# Patient Record
Sex: MALE | Race: WHITE | NOT HISPANIC OR LATINO | Employment: OTHER | URBAN - METROPOLITAN AREA
[De-identification: names, ages, dates, MRNs, and addresses within clinical notes are randomized per-mention and may not be internally consistent; named-entity substitution may affect disease eponyms.]

---

## 2017-05-12 ENCOUNTER — HOSPITAL ENCOUNTER (EMERGENCY)
Facility: HOSPITAL | Age: 37
Discharge: HOME | End: 2017-05-12
Admitting: EMERGENCY MEDICINE
Payer: COMMERCIAL

## 2017-05-12 ENCOUNTER — APPOINTMENT (EMERGENCY)
Dept: RADIOLOGY | Facility: HOSPITAL | Age: 37
End: 2017-05-12
Payer: COMMERCIAL

## 2017-05-12 VITALS
HEART RATE: 74 BPM | OXYGEN SATURATION: 95 % | RESPIRATION RATE: 18 BRPM | TEMPERATURE: 97.9 F | SYSTOLIC BLOOD PRESSURE: 127 MMHG | DIASTOLIC BLOOD PRESSURE: 64 MMHG

## 2017-05-12 DIAGNOSIS — J20.9 ACUTE BRONCHITIS: Primary | ICD-10-CM

## 2017-05-12 PROCEDURE — 71020 HB CHEST X-RAY 2VW FRONTAL&LATL: CPT

## 2017-05-12 PROCEDURE — 94640 AIRWAY INHALATION TREATMENT: CPT

## 2017-05-12 PROCEDURE — 99283 EMERGENCY DEPT VISIT LOW MDM: CPT

## 2017-05-12 RX ORDER — ALBUTEROL SULFATE 2.5 MG/3ML
5 SOLUTION RESPIRATORY (INHALATION) ONCE
Status: COMPLETED | OUTPATIENT
Start: 2017-05-12 | End: 2017-05-12

## 2017-05-12 RX ORDER — AZITHROMYCIN 250 MG/1
TABLET, FILM COATED ORAL
Qty: 6 TABLET | Refills: 0 | Status: SHIPPED | OUTPATIENT
Start: 2017-05-12 | End: 2021-05-21 | Stop reason: ALTCHOICE

## 2017-05-12 RX ORDER — BENZONATATE 100 MG/1
100 CAPSULE ORAL 3 TIMES DAILY PRN
Qty: 30 CAPSULE | Refills: 0 | Status: SHIPPED | OUTPATIENT
Start: 2017-05-12 | End: 2021-05-21 | Stop reason: ALTCHOICE

## 2017-05-12 RX ORDER — PREDNISONE 10 MG/1
TABLET ORAL
Qty: 20 TABLET | Refills: 0 | Status: SHIPPED | OUTPATIENT
Start: 2017-05-12 | End: 2021-05-21 | Stop reason: ALTCHOICE

## 2017-05-12 RX ORDER — ALBUTEROL SULFATE 90 UG/1
2 AEROSOL, METERED RESPIRATORY (INHALATION) EVERY 6 HOURS PRN
Qty: 8 G | Refills: 0 | Status: SHIPPED | OUTPATIENT
Start: 2017-05-12 | End: 2021-05-21 | Stop reason: ALTCHOICE

## 2017-05-12 RX ADMIN — ALBUTEROL SULFATE 5 MG: 2.5 SOLUTION RESPIRATORY (INHALATION) at 12:29

## 2017-05-12 RX ADMIN — IPRATROPIUM BROMIDE 0.5 MG: 0.5 SOLUTION RESPIRATORY (INHALATION) at 12:29

## 2018-01-13 NOTE — MISCELLANEOUS
Provider Comments  Provider Comments:   Dear Itzel Points,    You missed your follow up appointment with Prasanna Jessica on 12/05/2016; please contact our office to reschedule at [phone]  We understand that many situations arise that occasionally prevent patients from keeping scheduled appointments  It is the policy of [address] [place] [address] [facility] that patients notify us 24 hours in advance if unable to keep a scheduled appointment  Missed appointments jeopardize strong physician-patient relationships  The appointment you missed could have easily been made available to another patient if you had contacted us to cancel  We like to accommodate all of our patients, but when patients miss an appointment it prevents us from being able to help everyone  In the future, we request at least 24 hours' notice of cancellation so we can make your appointment available to someone else in need       Sincerely,  The Physicians and Staff at [facility]          Signatures   Electronically signed by : Althea Mcneill, ; Dec  5 2016  4:32PM EST                       (Author)

## 2018-01-16 NOTE — PROGRESS NOTES
Assessment    1  H  pylori infection (041 86) (B96 81)   2  Ulcer of the duodenum caused by bacteria (H  pylori) (532 90,041 86) (K26 9,B96 81)   3  Gastric ulcer due to Helicobacter pylori, unspecified ulcer chronicity (531 90,041 86)   (K25 9)    Discussion/Summary    1  Results of the EGD were reviewed in full with the patient and the patient and his girlfriend were advised of the cause and course of these ulcerations  The consequences of bulemia were reviewed in full with the patient  The images from the EGD were reviewed and given to the patient, he was advised to keep these and take them to his gastroenterologist   2  The patient was advised to continue taking his pepcid as directed  3  A referral was made to gastroenterology for maintenance of his chronic gastric issues and H  Pylori infection  He has fairly severe ulcer disease and I think gastroenterology should continue to follow him to ensure these have healed  The referral has been made and he will see them in the next 6 weeks  The patient was counseled regarding diagnostic results, instructions for management, prognosis, risks and benefits of treatment options  Possible side effects of new medications were reviewed with the patient/guardian today  Chief Complaint  Patient is here today for a follow up visit post EGD done on 1/22/16  The pathology returned indication H  Pylori infection  He is here to discuss the findings of the EGD and for H  Pylori treatment  His girlfriend is here to interpret  fever/chills -denies   nausea/vomiting  abd pain - Episodes of pain seem to resolve after eating small amounts of food  bowels - regular pattern, no blood or melena  patient education - H  Pylori handout given in [place]      Post-Op  HPI: Pt presents today to review results from his EGD done on 1/22/16 which showed severe ulcerations, + for H Pylori infection, but with no cancerous changes   He was prescribed a Prev pack, sucralate, and pepsid  His vomiting has improved and symptoms have decreased  He has finished his Prev pack and sucralate and it continuing to take his pepsid as directed  Pt denies N/V/F/C/CP/SOB  AN      Review of Systems    Constitutional: No fever or chills, feels well, no tiredness, no recent weight gain or weight loss  Eyes: No complaints of eye pain, no red eyes, no discharge from eyes, no itchy eyes  ENT: no complaints of earache, no hearing loss, no nosebleeds, no nasal discharge, no sore throat, no hoarseness  Cardiovascular: No complaints of slow heart rate, no fast heart rate, no chest pain, no palpitations, no leg claudication, no lower extremity  Respiratory: No complaints of shortness of breath, no wheezing, no cough, no SOB on exertion, no orthopnea or PND  Gastrointestinal: No complaints of abdominal pain, no constipation, no nausea or vomiting, no diarrhea or bloody stools  Musculoskeletal: No complaints of arthralgia, no myalgias, no joint swelling or stiffness, no limb pain or swelling  Integumentary: No complaints of skin rash or skin lesions, no itching, no skin wound, no dry skin  Neurological: No compliants of headache, no confusion, no convulsions, no numbness or tingling, no dizziness or fainting, no limb weakness, no difficulty walking  Active Problems    1  Bulimia nervosa, moderate (307 51) (F50 2)   2  Esophageal dysphagia (787 24) (R13 14)   3  Need for influenza vaccination (V04 81) (Z23)   4  Screening for depression (V79 0) (Z13 89)    Past Medical History  Active Problems And Past Medical History Reviewed: The active problems and past medical history were reviewed and updated today  Surgical History  Surgical History Reviewed: The surgical history was reviewed and updated today  Social History    · Denied: History of Drug use   · Never a smoker   · Recent decrease in alcohol use  The social history was reviewed and updated today   The social history was reviewed and is unchanged  Family History  Family History Reviewed: The family history was reviewed and updated today  Current Meds   1  No Reported Medications Recorded    Allergies    1  No Known Drug Allergies    Vitals   Recorded: [id number] 11:45AM   Temperature 98 3 F, Tympanic   Heart Rate 64, L Radial   Pulse Quality Regular, L Radial   Respiration 16   Systolic 030, LUE, Sitting   Diastolic 76, LUE, Sitting   Height 5 ft 6 in   Weight 170 lb 6 4 oz   BMI Calculated 27 5   BSA Calculated 1 87     Physical Exam    Constitutional   General appearance: No acute distress, well appearing and well nourished  Eyes   Conjunctiva and lids: No swelling, erythema, or discharge  Pupils and irises: Equal, round and reactive to light  Sclera non-icteric  Ears, Nose, Mouth, and Throat   External inspection of ears and nose: Normal     Neck   Supple, symmetric, trachea midline, no masses   Pulmonary   Respiratory effort: No increased work of breathing or signs of respiratory distress  Auscultation of lungs: Clear to auscultation, equal breath sounds bilaterally, no wheezes, no rales, no rhonci  Cardiovascular   Auscultation of heart: Normal rate and rhythm, normal S1 and S2, without murmurs  Examination of extremities for edema and/or varicosities: Normal     Carotid pulses: Normal     Abdomen   Abdomen: Non-tender, no masses  Liver and spleen: No hepatomegaly or splenomegaly  Lymphatic   Palpation of lymph nodes in neck: No lymphadenopathy  Musculoskeletal   Digits and nails: Normal without clubbing or cyanosis  Extremities: No clubbing, no cyanosis, no edema   Skin   Skin and subcutaneous tissue: Normal without rashes or lesions  Neurologic   Sensation: Motor and sensory grossly intact  Psychiatric   Orientation to person, place and time: Normal     Mood and affect: Normal        Results/Data  I personally reviewed the films/images/results in the office today   My interpretation follows  Diagnostic Review Review of the EGD and the path results, + for ulcerations and h pylori infection, but no evidence of cancerous change per pathology  Attending Note  Attending Note [address] [place] [address] [address]: Attending Note: I interviewed, took the history and examined the patient, I discussed the case with the Resident and reviewed the Resident's note, I supervised the Resident and I agree with the Resident management plan as it was presented to me  Level of Participation: I was present in clinic and examined the patient        Future Appointments    Date/Time Provider Specialty Site   03/10/2016 01:30 PM Rafael Jensen MD Gastroenterology Adult [address] ENDOSCOPY     Signatures   Electronically signed by : Gabriel Haynes MD; Feb 5 2016 12:39PM EST                       (Author)

## 2021-04-24 ENCOUNTER — HOSPITAL ENCOUNTER (INPATIENT)
Facility: HOSPITAL | Age: 41
LOS: 7 days | Discharge: HOME | DRG: 956 | End: 2021-05-01
Attending: SURGERY | Admitting: SURGERY
Payer: COMMERCIAL

## 2021-04-24 ENCOUNTER — APPOINTMENT (INPATIENT)
Dept: RADIOLOGY | Facility: HOSPITAL | Age: 41
DRG: 956 | End: 2021-04-24
Payer: COMMERCIAL

## 2021-04-24 ENCOUNTER — APPOINTMENT (EMERGENCY)
Dept: RADIOLOGY | Facility: HOSPITAL | Age: 41
DRG: 956 | End: 2021-04-24
Payer: COMMERCIAL

## 2021-04-24 DIAGNOSIS — S81.002A OPEN KNEE WOUND, LEFT, INITIAL ENCOUNTER: ICD-10-CM

## 2021-04-24 DIAGNOSIS — S52.022A CLOSED FRACTURE OF OLECRANON PROCESS OF LEFT ULNA, INITIAL ENCOUNTER: ICD-10-CM

## 2021-04-24 DIAGNOSIS — J93.9 PNEUMOTHORAX, LEFT: ICD-10-CM

## 2021-04-24 DIAGNOSIS — S72.301A CLOSED FRACTURE OF SHAFT OF RIGHT FEMUR, UNSPECIFIED FRACTURE MORPHOLOGY, INITIAL ENCOUNTER (HCC): Primary | ICD-10-CM

## 2021-04-24 PROBLEM — S72.92XA FEMUR FRACTURE, LEFT (HCC): Status: ACTIVE | Noted: 2021-04-24

## 2021-04-24 PROBLEM — S72.361A CLOSED DISPLACED SEGMENTAL FRACTURE OF SHAFT OF RIGHT FEMUR (HCC): Status: ACTIVE | Noted: 2021-04-24

## 2021-04-24 PROBLEM — S22.42XA CLOSED FRACTURE OF MULTIPLE RIBS OF LEFT SIDE: Status: ACTIVE | Noted: 2021-04-24
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
## 2021-04-24 LAB
ABO GROUP BLD: NORMAL
ABO GROUP BLD: NORMAL
ALBUMIN SERPL BCP-MCNC: 3.4 G/DL (ref 3.5–5)
ALP SERPL-CCNC: 81 U/L (ref 46–116)
ALT SERPL W P-5'-P-CCNC: 40 U/L (ref 12–78)
ANION GAP SERPL CALCULATED.3IONS-SCNC: 2 MMOL/L (ref 4–13)
APTT PPP: 23 SECONDS (ref 23–37)
AST SERPL W P-5'-P-CCNC: 24 U/L (ref 5–45)
BASE EXCESS BLDA CALC-SCNC: -3 MMOL/L (ref -2–3)
BASOPHILS # BLD AUTO: 0.06 THOUSANDS/ΜL (ref 0–0.1)
BASOPHILS NFR BLD AUTO: 1 % (ref 0–1)
BILIRUB SERPL-MCNC: 0.37 MG/DL (ref 0.2–1)
BLD GP AB SCN SERPL QL: NEGATIVE
BUN SERPL-MCNC: 19 MG/DL (ref 5–25)
CALCIUM ALBUM COR SERPL-MCNC: 8.9 MG/DL (ref 8.3–10.1)
CALCIUM SERPL-MCNC: 8.4 MG/DL (ref 8.3–10.1)
CHLORIDE SERPL-SCNC: 114 MMOL/L (ref 100–108)
CK MB SERPL-MCNC: 1.1 % (ref 0–2.5)
CK MB SERPL-MCNC: 2 NG/ML (ref 0–5)
CK SERPL-CCNC: 176 U/L (ref 39–308)
CO2 SERPL-SCNC: 25 MMOL/L (ref 21–32)
CREAT SERPL-MCNC: 1.27 MG/DL (ref 0.6–1.3)
EOSINOPHIL # BLD AUTO: 0.28 THOUSAND/ΜL (ref 0–0.61)
EOSINOPHIL NFR BLD AUTO: 2 % (ref 0–6)
ERYTHROCYTE [DISTWIDTH] IN BLOOD BY AUTOMATED COUNT: 12.6 % (ref 11.6–15.1)
ETHANOL SERPL-MCNC: <3 MG/DL (ref 0–3)
GFR SERPL CREATININE-BSD FRML MDRD: 32 ML/MIN/1.73SQ M
GLUCOSE SERPL-MCNC: 129 MG/DL (ref 65–140)
GLUCOSE SERPL-MCNC: 133 MG/DL (ref 65–140)
HCO3 BLDA-SCNC: 22.5 MMOL/L (ref 24–30)
HCT VFR BLD AUTO: 42.3 % (ref 36.5–49.3)
HCT VFR BLD AUTO: 43.8 % (ref 36.5–49.3)
HCT VFR BLD CALC: 42 % (ref 36.5–49.3)
HGB BLD-MCNC: 14.2 G/DL (ref 12–17)
HGB BLD-MCNC: 14.5 G/DL (ref 12–17)
HGB BLDA-MCNC: 14.3 G/DL (ref 12–17)
IMM GRANULOCYTES # BLD AUTO: 0.16 THOUSAND/UL (ref 0–0.2)
IMM GRANULOCYTES NFR BLD AUTO: 1 % (ref 0–2)
INR PPP: 0.92 (ref 0.84–1.19)
LACTATE SERPL-SCNC: 1.9 MMOL/L (ref 0.5–2)
LYMPHOCYTES # BLD AUTO: 5.19 THOUSANDS/ΜL (ref 0.6–4.47)
LYMPHOCYTES NFR BLD AUTO: 45 % (ref 14–44)
MCH RBC QN AUTO: 30.5 PG (ref 26.8–34.3)
MCHC RBC AUTO-ENTMCNC: 33.1 G/DL (ref 31.4–37.4)
MCV RBC AUTO: 92 FL (ref 82–98)
MONOCYTES # BLD AUTO: 0.65 THOUSAND/ΜL (ref 0.17–1.22)
MONOCYTES NFR BLD AUTO: 6 % (ref 4–12)
NEUTROPHILS # BLD AUTO: 5.1 THOUSANDS/ΜL (ref 1.85–7.62)
NEUTS SEG NFR BLD AUTO: 45 % (ref 43–75)
NRBC BLD AUTO-RTO: 0 /100 WBCS
PCO2 BLD: 24 MMOL/L (ref 21–32)
PCO2 BLD: 39.1 MM HG (ref 42–50)
PH BLD: 7.37 [PH] (ref 7.3–7.4)
PLATELET # BLD AUTO: 346 THOUSANDS/UL (ref 149–390)
PMV BLD AUTO: 8.9 FL (ref 8.9–12.7)
PO2 BLD: 65 MM HG (ref 35–45)
POTASSIUM BLD-SCNC: 4.8 MMOL/L (ref 3.5–5.3)
POTASSIUM SERPL-SCNC: 4.5 MMOL/L (ref 3.5–5.3)
PROT SERPL-MCNC: 7.1 G/DL (ref 6.4–8.2)
PROTHROMBIN TIME: 12.3 SECONDS (ref 11.6–14.5)
RBC # BLD AUTO: 4.76 MILLION/UL (ref 3.88–5.62)
RH BLD: POSITIVE
RH BLD: POSITIVE
SAO2 % BLD FROM PO2: 92 % (ref 60–85)
SODIUM BLD-SCNC: 141 MMOL/L (ref 136–145)
SODIUM SERPL-SCNC: 141 MMOL/L (ref 136–145)
SPECIMEN EXPIRATION DATE: NORMAL
SPECIMEN SOURCE: ABNORMAL
TROPONIN I SERPL-MCNC: 0.15 NG/ML
TROPONIN I SERPL-MCNC: 0.48 NG/ML
WBC # BLD AUTO: 11.44 THOUSAND/UL (ref 4.31–10.16)

## 2021-04-24 PROCEDURE — 70450 CT HEAD/BRAIN W/O DYE: CPT

## 2021-04-24 PROCEDURE — NC001 PR NO CHARGE: Performed by: SURGERY

## 2021-04-24 PROCEDURE — 84484 ASSAY OF TROPONIN QUANT: CPT | Performed by: ORTHOPAEDIC SURGERY

## 2021-04-24 PROCEDURE — 93308 TTE F-UP OR LMTD: CPT | Performed by: SURGERY

## 2021-04-24 PROCEDURE — 84295 ASSAY OF SERUM SODIUM: CPT

## 2021-04-24 PROCEDURE — 96374 THER/PROPH/DIAG INJ IV PUSH: CPT

## 2021-04-24 PROCEDURE — 0JQP0ZZ REPAIR LEFT LOWER LEG SUBCUTANEOUS TISSUE AND FASCIA, OPEN APPROACH: ICD-10-PCS | Performed by: ORTHOPAEDIC SURGERY

## 2021-04-24 PROCEDURE — G0390 TRAUMA RESPONS W/HOSP CRITI: HCPCS

## 2021-04-24 PROCEDURE — 73221 MRI JOINT UPR EXTREM W/O DYE: CPT

## 2021-04-24 PROCEDURE — 99292 CRITICAL CARE ADDL 30 MIN: CPT | Performed by: SURGERY

## 2021-04-24 PROCEDURE — 86920 COMPATIBILITY TEST SPIN: CPT

## 2021-04-24 PROCEDURE — 90715 TDAP VACCINE 7 YRS/> IM: CPT | Performed by: SURGERY

## 2021-04-24 PROCEDURE — 73552 X-RAY EXAM OF FEMUR 2/>: CPT

## 2021-04-24 PROCEDURE — 99291 CRITICAL CARE FIRST HOUR: CPT | Performed by: SURGERY

## 2021-04-24 PROCEDURE — 0S9D0ZZ DRAINAGE OF LEFT KNEE JOINT, OPEN APPROACH: ICD-10-PCS | Performed by: ORTHOPAEDIC SURGERY

## 2021-04-24 PROCEDURE — 82553 CREATINE MB FRACTION: CPT | Performed by: SURGERY

## 2021-04-24 PROCEDURE — 82550 ASSAY OF CK (CPK): CPT | Performed by: SURGERY

## 2021-04-24 PROCEDURE — G1004 CDSM NDSC: HCPCS

## 2021-04-24 PROCEDURE — NC001 PR NO CHARGE: Performed by: ORTHOPAEDIC SURGERY

## 2021-04-24 PROCEDURE — 73700 CT LOWER EXTREMITY W/O DYE: CPT

## 2021-04-24 PROCEDURE — 82947 ASSAY GLUCOSE BLOOD QUANT: CPT

## 2021-04-24 PROCEDURE — 72125 CT NECK SPINE W/O DYE: CPT

## 2021-04-24 PROCEDURE — 80053 COMPREHEN METABOLIC PANEL: CPT | Performed by: SURGERY

## 2021-04-24 PROCEDURE — NC001 PR NO CHARGE: Performed by: EMERGENCY MEDICINE

## 2021-04-24 PROCEDURE — P9016 RBC LEUKOCYTES REDUCED: HCPCS

## 2021-04-24 PROCEDURE — 76705 ECHO EXAM OF ABDOMEN: CPT | Performed by: SURGERY

## 2021-04-24 PROCEDURE — 73080 X-RAY EXAM OF ELBOW: CPT

## 2021-04-24 PROCEDURE — 85025 COMPLETE CBC W/AUTO DIFF WBC: CPT | Performed by: SURGERY

## 2021-04-24 PROCEDURE — 99291 CRITICAL CARE FIRST HOUR: CPT

## 2021-04-24 PROCEDURE — 96375 TX/PRO/DX INJ NEW DRUG ADDON: CPT

## 2021-04-24 PROCEDURE — 86900 BLOOD TYPING SEROLOGIC ABO: CPT | Performed by: SURGERY

## 2021-04-24 PROCEDURE — 96376 TX/PRO/DX INJ SAME DRUG ADON: CPT

## 2021-04-24 PROCEDURE — 36415 COLL VENOUS BLD VENIPUNCTURE: CPT | Performed by: SURGERY

## 2021-04-24 PROCEDURE — 84132 ASSAY OF SERUM POTASSIUM: CPT

## 2021-04-24 PROCEDURE — 83605 ASSAY OF LACTIC ACID: CPT | Performed by: SURGERY

## 2021-04-24 PROCEDURE — 74174 CTA ABD&PLVS W/CONTRAST: CPT

## 2021-04-24 PROCEDURE — 85014 HEMATOCRIT: CPT | Performed by: ORTHOPAEDIC SURGERY

## 2021-04-24 PROCEDURE — 86901 BLOOD TYPING SEROLOGIC RH(D): CPT | Performed by: SURGERY

## 2021-04-24 PROCEDURE — 0LQ60ZZ REPAIR LEFT LOWER ARM AND WRIST TENDON, OPEN APPROACH: ICD-10-PCS | Performed by: ORTHOPAEDIC SURGERY

## 2021-04-24 PROCEDURE — 32551 INSERTION OF CHEST TUBE: CPT | Performed by: SURGERY

## 2021-04-24 PROCEDURE — 71045 X-RAY EXAM CHEST 1 VIEW: CPT

## 2021-04-24 PROCEDURE — 85730 THROMBOPLASTIN TIME PARTIAL: CPT | Performed by: SURGERY

## 2021-04-24 PROCEDURE — 82803 BLOOD GASES ANY COMBINATION: CPT

## 2021-04-24 PROCEDURE — 82077 ASSAY SPEC XCP UR&BREATH IA: CPT | Performed by: SURGERY

## 2021-04-24 PROCEDURE — 84484 ASSAY OF TROPONIN QUANT: CPT | Performed by: SURGERY

## 2021-04-24 PROCEDURE — 90471 IMMUNIZATION ADMIN: CPT

## 2021-04-24 PROCEDURE — 93005 ELECTROCARDIOGRAM TRACING: CPT

## 2021-04-24 PROCEDURE — 86850 RBC ANTIBODY SCREEN: CPT | Performed by: SURGERY

## 2021-04-24 PROCEDURE — 85610 PROTHROMBIN TIME: CPT | Performed by: SURGERY

## 2021-04-24 PROCEDURE — 71275 CT ANGIOGRAPHY CHEST: CPT

## 2021-04-24 PROCEDURE — 30233N1 TRANSFUSION OF NONAUTOLOGOUS RED BLOOD CELLS INTO PERIPHERAL VEIN, PERCUTANEOUS APPROACH: ICD-10-PCS | Performed by: ORTHOPAEDIC SURGERY

## 2021-04-24 PROCEDURE — 0W9B30Z DRAINAGE OF LEFT PLEURAL CAVITY WITH DRAINAGE DEVICE, PERCUTANEOUS APPROACH: ICD-10-PCS | Performed by: ORTHOPAEDIC SURGERY

## 2021-04-24 PROCEDURE — 85014 HEMATOCRIT: CPT

## 2021-04-24 PROCEDURE — 85018 HEMOGLOBIN: CPT | Performed by: ORTHOPAEDIC SURGERY

## 2021-04-24 PROCEDURE — 2W6LX0Z TRACTION OF RIGHT LOWER EXTREMITY USING TRACTION APPARATUS: ICD-10-PCS | Performed by: ORTHOPAEDIC SURGERY

## 2021-04-24 RX ORDER — CEFAZOLIN SODIUM 2 G/50ML
2000 SOLUTION INTRAVENOUS EVERY 8 HOURS
Status: DISCONTINUED | OUTPATIENT
Start: 2021-04-24 | End: 2021-04-25

## 2021-04-24 RX ORDER — SODIUM CHLORIDE, SODIUM GLUCONATE, SODIUM ACETATE, POTASSIUM CHLORIDE, MAGNESIUM CHLORIDE, SODIUM PHOSPHATE, DIBASIC, AND POTASSIUM PHOSPHATE .53; .5; .37; .037; .03; .012; .00082 G/100ML; G/100ML; G/100ML; G/100ML; G/100ML; G/100ML; G/100ML
125 INJECTION, SOLUTION INTRAVENOUS CONTINUOUS
Status: DISCONTINUED | OUTPATIENT
Start: 2021-04-25 | End: 2021-04-25

## 2021-04-24 RX ORDER — ONDANSETRON 2 MG/ML
4 INJECTION INTRAMUSCULAR; INTRAVENOUS EVERY 6 HOURS PRN
Status: DISCONTINUED | OUTPATIENT
Start: 2021-04-24 | End: 2021-05-01 | Stop reason: HOSPADM

## 2021-04-24 RX ORDER — OXYCODONE HYDROCHLORIDE 10 MG/1
10 TABLET ORAL EVERY 4 HOURS PRN
Status: DISCONTINUED | OUTPATIENT
Start: 2021-04-24 | End: 2021-05-01 | Stop reason: HOSPADM

## 2021-04-24 RX ORDER — SODIUM CHLORIDE, SODIUM GLUCONATE, SODIUM ACETATE, POTASSIUM CHLORIDE, MAGNESIUM CHLORIDE, SODIUM PHOSPHATE, DIBASIC, AND POTASSIUM PHOSPHATE .53; .5; .37; .037; .03; .012; .00082 G/100ML; G/100ML; G/100ML; G/100ML; G/100ML; G/100ML; G/100ML
125 INJECTION, SOLUTION INTRAVENOUS CONTINUOUS
Status: DISCONTINUED | OUTPATIENT
Start: 2021-04-24 | End: 2021-04-24

## 2021-04-24 RX ORDER — HYDROMORPHONE HCL/PF 1 MG/ML
1 SYRINGE (ML) INJECTION ONCE
Status: COMPLETED | OUTPATIENT
Start: 2021-04-24 | End: 2021-04-24

## 2021-04-24 RX ORDER — METHOCARBAMOL 500 MG/1
500 TABLET, FILM COATED ORAL EVERY 6 HOURS SCHEDULED
Status: DISCONTINUED | OUTPATIENT
Start: 2021-04-24 | End: 2021-05-01 | Stop reason: HOSPADM

## 2021-04-24 RX ORDER — KETAMINE HYDROCHLORIDE 50 MG/ML
70 INJECTION, SOLUTION, CONCENTRATE INTRAMUSCULAR; INTRAVENOUS ONCE
Status: COMPLETED | OUTPATIENT
Start: 2021-04-24 | End: 2021-04-24

## 2021-04-24 RX ORDER — LIDOCAINE HYDROCHLORIDE 10 MG/ML
30 INJECTION, SOLUTION EPIDURAL; INFILTRATION; INTRACAUDAL; PERINEURAL ONCE
Status: DISCONTINUED | OUTPATIENT
Start: 2021-04-24 | End: 2021-04-24

## 2021-04-24 RX ORDER — FENTANYL CITRATE 50 UG/ML
INJECTION, SOLUTION INTRAMUSCULAR; INTRAVENOUS CODE/TRAUMA/SEDATION MEDICATION
Status: COMPLETED | OUTPATIENT
Start: 2021-04-24 | End: 2021-04-24

## 2021-04-24 RX ORDER — OXYCODONE HYDROCHLORIDE 5 MG/1
5 TABLET ORAL EVERY 4 HOURS PRN
Status: DISCONTINUED | OUTPATIENT
Start: 2021-04-24 | End: 2021-05-01 | Stop reason: HOSPADM

## 2021-04-24 RX ORDER — SENNOSIDES 8.6 MG
2 TABLET ORAL DAILY
Status: DISCONTINUED | OUTPATIENT
Start: 2021-04-24 | End: 2021-05-01 | Stop reason: HOSPADM

## 2021-04-24 RX ORDER — LIDOCAINE HYDROCHLORIDE 10 MG/ML
30 INJECTION, SOLUTION EPIDURAL; INFILTRATION; INTRACAUDAL; PERINEURAL ONCE
Status: COMPLETED | OUTPATIENT
Start: 2021-04-24 | End: 2021-04-24

## 2021-04-24 RX ORDER — DIAZEPAM 5 MG/ML
5 INJECTION, SOLUTION INTRAMUSCULAR; INTRAVENOUS ONCE
Status: COMPLETED | OUTPATIENT
Start: 2021-04-24 | End: 2021-04-24

## 2021-04-24 RX ORDER — ONDANSETRON 2 MG/ML
1 INJECTION INTRAMUSCULAR; INTRAVENOUS ONCE
Status: COMPLETED | OUTPATIENT
Start: 2021-04-24 | End: 2021-04-24

## 2021-04-24 RX ORDER — KETAMINE HYDROCHLORIDE 50 MG/ML
100 INJECTION, SOLUTION, CONCENTRATE INTRAMUSCULAR; INTRAVENOUS ONCE
Status: COMPLETED | OUTPATIENT
Start: 2021-04-24 | End: 2021-04-24

## 2021-04-24 RX ORDER — FENTANYL CITRATE 50 UG/ML
2 INJECTION, SOLUTION INTRAMUSCULAR; INTRAVENOUS ONCE
Status: COMPLETED | OUTPATIENT
Start: 2021-04-24 | End: 2021-04-24

## 2021-04-24 RX ORDER — ACETAMINOPHEN 325 MG/1
975 TABLET ORAL EVERY 8 HOURS SCHEDULED
Status: DISCONTINUED | OUTPATIENT
Start: 2021-04-24 | End: 2021-05-01 | Stop reason: HOSPADM

## 2021-04-24 RX ORDER — CEFAZOLIN SODIUM 1 G/3ML
1 INJECTION, POWDER, FOR SOLUTION INTRAMUSCULAR; INTRAVENOUS ONCE
Status: COMPLETED | OUTPATIENT
Start: 2021-04-24 | End: 2021-04-24

## 2021-04-24 RX ORDER — HYDROMORPHONE HCL/PF 1 MG/ML
0.5 SYRINGE (ML) INJECTION
Status: DISCONTINUED | OUTPATIENT
Start: 2021-04-24 | End: 2021-04-28

## 2021-04-24 RX ORDER — DOCUSATE SODIUM 100 MG/1
100 CAPSULE, LIQUID FILLED ORAL 2 TIMES DAILY
Status: DISCONTINUED | OUTPATIENT
Start: 2021-04-24 | End: 2021-05-01 | Stop reason: HOSPADM

## 2021-04-24 RX ADMIN — LIDOCAINE HYDROCHLORIDE 30 ML: 10 INJECTION, SOLUTION EPIDURAL; INFILTRATION; INTRACAUDAL; PERINEURAL at 12:51

## 2021-04-24 RX ADMIN — IOHEXOL 100 ML: 350 INJECTION, SOLUTION INTRAVENOUS at 11:57

## 2021-04-24 RX ADMIN — DIAZEPAM 5 MG: 10 INJECTION, SOLUTION INTRAMUSCULAR; INTRAVENOUS at 16:53

## 2021-04-24 RX ADMIN — FENTANYL CITRATE 50 MCG: 50 INJECTION INTRAMUSCULAR; INTRAVENOUS at 11:06

## 2021-04-24 RX ADMIN — DOCUSATE SODIUM 100 MG: 100 CAPSULE, LIQUID FILLED ORAL at 21:27

## 2021-04-24 RX ADMIN — ACETAMINOPHEN 975 MG: 325 TABLET ORAL at 21:26

## 2021-04-24 RX ADMIN — SENNOSIDES 17.2 MG: 8.6 TABLET, FILM COATED ORAL at 21:26

## 2021-04-24 RX ADMIN — KETAMINE HYDROCHLORIDE 70 MG: 50 INJECTION, SOLUTION INTRAMUSCULAR; INTRAVENOUS at 11:08

## 2021-04-24 RX ADMIN — FENTANYL CITRATE 50 MCG: 50 INJECTION INTRAMUSCULAR; INTRAVENOUS at 11:29

## 2021-04-24 RX ADMIN — ENOXAPARIN SODIUM 30 MG: 30 INJECTION SUBCUTANEOUS at 21:26

## 2021-04-24 RX ADMIN — CEFAZOLIN SODIUM 2000 MG: 2 SOLUTION INTRAVENOUS at 19:53

## 2021-04-24 RX ADMIN — KETAMINE HYDROCHLORIDE 88 MG: 50 INJECTION INTRAMUSCULAR; INTRAVENOUS at 12:36

## 2021-04-24 RX ADMIN — ACETAMINOPHEN 975 MG: 325 TABLET ORAL at 16:52

## 2021-04-24 RX ADMIN — ENOXAPARIN SODIUM 30 MG: 30 INJECTION SUBCUTANEOUS at 14:34

## 2021-04-24 RX ADMIN — OXYCODONE HYDROCHLORIDE 10 MG: 10 TABLET ORAL at 16:53

## 2021-04-24 RX ADMIN — FENTANYL CITRATE 100 MCG: 50 INJECTION INTRAMUSCULAR; INTRAVENOUS at 12:51

## 2021-04-24 RX ADMIN — METHOCARBAMOL 500 MG: 500 TABLET, FILM COATED ORAL at 19:53

## 2021-04-24 RX ADMIN — NICOTINE 7 MG/24 HR DAILY TRANSDERMAL PATCH 1 PATCH: at 14:34

## 2021-04-24 RX ADMIN — LIDOCAINE HYDROCHLORIDE 30 ML: 10; .005 INJECTION, SOLUTION EPIDURAL; INFILTRATION; INTRACAUDAL; PERINEURAL at 12:35

## 2021-04-24 RX ADMIN — METHOCARBAMOL 500 MG: 500 TABLET, FILM COATED ORAL at 14:34

## 2021-04-24 RX ADMIN — HYDROMORPHONE HYDROCHLORIDE 1 MG: 1 INJECTION, SOLUTION INTRAMUSCULAR; INTRAVENOUS; SUBCUTANEOUS at 11:48

## 2021-04-24 RX ADMIN — HYDROMORPHONE HYDROCHLORIDE 0.5 MG: 1 INJECTION, SOLUTION INTRAMUSCULAR; INTRAVENOUS; SUBCUTANEOUS at 14:34

## 2021-04-24 RX ADMIN — TETANUS TOXOID, REDUCED DIPHTHERIA TOXOID AND ACELLULAR PERTUSSIS VACCINE, ADSORBED 0.5 ML: 5; 2.5; 8; 8; 2.5 SUSPENSION INTRAMUSCULAR at 11:16

## 2021-04-24 RX ADMIN — HYDROMORPHONE HYDROCHLORIDE 0.5 MG: 1 INJECTION, SOLUTION INTRAMUSCULAR; INTRAVENOUS; SUBCUTANEOUS at 19:12
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
## 2021-04-24 NOTE — TRAUMA DOCUMENTATION
ED attending, ED resident and trauma fellow still at the bedside at this time   Ortho attempting to clean out the right leg and bucks traction on the RLE

## 2021-04-24 NOTE — TRAUMA DOCUMENTATION
Trauma and ortho at the bedside attempting placement of the chest tube  Time out completed by ED attending and the trauma fellow   Pt able to confirm by the pt the  and the name
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
## 2021-04-24 NOTE — PROCEDURES
Chest Tube Insertion    Date/Time: 4/24/2021 1:21 PM  Performed by: Mirela Esposito MD  Authorized by: Mirela Esposito MD     Patient location:  ED  Other Assisting Provider: Yes (comment)    Consent:     Consent obtained:  Verbal and written    Consent given by:  Patient    Risks discussed:  Bleeding, incomplete drainage, pain, infection and damage to surrounding structures    Alternatives discussed:  Alternative treatment  Universal protocol:     Procedure explained and questions answered to patient or proxy's satisfaction: yes      Relevant documents present and verified: yes      Radiology Images displayed and confirmed  If images not available, report reviewed: yes      Required blood products, implants, devices, and special equipment available: yes      Site/side marked: yes      Immediately prior to procedure a time out was called: yes      Patient identity confirmed:  Verbally with patient and arm band  Pre-procedure details:     Skin preparation:  Betadine  Indications:     Indications: pneumothroax    Sedation:     Sedation type: Anxiolysis  Anesthesia (see MAR for exact dosages): Anesthesia method:  Local infiltration    Local anesthetic:  Lidocaine 1% WITH epi  Procedure details:     Placement location:  Lateral    Laterality:  Left    Scalpel size:  11    Thal-Quick Chest Tube Kit:  20 Fr    Tube size (Fr):  24    Dissection instrument:  Dana clamp and finger    Tube connected to:  Suction    Drainage characteristics:  Serosanguinous    Suture material:  2-0 silk    Dressing:  4x4 sterile gauze and Xeroform gauze  Post-procedure details:     Patient tolerance of procedure:   Tolerated well, no immediate complications
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
## 2021-04-24 NOTE — PROGRESS NOTES
Pt arrived to inpatient unit after MRI, accompanied by ED nurse  Per ED RN, ross's traction removed for the MRI, then reapplied by ED RN once scan was completed  Upon first assessment of patient, he was extremely diaphoretic, pale, and his right leg was externally rotated "more so than before" per ED RN, with ross's traction appearing to be improperly assembled  Vital signs stabIe and pedal pulse obtained via doppler  [place] and Mina Lindseyr immediately contacted via tigertext to evaluate the patient  Mina Cason at bedside and was  assisted by myself and another RN to reapply traction correctly  Pain meds administered as ordered  Patient will continue to be monitored by nightshift RN

## 2021-04-24 NOTE — PROCEDURES
POC FAST US    Date/Time: 4/24/2021 1:20 PM  Performed by: Kyler Garcia MD  Authorized by: Kyler Garcia MD     Patient location:  Trauma  Procedure details:     Exam Type:  Diagnostic    Indications: blunt chest trauma      Assess for:  Hemothorax, intra-abdominal fluid and pericardial effusion    Technique: FAST      Views obtained:  Heart - Pericardial sac, RUQ - Macias's Pouch, LUQ - Splenorenal space and Suprapubic - Pouch of Gerardo    Image quality: limited diagnostic      Image availability:  Images available in PACS  FAST Findings:     RUQ (Hepatorenal) free fluid: absent      LUQ (Splenorenal) free fluid: absent      Suprapubic free fluid: absent      Cardiac wall motion: identified      Pericardial effusion: absent    Interpretation:     Impressions: negative

## 2021-04-25 ENCOUNTER — APPOINTMENT (INPATIENT)
Dept: RADIOLOGY | Facility: HOSPITAL | Age: 41
DRG: 956 | End: 2021-04-25
Payer: COMMERCIAL

## 2021-04-25 ENCOUNTER — APPOINTMENT (INPATIENT)
Dept: NON INVASIVE DIAGNOSTICS | Facility: HOSPITAL | Age: 41
DRG: 956 | End: 2021-04-25
Payer: COMMERCIAL

## 2021-04-25 ENCOUNTER — ANESTHESIA (INPATIENT)
Dept: PERIOP | Facility: HOSPITAL | Age: 41
DRG: 956 | End: 2021-04-25
Payer: COMMERCIAL

## 2021-04-25 ENCOUNTER — ANESTHESIA EVENT (INPATIENT)
Dept: PERIOP | Facility: HOSPITAL | Age: 41
DRG: 956 | End: 2021-04-25
Payer: COMMERCIAL

## 2021-04-25 PROBLEM — S52.022A CLOSED FRACTURE OF LEFT OLECRANON PROCESS: Status: ACTIVE | Noted: 2021-04-25

## 2021-04-25 PROBLEM — R79.89 ELEVATED TROPONIN: Status: ACTIVE | Noted: 2021-04-25

## 2021-04-25 PROBLEM — D62 ACUTE BLOOD LOSS ANEMIA: Status: ACTIVE | Noted: 2021-04-25

## 2021-04-25 PROBLEM — N17.9 AKI (ACUTE KIDNEY INJURY) (HCC): Status: ACTIVE | Noted: 2021-04-25

## 2021-04-25 PROBLEM — R77.8 ELEVATED TROPONIN: Status: ACTIVE | Noted: 2021-04-25

## 2021-04-25 LAB
ABO GROUP BLD BPU: NORMAL
ANION GAP SERPL CALCULATED.3IONS-SCNC: 5 MMOL/L (ref 4–13)
ANION GAP SERPL CALCULATED.3IONS-SCNC: 7 MMOL/L (ref 4–13)
BASE EXCESS BLDA CALC-SCNC: -2 MMOL/L (ref -2–3)
BASOPHILS # BLD AUTO: 0.01 THOUSANDS/ΜL (ref 0–0.1)
BASOPHILS # BLD AUTO: 0.02 THOUSANDS/ΜL (ref 0–0.1)
BASOPHILS NFR BLD AUTO: 0 % (ref 0–1)
BASOPHILS NFR BLD AUTO: 0 % (ref 0–1)
BPU ID: NORMAL
BUN SERPL-MCNC: 23 MG/DL (ref 5–25)
BUN SERPL-MCNC: 25 MG/DL (ref 5–25)
CA-I BLD-SCNC: 1.08 MMOL/L (ref 1.12–1.32)
CALCIUM SERPL-MCNC: 7.9 MG/DL (ref 8.3–10.1)
CALCIUM SERPL-MCNC: 7.9 MG/DL (ref 8.3–10.1)
CHLORIDE SERPL-SCNC: 105 MMOL/L (ref 100–108)
CHLORIDE SERPL-SCNC: 107 MMOL/L (ref 100–108)
CO2 SERPL-SCNC: 22 MMOL/L (ref 21–32)
CO2 SERPL-SCNC: 24 MMOL/L (ref 21–32)
CREAT SERPL-MCNC: 1.17 MG/DL (ref 0.6–1.3)
CREAT SERPL-MCNC: 1.43 MG/DL (ref 0.6–1.3)
CROSSMATCH: NORMAL
EOSINOPHIL # BLD AUTO: 0 THOUSAND/ΜL (ref 0–0.61)
EOSINOPHIL # BLD AUTO: 0.02 THOUSAND/ΜL (ref 0–0.61)
EOSINOPHIL NFR BLD AUTO: 0 % (ref 0–6)
EOSINOPHIL NFR BLD AUTO: 0 % (ref 0–6)
ERYTHROCYTE [DISTWIDTH] IN BLOOD BY AUTOMATED COUNT: 12.8 % (ref 11.6–15.1)
ERYTHROCYTE [DISTWIDTH] IN BLOOD BY AUTOMATED COUNT: 12.8 % (ref 11.6–15.1)
GFR SERPL CREATININE-BSD FRML MDRD: 60 ML/MIN/1.73SQ M
GFR SERPL CREATININE-BSD FRML MDRD: 77 ML/MIN/1.73SQ M
GLUCOSE SERPL-MCNC: 106 MG/DL (ref 65–140)
GLUCOSE SERPL-MCNC: 121 MG/DL (ref 65–140)
GLUCOSE SERPL-MCNC: 124 MG/DL (ref 65–140)
HCO3 BLDA-SCNC: 22.9 MMOL/L (ref 22–28)
HCT VFR BLD AUTO: 25.1 % (ref 36.5–49.3)
HCT VFR BLD AUTO: 36.3 % (ref 36.5–49.3)
HCT VFR BLD CALC: 25 % (ref 36.5–49.3)
HGB BLD-MCNC: 12.2 G/DL (ref 12–17)
HGB BLD-MCNC: 8.5 G/DL (ref 12–17)
HGB BLDA-MCNC: 8.5 G/DL (ref 12–17)
IMM GRANULOCYTES # BLD AUTO: 0.04 THOUSAND/UL (ref 0–0.2)
IMM GRANULOCYTES # BLD AUTO: 0.05 THOUSAND/UL (ref 0–0.2)
IMM GRANULOCYTES NFR BLD AUTO: 0 % (ref 0–2)
IMM GRANULOCYTES NFR BLD AUTO: 1 % (ref 0–2)
LACTATE SERPL-SCNC: 1.7 MMOL/L (ref 0.5–2)
LYMPHOCYTES # BLD AUTO: 0.6 THOUSANDS/ΜL (ref 0.6–4.47)
LYMPHOCYTES # BLD AUTO: 3.1 THOUSANDS/ΜL (ref 0.6–4.47)
LYMPHOCYTES NFR BLD AUTO: 27 % (ref 14–44)
LYMPHOCYTES NFR BLD AUTO: 7 % (ref 14–44)
MCH RBC QN AUTO: 30.8 PG (ref 26.8–34.3)
MCH RBC QN AUTO: 30.9 PG (ref 26.8–34.3)
MCHC RBC AUTO-ENTMCNC: 33.6 G/DL (ref 31.4–37.4)
MCHC RBC AUTO-ENTMCNC: 33.9 G/DL (ref 31.4–37.4)
MCV RBC AUTO: 91 FL (ref 82–98)
MCV RBC AUTO: 92 FL (ref 82–98)
MONOCYTES # BLD AUTO: 0.42 THOUSAND/ΜL (ref 0.17–1.22)
MONOCYTES # BLD AUTO: 1.36 THOUSAND/ΜL (ref 0.17–1.22)
MONOCYTES NFR BLD AUTO: 12 % (ref 4–12)
MONOCYTES NFR BLD AUTO: 5 % (ref 4–12)
NEUTROPHILS # BLD AUTO: 7.11 THOUSANDS/ΜL (ref 1.85–7.62)
NEUTROPHILS # BLD AUTO: 7.29 THOUSANDS/ΜL (ref 1.85–7.62)
NEUTS SEG NFR BLD AUTO: 61 % (ref 43–75)
NEUTS SEG NFR BLD AUTO: 87 % (ref 43–75)
NRBC BLD AUTO-RTO: 0 /100 WBCS
NRBC BLD AUTO-RTO: 0 /100 WBCS
PCO2 BLD: 24 MMOL/L (ref 21–32)
PCO2 BLD: 39.1 MM HG (ref 36–44)
PH BLD: 7.38 [PH] (ref 7.35–7.45)
PLATELET # BLD AUTO: 174 THOUSANDS/UL (ref 149–390)
PLATELET # BLD AUTO: 249 THOUSANDS/UL (ref 149–390)
PMV BLD AUTO: 9.1 FL (ref 8.9–12.7)
PMV BLD AUTO: 9.3 FL (ref 8.9–12.7)
PO2 BLD: 253 MM HG (ref 75–129)
POTASSIUM BLD-SCNC: 4.5 MMOL/L (ref 3.5–5.3)
POTASSIUM SERPL-SCNC: 4.6 MMOL/L (ref 3.5–5.3)
POTASSIUM SERPL-SCNC: 4.9 MMOL/L (ref 3.5–5.3)
RBC # BLD AUTO: 2.75 MILLION/UL (ref 3.88–5.62)
RBC # BLD AUTO: 3.96 MILLION/UL (ref 3.88–5.62)
SAO2 % BLD FROM PO2: 100 % (ref 60–85)
SODIUM BLD-SCNC: 135 MMOL/L (ref 136–145)
SODIUM SERPL-SCNC: 134 MMOL/L (ref 136–145)
SODIUM SERPL-SCNC: 136 MMOL/L (ref 136–145)
SPECIMEN SOURCE: ABNORMAL
TROPONIN I SERPL-MCNC: 0.14 NG/ML
TROPONIN I SERPL-MCNC: 0.18 NG/ML
TROPONIN I SERPL-MCNC: 0.24 NG/ML
UNIT DISPENSE STATUS: NORMAL
UNIT PRODUCT CODE: NORMAL
UNIT RH: NORMAL
WBC # BLD AUTO: 11.66 THOUSAND/UL (ref 4.31–10.16)
WBC # BLD AUTO: 8.36 THOUSAND/UL (ref 4.31–10.16)

## 2021-04-25 PROCEDURE — C1713 ANCHOR/SCREW BN/BN,TIS/BN: HCPCS | Performed by: ORTHOPAEDIC SURGERY

## 2021-04-25 PROCEDURE — 27310 EXPLORATION OF KNEE JOINT: CPT | Performed by: ORTHOPAEDIC SURGERY

## 2021-04-25 PROCEDURE — NC001 PR NO CHARGE: Performed by: ORTHOPAEDIC SURGERY

## 2021-04-25 PROCEDURE — 93306 TTE W/DOPPLER COMPLETE: CPT

## 2021-04-25 PROCEDURE — 84132 ASSAY OF SERUM POTASSIUM: CPT

## 2021-04-25 PROCEDURE — 0PSL04Z REPOSITION LEFT ULNA WITH INTERNAL FIXATION DEVICE, OPEN APPROACH: ICD-10-PCS | Performed by: ORTHOPAEDIC SURGERY

## 2021-04-25 PROCEDURE — 73070 X-RAY EXAM OF ELBOW: CPT | Performed by: ORTHOPAEDIC SURGERY

## 2021-04-25 PROCEDURE — 84484 ASSAY OF TROPONIN QUANT: CPT | Performed by: SURGERY

## 2021-04-25 PROCEDURE — 85025 COMPLETE CBC W/AUTO DIFF WBC: CPT | Performed by: EMERGENCY MEDICINE

## 2021-04-25 PROCEDURE — 73552 X-RAY EXAM OF FEMUR 2/>: CPT

## 2021-04-25 PROCEDURE — 84295 ASSAY OF SERUM SODIUM: CPT

## 2021-04-25 PROCEDURE — 80048 BASIC METABOLIC PNL TOTAL CA: CPT | Performed by: SURGERY

## 2021-04-25 PROCEDURE — 85014 HEMATOCRIT: CPT

## 2021-04-25 PROCEDURE — 24685 OPTX ULNAR FX PROX END W/FIX: CPT | Performed by: ORTHOPAEDIC SURGERY

## 2021-04-25 PROCEDURE — 93306 TTE W/DOPPLER COMPLETE: CPT | Performed by: INTERNAL MEDICINE

## 2021-04-25 PROCEDURE — 73070 X-RAY EXAM OF ELBOW: CPT

## 2021-04-25 PROCEDURE — 99221 1ST HOSP IP/OBS SF/LOW 40: CPT | Performed by: ORTHOPAEDIC SURGERY

## 2021-04-25 PROCEDURE — 82803 BLOOD GASES ANY COMBINATION: CPT

## 2021-04-25 PROCEDURE — 0QSB06Z REPOSITION RIGHT LOWER FEMUR WITH INTRAMEDULLARY INTERNAL FIXATION DEVICE, OPEN APPROACH: ICD-10-PCS | Performed by: ORTHOPAEDIC SURGERY

## 2021-04-25 PROCEDURE — 82947 ASSAY GLUCOSE BLOOD QUANT: CPT

## 2021-04-25 PROCEDURE — 85025 COMPLETE CBC W/AUTO DIFF WBC: CPT | Performed by: SURGERY

## 2021-04-25 PROCEDURE — 71045 X-RAY EXAM CHEST 1 VIEW: CPT

## 2021-04-25 PROCEDURE — NC001 PR NO CHARGE: Performed by: SURGERY

## 2021-04-25 PROCEDURE — C1769 GUIDE WIRE: HCPCS | Performed by: ORTHOPAEDIC SURGERY

## 2021-04-25 PROCEDURE — 82330 ASSAY OF CALCIUM: CPT

## 2021-04-25 PROCEDURE — 27506 TREATMENT OF THIGH FRACTURE: CPT | Performed by: ORTHOPAEDIC SURGERY

## 2021-04-25 PROCEDURE — 73552 X-RAY EXAM OF FEMUR 2/>: CPT | Performed by: ORTHOPAEDIC SURGERY

## 2021-04-25 PROCEDURE — 94760 N-INVAS EAR/PLS OXIMETRY 1: CPT

## 2021-04-25 PROCEDURE — 80048 BASIC METABOLIC PNL TOTAL CA: CPT | Performed by: EMERGENCY MEDICINE

## 2021-04-25 PROCEDURE — 83605 ASSAY OF LACTIC ACID: CPT | Performed by: EMERGENCY MEDICINE

## 2021-04-25 DEVICE — 12MM TI CANN RETRO/ANTEGRADE FEMORAL NAIL-EX/380MM-STERILE
Type: IMPLANTABLE DEVICE | Site: FEMUR | Status: FUNCTIONAL
Brand: EXPERT NAIL

## 2021-04-25 DEVICE — 5.0MM TI LOCKING SCREW W/T25 STARDRIVE 85MM F/IM NAIL-STER: Type: IMPLANTABLE DEVICE | Site: FEMUR | Status: FUNCTIONAL

## 2021-04-25 DEVICE — 5.0MM TI LOCKING SCREW W/T25 STARDRIVE 62MM F/IM NAIL-STER: Type: IMPLANTABLE DEVICE | Site: FEMUR | Status: FUNCTIONAL

## 2021-04-25 DEVICE — 5.0MM TI LOCKING SCREW W/T25 STARDRIVE 32MM FOR IM NAILS: Type: IMPLANTABLE DEVICE | Site: FEMUR | Status: FUNCTIONAL

## 2021-04-25 DEVICE — 5.0MM TI LOCKING SCREW W/T25 STARDRIVE 34MM FOR IM NAILS: Type: IMPLANTABLE DEVICE | Site: FEMUR | Status: FUNCTIONAL

## 2021-04-25 RX ORDER — HYDROMORPHONE HCL/PF 1 MG/ML
SYRINGE (ML) INJECTION AS NEEDED
Status: DISCONTINUED | OUTPATIENT
Start: 2021-04-25 | End: 2021-04-25

## 2021-04-25 RX ORDER — ALBUTEROL SULFATE 2.5 MG/3ML
2.5 SOLUTION RESPIRATORY (INHALATION) ONCE AS NEEDED
Status: DISCONTINUED | OUTPATIENT
Start: 2021-04-25 | End: 2021-04-25 | Stop reason: HOSPADM

## 2021-04-25 RX ORDER — ONDANSETRON 2 MG/ML
4 INJECTION INTRAMUSCULAR; INTRAVENOUS ONCE AS NEEDED
Status: DISCONTINUED | OUTPATIENT
Start: 2021-04-25 | End: 2021-04-25 | Stop reason: HOSPADM

## 2021-04-25 RX ORDER — SODIUM CHLORIDE, SODIUM LACTATE, POTASSIUM CHLORIDE, CALCIUM CHLORIDE 600; 310; 30; 20 MG/100ML; MG/100ML; MG/100ML; MG/100ML
INJECTION, SOLUTION INTRAVENOUS CONTINUOUS PRN
Status: DISCONTINUED | OUTPATIENT
Start: 2021-04-25 | End: 2021-04-25

## 2021-04-25 RX ORDER — MAGNESIUM HYDROXIDE 1200 MG/15ML
LIQUID ORAL AS NEEDED
Status: DISCONTINUED | OUTPATIENT
Start: 2021-04-25 | End: 2021-04-25 | Stop reason: HOSPADM

## 2021-04-25 RX ORDER — LABETALOL 20 MG/4 ML (5 MG/ML) INTRAVENOUS SYRINGE
10
Status: DISCONTINUED | OUTPATIENT
Start: 2021-04-25 | End: 2021-04-25 | Stop reason: HOSPADM

## 2021-04-25 RX ORDER — HYDROMORPHONE HCL/PF 1 MG/ML
0.5 SYRINGE (ML) INJECTION
Status: DISCONTINUED | OUTPATIENT
Start: 2021-04-25 | End: 2021-04-25 | Stop reason: HOSPADM

## 2021-04-25 RX ORDER — CEFAZOLIN SODIUM 2 G/50ML
2000 SOLUTION INTRAVENOUS EVERY 8 HOURS
Status: COMPLETED | OUTPATIENT
Start: 2021-04-25 | End: 2021-04-26

## 2021-04-25 RX ORDER — ROCURONIUM BROMIDE 10 MG/ML
INJECTION, SOLUTION INTRAVENOUS AS NEEDED
Status: DISCONTINUED | OUTPATIENT
Start: 2021-04-25 | End: 2021-04-25

## 2021-04-25 RX ORDER — METOCLOPRAMIDE HYDROCHLORIDE 5 MG/ML
10 INJECTION INTRAMUSCULAR; INTRAVENOUS ONCE AS NEEDED
Status: DISCONTINUED | OUTPATIENT
Start: 2021-04-25 | End: 2021-04-25 | Stop reason: HOSPADM

## 2021-04-25 RX ORDER — GLYCOPYRROLATE 0.2 MG/ML
INJECTION INTRAMUSCULAR; INTRAVENOUS AS NEEDED
Status: DISCONTINUED | OUTPATIENT
Start: 2021-04-25 | End: 2021-04-25

## 2021-04-25 RX ORDER — SODIUM CHLORIDE 9 MG/ML
INJECTION, SOLUTION INTRAVENOUS CONTINUOUS PRN
Status: DISCONTINUED | OUTPATIENT
Start: 2021-04-25 | End: 2021-04-25

## 2021-04-25 RX ORDER — HYDROMORPHONE HCL IN WATER/PF 6 MG/30 ML
0.2 PATIENT CONTROLLED ANALGESIA SYRINGE INTRAVENOUS
Status: DISCONTINUED | OUTPATIENT
Start: 2021-04-25 | End: 2021-04-25 | Stop reason: HOSPADM

## 2021-04-25 RX ORDER — PROPOFOL 10 MG/ML
INJECTION, EMULSION INTRAVENOUS AS NEEDED
Status: DISCONTINUED | OUTPATIENT
Start: 2021-04-25 | End: 2021-04-25

## 2021-04-25 RX ORDER — ONDANSETRON 2 MG/ML
INJECTION INTRAMUSCULAR; INTRAVENOUS AS NEEDED
Status: DISCONTINUED | OUTPATIENT
Start: 2021-04-25 | End: 2021-04-25

## 2021-04-25 RX ORDER — FENTANYL CITRATE 50 UG/ML
INJECTION, SOLUTION INTRAMUSCULAR; INTRAVENOUS AS NEEDED
Status: DISCONTINUED | OUTPATIENT
Start: 2021-04-25 | End: 2021-04-25

## 2021-04-25 RX ORDER — GINSENG 100 MG
1 CAPSULE ORAL 2 TIMES DAILY
Status: DISCONTINUED | OUTPATIENT
Start: 2021-04-25 | End: 2021-05-01 | Stop reason: HOSPADM

## 2021-04-25 RX ORDER — MIDAZOLAM HYDROCHLORIDE 2 MG/2ML
INJECTION, SOLUTION INTRAMUSCULAR; INTRAVENOUS AS NEEDED
Status: DISCONTINUED | OUTPATIENT
Start: 2021-04-25 | End: 2021-04-25

## 2021-04-25 RX ORDER — KETAMINE HYDROCHLORIDE 50 MG/ML
INJECTION, SOLUTION, CONCENTRATE INTRAMUSCULAR; INTRAVENOUS AS NEEDED
Status: DISCONTINUED | OUTPATIENT
Start: 2021-04-25 | End: 2021-04-25

## 2021-04-25 RX ORDER — NEOSTIGMINE METHYLSULFATE 1 MG/ML
INJECTION INTRAVENOUS AS NEEDED
Status: DISCONTINUED | OUTPATIENT
Start: 2021-04-25 | End: 2021-04-25

## 2021-04-25 RX ORDER — SODIUM CHLORIDE, SODIUM LACTATE, POTASSIUM CHLORIDE, CALCIUM CHLORIDE 600; 310; 30; 20 MG/100ML; MG/100ML; MG/100ML; MG/100ML
125 INJECTION, SOLUTION INTRAVENOUS CONTINUOUS
Status: DISCONTINUED | OUTPATIENT
Start: 2021-04-25 | End: 2021-04-26

## 2021-04-25 RX ORDER — PROMETHAZINE HYDROCHLORIDE 25 MG/ML
12.5 INJECTION, SOLUTION INTRAMUSCULAR; INTRAVENOUS ONCE AS NEEDED
Status: DISCONTINUED | OUTPATIENT
Start: 2021-04-25 | End: 2021-04-25 | Stop reason: HOSPADM

## 2021-04-25 RX ORDER — HYDRALAZINE HYDROCHLORIDE 20 MG/ML
5 INJECTION INTRAMUSCULAR; INTRAVENOUS
Status: DISCONTINUED | OUTPATIENT
Start: 2021-04-25 | End: 2021-04-25 | Stop reason: HOSPADM

## 2021-04-25 RX ORDER — CALCIUM CHLORIDE 100 MG/ML
INJECTION INTRAVENOUS; INTRAVENTRICULAR AS NEEDED
Status: DISCONTINUED | OUTPATIENT
Start: 2021-04-25 | End: 2021-04-25

## 2021-04-25 RX ORDER — ALBUMIN, HUMAN INJ 5% 5 %
SOLUTION INTRAVENOUS CONTINUOUS PRN
Status: DISCONTINUED | OUTPATIENT
Start: 2021-04-25 | End: 2021-04-25

## 2021-04-25 RX ORDER — FENTANYL CITRATE/PF 50 MCG/ML
25 SYRINGE (ML) INJECTION
Status: DISCONTINUED | OUTPATIENT
Start: 2021-04-25 | End: 2021-04-25 | Stop reason: HOSPADM

## 2021-04-25 RX ORDER — DEXAMETHASONE SODIUM PHOSPHATE 10 MG/ML
INJECTION, SOLUTION INTRAMUSCULAR; INTRAVENOUS AS NEEDED
Status: DISCONTINUED | OUTPATIENT
Start: 2021-04-25 | End: 2021-04-25

## 2021-04-25 RX ADMIN — OXYCODONE HYDROCHLORIDE 10 MG: 10 TABLET ORAL at 00:43

## 2021-04-25 RX ADMIN — METHOCARBAMOL 500 MG: 500 TABLET, FILM COATED ORAL at 23:50

## 2021-04-25 RX ADMIN — SODIUM CHLORIDE, SODIUM LACTATE, POTASSIUM CHLORIDE, AND CALCIUM CHLORIDE: .6; .31; .03; .02 INJECTION, SOLUTION INTRAVENOUS at 11:38

## 2021-04-25 RX ADMIN — HYDROMORPHONE HYDROCHLORIDE 0.5 MG: 1 INJECTION, SOLUTION INTRAMUSCULAR; INTRAVENOUS; SUBCUTANEOUS at 11:49

## 2021-04-25 RX ADMIN — DEXAMETHASONE SODIUM PHOSPHATE 10 MG: 10 INJECTION, SOLUTION INTRAMUSCULAR; INTRAVENOUS at 08:35

## 2021-04-25 RX ADMIN — OXYCODONE HYDROCHLORIDE 10 MG: 10 TABLET ORAL at 21:13

## 2021-04-25 RX ADMIN — GLYCOPYRROLATE 0.6 MG: 0.2 INJECTION, SOLUTION INTRAMUSCULAR; INTRAVENOUS at 11:45

## 2021-04-25 RX ADMIN — PROPOFOL 150 MG: 10 INJECTION, EMULSION INTRAVENOUS at 08:29

## 2021-04-25 RX ADMIN — METHOCARBAMOL 500 MG: 500 TABLET, FILM COATED ORAL at 17:23

## 2021-04-25 RX ADMIN — HYDROMORPHONE HYDROCHLORIDE 0.5 MG: 1 INJECTION, SOLUTION INTRAMUSCULAR; INTRAVENOUS; SUBCUTANEOUS at 03:17

## 2021-04-25 RX ADMIN — CEFAZOLIN SODIUM 2000 MG: 2 SOLUTION INTRAVENOUS at 14:30

## 2021-04-25 RX ADMIN — NEOSTIGMINE METHYLSULFATE 3 MG: 1 INJECTION INTRAVENOUS at 11:45

## 2021-04-25 RX ADMIN — ACETAMINOPHEN 975 MG: 325 TABLET ORAL at 14:15

## 2021-04-25 RX ADMIN — KETAMINE HYDROCHLORIDE 50 MG: 50 INJECTION, SOLUTION INTRAMUSCULAR; INTRAVENOUS at 08:40

## 2021-04-25 RX ADMIN — SODIUM CHLORIDE: 0.9 INJECTION, SOLUTION INTRAVENOUS at 08:34

## 2021-04-25 RX ADMIN — METHOCARBAMOL 500 MG: 500 TABLET, FILM COATED ORAL at 05:12

## 2021-04-25 RX ADMIN — SODIUM CHLORIDE, SODIUM GLUCONATE, SODIUM ACETATE, POTASSIUM CHLORIDE, MAGNESIUM CHLORIDE, SODIUM PHOSPHATE, DIBASIC, AND POTASSIUM PHOSPHATE 125 ML/HR: .53; .5; .37; .037; .03; .012; .00082 INJECTION, SOLUTION INTRAVENOUS at 00:45

## 2021-04-25 RX ADMIN — CEFAZOLIN SODIUM 2000 MG: 2 SOLUTION INTRAVENOUS at 22:35

## 2021-04-25 RX ADMIN — PHENYLEPHRINE HYDROCHLORIDE 200 MCG: 10 INJECTION INTRAVENOUS at 08:32

## 2021-04-25 RX ADMIN — MIDAZOLAM 2 MG: 1 INJECTION INTRAMUSCULAR; INTRAVENOUS at 08:19

## 2021-04-25 RX ADMIN — ALBUMIN (HUMAN): 12.5 INJECTION, SOLUTION INTRAVENOUS at 09:00

## 2021-04-25 RX ADMIN — ROCURONIUM BROMIDE 30 MG: 50 INJECTION, SOLUTION INTRAVENOUS at 09:15

## 2021-04-25 RX ADMIN — PHENYLEPHRINE HYDROCHLORIDE 400 MCG: 10 INJECTION INTRAVENOUS at 08:39

## 2021-04-25 RX ADMIN — PHENYLEPHRINE HYDROCHLORIDE 200 MCG: 10 INJECTION INTRAVENOUS at 08:29

## 2021-04-25 RX ADMIN — OXYCODONE HYDROCHLORIDE 10 MG: 10 TABLET ORAL at 14:16

## 2021-04-25 RX ADMIN — ROCURONIUM BROMIDE 50 MG: 50 INJECTION, SOLUTION INTRAVENOUS at 08:29

## 2021-04-25 RX ADMIN — ACETAMINOPHEN 975 MG: 325 TABLET ORAL at 05:12

## 2021-04-25 RX ADMIN — PHENYLEPHRINE HYDROCHLORIDE 100 MCG: 10 INJECTION INTRAVENOUS at 09:21

## 2021-04-25 RX ADMIN — LIDOCAINE HYDROCHLORIDE 100 MG: 20 INJECTION, SOLUTION INTRAVENOUS at 08:29

## 2021-04-25 RX ADMIN — FENTANYL CITRATE 50 MCG: 50 INJECTION INTRAMUSCULAR; INTRAVENOUS at 10:35

## 2021-04-25 RX ADMIN — CEFAZOLIN SODIUM 2000 MG: 2 SOLUTION INTRAVENOUS at 04:19

## 2021-04-25 RX ADMIN — CALCIUM CHLORIDE 0.25 G: 100 INJECTION INTRAVENOUS; INTRAVENTRICULAR at 09:35

## 2021-04-25 RX ADMIN — Medication 25 MCG: at 12:54

## 2021-04-25 RX ADMIN — FENTANYL CITRATE 25 MCG: 50 INJECTION INTRAMUSCULAR; INTRAVENOUS at 09:47

## 2021-04-25 RX ADMIN — DOCUSATE SODIUM 100 MG: 100 CAPSULE, LIQUID FILLED ORAL at 17:23

## 2021-04-25 RX ADMIN — ENOXAPARIN SODIUM 30 MG: 30 INJECTION SUBCUTANEOUS at 21:12

## 2021-04-25 RX ADMIN — ALBUMIN (HUMAN): 12.5 INJECTION, SOLUTION INTRAVENOUS at 09:34

## 2021-04-25 RX ADMIN — BACITRACIN 1 SMALL APPLICATION: 500 OINTMENT TOPICAL at 17:33

## 2021-04-25 RX ADMIN — HYDROMORPHONE HYDROCHLORIDE 0.5 MG: 1 INJECTION, SOLUTION INTRAMUSCULAR; INTRAVENOUS; SUBCUTANEOUS at 11:02

## 2021-04-25 RX ADMIN — SODIUM CHLORIDE, SODIUM LACTATE, POTASSIUM CHLORIDE, AND CALCIUM CHLORIDE 125 ML/HR: .6; .31; .03; .02 INJECTION, SOLUTION INTRAVENOUS at 22:39

## 2021-04-25 RX ADMIN — FENTANYL CITRATE 25 MCG: 50 INJECTION INTRAMUSCULAR; INTRAVENOUS at 10:13

## 2021-04-25 RX ADMIN — Medication 25 MCG: at 12:49

## 2021-04-25 RX ADMIN — METHOCARBAMOL 500 MG: 500 TABLET, FILM COATED ORAL at 00:43

## 2021-04-25 RX ADMIN — ALBUMIN (HUMAN): 12.5 INJECTION, SOLUTION INTRAVENOUS at 08:35

## 2021-04-25 RX ADMIN — ACETAMINOPHEN 975 MG: 325 TABLET ORAL at 21:12

## 2021-04-25 RX ADMIN — ONDANSETRON 4 MG: 2 INJECTION INTRAMUSCULAR; INTRAVENOUS at 11:21

## 2021-04-25 NOTE — QUICK NOTE
Post-op Check    S: Endorses pain in RLE and LLE since return from OR  No other complaints  O:  /83   Pulse 93   Temp 99 1 °F (37 3 °C)   Resp 16   Ht 5' 4" (1 626 m)   Wt 80 kg (176 lb 5 9 oz)   SpO2 99%   BMI 30 27 kg/m²   General: NAD   HEENT: normocephalic, atraumatic   MMM   CV: RRR, pulses   Pulm: normal work of breathing, b/l breath sounds present   GI: abdomen soft, non-distended, non-tender   : deferred   MSK: RLE immobilized, L elbow immobilized, cap refill <2s all extremities  Skin: warm and dry   Neuro: awake and alert, moves all extremities with good strength, face symmetric, speech normal   Psych: appropriate mood and affect        A/P:   Continue management as previously outlined  Pain control, PT, OT
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
## 2021-04-25 NOTE — PROCEDURES
Procedure- Orthopedics  Kelsi Chao 39 y o  male MRN: [id number]  Unit/Bed#: ED 21    Procedure: Left knee washout and wound closure     Two lacerations identified - anteromedial laceration measuring 5cm in length and direct anterior laceration 6cm in length  Once adequate anesthesia was obtained using IV ketamine administered by the trauma team, left knee lacerations were irrigated with 3L of sterile saline  Local anesthetic was provided with 10cc of 1% lidocaine using a 25 gauge needle to wound edges  Wound edges were sutures in a simple interrupted fashion using 2-0 prolene  Sterile dressing was then applied using xeroform, 4x4 gauzes, krelex and ace wrap  Pt tolerated the procedure well and was neurovascularly intact both pre and post procedure      Haley Poon MD

## 2021-04-25 NOTE — DISCHARGE INSTRUCTIONS
Discharge Instructions - Orthopedics  Jacqui Truong 39 y o  male MRN: [id number]  Unit/Bed#: [facility] 608-01    Weight Bearing Status: Toe touch weight bearing right lower extremity  Weight bearing as tolerated left lower extremity in knee immobilizer while ambulating to protect traumatic arthrotomy repair until sutures come out  Non weight bearing left upper extremity in dynamic elbow brace locked 0-30 degrees, OK for active ROM exercises    DVT prophylaxis:  Complete aspirin 325 mg twice daily for 28 days     Pain:  Continue analgesics as directed    Showering Instructions:   Do not shower until followup     Dressing Instructions:   Keep dressing clean, dry and intact until follow up appointment  Driving Instructions:  No driving until cleared by Orthopaedic Surgery  PT/OT:  Continue PT/OT on outpatient basis as directed    Appt Instructions: If you do not have your appointment, please call the clinic at [phone]  Otherwise followup as scheduled    Contact the office sooner if you experience any increased numbness/tingling in the extremities        Miscellaneous:  None    Discharge Instructions - Trauma   Jacqui Truong 39 y o  male MRN: [id number]  Unit/Bed#: [facility] 608-01  Injuries:   Left 4th and 5th rib fractures with associated pneumothorax which is now resolved   Right femoral shaft fracture, left triceps avulsion fracture, and left knee arthrotomy; repaired by orthopedics     Pain:   Continue pain regimen as prescribed     Notify physician/return to the ED if you experience any of the following:   Persistent chest pain or shortness of breath  Persistent lightheadedness/dizziness     Follow up:   Please call to schedule follow up with the trauma team in 2 weeks following discharge   Follow up as directed by the orthopedic team

## 2021-04-25 NOTE — OCCUPATIONAL THERAPY NOTE
Occupational Therapy Cancellation Note        Patient Name: Jasen Guzman  [id number] Date: 4/25/2021 04/25/21 0733   OT Last Visit   OT Visit Date 04/25/21   Note Type   Note type Evaluation   Cancel Reasons Patient to operating room-Consult received, chart reviewed  Pt currently in OR for an I&D L Knee and R Femoral IMN  Will evaluate post operatively  Carli ALAN, OTR/L
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
## 2021-04-25 NOTE — ANESTHESIA POSTPROCEDURE EVALUATION
Post-Op Assessment Note    CV Status:  Stable  Pain Score: 0    Pain management: adequate     Mental Status:  Alert and awake   Hydration Status:  Euvolemic   PONV Controlled:  Controlled   Airway Patency:  Patent      Post Op Vitals Reviewed: Yes      Staff: CRNA         No complications documented      BP   157/88   Temp   97 3   Pulse  95   Resp   16   SpO2   98% 3 L O2 NC

## 2021-04-25 NOTE — ASSESSMENT & PLAN NOTE
- s/p chest tube 4/24  - f/u repeat CXR this AM  - supplemental O2 as needed to maintain O2 sat > 92%  - pain control  - IS

## 2021-04-25 NOTE — ASSESSMENT & PLAN NOTE
- associated triceps avulsion  - seen on XR and MRI 4/24  - splint in place, NWB  - ortho consulted, possible ORIF today

## 2021-04-25 NOTE — PLAN OF CARE
Problem: Prexisting or High Potential for Compromised Skin Integrity  Goal: Skin integrity is maintained or improved  Description: INTERVENTIONS:  - Identify patients at risk for skin breakdown  - Assess and monitor skin integrity  - Assess and monitor nutrition and hydration status  - Monitor labs   - Assess for incontinence   - Turn and reposition patient  - Assist with mobility/ambulation  - Relieve pressure over bony prominences  - Avoid friction and shearing  - Provide appropriate hygiene as needed including keeping skin clean and dry  - Evaluate need for skin moisturizer/barrier cream  - Collaborate with interdisciplinary team   - Patient/family teaching  - Consider wound care consult   Outcome: Progressing     Problem: PAIN - ADULT  Goal: Verbalizes/displays adequate comfort level or baseline comfort level  Description: Interventions:  - Encourage patient to monitor pain and request assistance  - Assess pain using appropriate pain scale  - Administer analgesics based on type and severity of pain and evaluate response  - Implement non-pharmacological measures as appropriate and evaluate response  - Consider cultural and social influences on pain and pain management  - Notify physician/advanced practitioner if interventions unsuccessful or patient reports new pain  Outcome: Progressing     Problem: INFECTION - ADULT  Goal: Absence or prevention of progression during hospitalization  Description: INTERVENTIONS:  - Assess and monitor for signs and symptoms of infection  - Monitor lab/diagnostic results  - Monitor all insertion sites, i e  indwelling lines, tubes, and drains  - Monitor endotracheal if appropriate and nasal secretions for changes in amount and color  - [place] appropriate cooling/warming therapies per order  - Administer medications as ordered  - Instruct and encourage patient and family to use good hand hygiene technique  - Identify and instruct in appropriate isolation precautions for identified infection/condition  Outcome: Progressing     Problem: SAFETY ADULT  Goal: Patient will remain free of falls  Description: INTERVENTIONS:  - Assess patient frequently for physical needs  -  Identify cognitive and physical deficits and behaviors that affect risk of falls    -  [place] fall precautions as indicated by assessment   - Educate patient/family on patient safety including physical limitations  - Instruct patient to call for assistance with activity based on assessment  - Modify environment to reduce risk of injury  - Consider OT/PT consult to assist with strengthening/mobility  Outcome: Progressing  Goal: Maintain or return to baseline ADL function  Description: INTERVENTIONS:  -  Assess patient's ability to carry out ADLs; assess patient's baseline for ADL function and identify physical deficits which impact ability to perform ADLs (bathing, care of mouth/teeth, toileting, grooming, dressing, etc )  - Assess/evaluate cause of self-care deficits   - Assess range of motion  - Assess patient's mobility; develop plan if impaired  - Assess patient's need for assistive devices and provide as appropriate  - Encourage maximum independence but intervene and supervise when necessary  - Involve family in performance of ADLs  - Assess for home care needs following discharge   - Consider OT consult to assist with ADL evaluation and planning for discharge  - Provide patient education as appropriate  Outcome: Progressing  Goal: Maintain or return mobility status to optimal level  Description: INTERVENTIONS:  - Assess patient's baseline mobility status (ambulation, transfers, stairs, etc )    - Identify cognitive and physical deficits and behaviors that affect mobility  - Identify mobility aids required to assist with transfers and/or ambulation (gait belt, sit-to-stand, lift, walker, cane, etc )  - [place] fall precautions as indicated by assessment  - Record patient progress and toleration of activity level on Mobility SBAR; progress patient to next Phase/Stage  - Instruct patient to call for assistance with activity based on assessment  - Consider rehabilitation consult to assist with strengthening/weightbearing, etc   Outcome: Progressing     Problem: DISCHARGE PLANNING  Goal: Discharge to home or other facility with appropriate resources  Description: INTERVENTIONS:  - Identify barriers to discharge w/patient and caregiver  - Arrange for needed discharge resources and transportation as appropriate  - Identify discharge learning needs (meds, wound care, etc )  - Arrange for interpretive services to assist at discharge as needed  - Refer to Case Management Department for coordinating discharge planning if the patient needs post-hospital services based on physician/advanced practitioner order or complex needs related to functional status, cognitive ability, or social support system  Outcome: Progressing     Problem: Knowledge Deficit  Goal: Patient/family/caregiver demonstrates understanding of disease process, treatment plan, medications, and discharge instructions  Description: Complete learning assessment and assess knowledge base  Interventions:  - Provide teaching at level of understanding  - Provide teaching via preferred learning methods  Outcome: Progressing     Problem: Nutrition/Hydration-ADULT  Goal: Nutrient/Hydration intake appropriate for improving, restoring or maintaining nutritional needs  Description: Monitor and assess patient's nutrition/hydration status for malnutrition  Collaborate with interdisciplinary team and initiate plan and interventions as ordered  Monitor patient's weight and dietary intake as ordered or per policy  Utilize nutrition screening tool and intervene as necessary  Determine patient's food preferences and provide high-protein, high-caloric foods as appropriate       INTERVENTIONS:  - Monitor oral intake, urinary output, labs, and treatment plans  - Assess nutrition and hydration status and recommend course of action  - Evaluate amount of meals eaten  - Assist patient with eating if necessary   - Allow adequate time for meals  - Recommend/ encourage appropriate diets, oral nutritional supplements, and vitamin/mineral supplements  - Order, calculate, and assess calorie counts as needed  - Recommend, monitor, and adjust tube feedings and TPN/PPN based on assessed needs  - Assess need for intravenous fluids  - Provide specific nutrition/hydration education as appropriate  - Include patient/family/caregiver in decisions related to nutrition  Outcome: Progressing

## 2021-04-25 NOTE — PROGRESS NOTES
[address] [facility]  Progress Note Rosita Mercado [birth year], 39 y o  male MRN: [id number]  Unit/Bed#: [facility] 608-01 Encounter: [id number]  Primary Care Provider: No primary care provider on file  Date and time admitted to hospital: 4/24/2021 10:57 AM    Elevated troponin  Assessment & Plan  - peaked at 0 24 overnight   - likely due to blunt chest wall trauma  - monitor hemodynamic status, tachycardic initially, currently stable    Closed fracture of left olecranon process  Assessment & Plan  - associated triceps avulsion  - seen on XR and MRI 4/24  - splint in place, NWB  - ortho consulted, possible ORIF today    Acute blood loss anemia  Assessment & Plan  - received 1U PRBCs 4/24  - trend Hb, hemodynamic status    Open wound of left knee  Assessment & Plan  - 2 lacerations closed in ER 4/24  - f/u for suture removal  - local wound care      Closed displaced segmental fracture of shaft of right femur ([address] )  Assessment & Plan  - ortho consulted, plan for ORIF today  - currently in traction  - monitor extremity neurovascular exam  - continue ancef q8h given overlying wound  - trend Hb  - pain control  -PT/OT    Closed fracture of multiple ribs of left side  Assessment & Plan  - f/u rpt CXR this AM  - rip fx protocol  -IS/pulmonary toileting  - supplemental O2 as needed to maintain O2 sat > 92%  - PT/OT    Pneumothorax, left  Assessment & Plan  - s/p chest tube 4/24  - f/u repeat CXR this AM  - supplemental O2 as needed to maintain O2 sat > 92%  - pain control  - IS          Disposition: med surg      SUBJECTIVE:  Chief Complaint: leg and arm pain    Subjective: Pt continues to endorse pain in R leg, which remains in traction, and L arm, with is splinted  Was able to get some sleep last night  Denies pain anywhere else  Denies other complaints         OBJECTIVE:     Meds/Allergies     Current Facility-Administered Medications:     acetaminophen (TYLENOL) tablet 975 mg, 975 mg, Oral, Q8H [address], Marielena Chacon MD, 975 mg at 04/24/21 2126    ceFAZolin (ANCEF) IVPB (premix in dextrose) 2,000 mg 50 mL, 2,000 mg, Intravenous, Q8H, Rochelle Ramirez MD, Last Rate: 100 mL/hr at 04/25/21 0419, 2,000 mg at 04/25/21 0419    docusate sodium (COLACE) capsule 100 mg, 100 mg, Oral, BID, Marielena Chacon MD, 100 mg at 04/24/21 2127    enoxaparin (LOVENOX) subcutaneous injection 30 mg, 30 mg, Subcutaneous, Q12H [address], Marielena Chacon MD, 30 mg at 04/24/21 2126    HYDROmorphone (DILAUDID) injection 0 5 mg, 0 5 mg, Intravenous, Q3H PRN, Marielena Chacon MD, 0 5 mg at 04/25/21 0317    methocarbamol (ROBAXIN) tablet 500 mg, 500 mg, Oral, Q6H [address], Marielena Chacon MD, 500 mg at 04/25/21 0043    multi-electrolyte (PLASMALYTE-A/ISOLYTE-S PH 7 4) IV solution, 125 mL/hr, Intravenous, Continuous, Marielena Chacon MD, Last Rate: 125 mL/hr at 04/25/21 0045, 125 mL/hr at 04/25/21 0045    nicotine (NICODERM CQ) 7 mg/24hr TD 24 hr patch 1 patch, 1 patch, Transdermal, Daily, Marielena Chacon MD, 1 patch at 04/24/21 1434    ondansetron (ZOFRAN) injection 4 mg, 4 mg, Intravenous, Q6H PRN, Marielena Chacon MD    oxyCODONE (ROXICODONE) immediate release tablet 10 mg, 10 mg, Oral, Q4H PRN, Marielena Chacon MD, 10 mg at 04/25/21 0043    oxyCODONE (ROXICODONE) IR tablet 5 mg, 5 mg, Oral, Q4H PRN, [facility]) tablet 17 2 mg, 2 tablet, Oral, Daily, Marielena Chacon MD, 17 2 mg at 04/24/21 2126     Vitals:   Vitals:    04/24/21 2312   BP: 111/71   Pulse: 85   Resp: 18   Temp: 98 6 °F (37 °C)   SpO2: 99%       Intake/Output:  I/O       04/23 0701 - 04/24 0700 04/24 0701 - 04/25 0700    IV Piggyback  80    Total Intake(mL/kg)  80 (1)    Net  +80                 Nutrition/GI Proph/Bowel Reg: NPO, na, senokot    Physical Exam:   General: NAD   HEENT: normocephalic, atraumatic   MMM   CV: RRR, pulses   Pulm: normal work of breathing, b/l breath sounds present   GI: abdomen soft, non-distended, non-tender   : deferred MSK: RLE in traction, able to move toes, cap refill <2 seconds  R forearm in splint, able to move fingers with good strength and ROM, cap refill <2 seconds  Skin: warm and dry   Neuro: awake and alert, face symmetric, speech normal   Psych: appropriate mood and affect       Invasive Devices     Peripheral Intravenous Line            Peripheral IV 04/24/21 Right Antecubital less than 1 day    Peripheral IV 04/24/21 Right Hand less than 1 day          Drain            Chest Tube 1 Left Fourth intercostal space 24 Fr  less than 1 day    Urethral Catheter Coude less than 1 day                 Lab Results: Results: I have personally reviewed pertinent reports       Results Reviewed     Procedure Component Value Units Date/Time    Troponin I [[id number]]  (Abnormal) Collected: 04/24/21 1924    Lab Status: Final result Specimen: Blood from Arm, Right Updated: 04/24/21 1949     Troponin I 0 48 ng/mL     Hemoglobin and hematocrit, blood [[id number]]  (Normal) Collected: 04/24/21 1924    Lab Status: Final result Specimen: Blood from Arm, Right Updated: 04/24/21 1932     Hemoglobin 14 2 g/dL      Hematocrit 42 3 %     CKMB [[id number]]  (Normal) Collected: 04/24/21 1116    Lab Status: Final result Specimen: Blood from Arm, Right Updated: 04/24/21 1204     CK-MB Index 1 1 %      CK-MB 2 0 ng/mL     Comprehensive metabolic panel [[id number]]  (Abnormal) Collected: 04/24/21 1116    Lab Status: Final result Specimen: Blood from Arm, Right Updated: 04/24/21 1146     Sodium 141 mmol/L      Potassium 4 5 mmol/L      Chloride 114 mmol/L      CO2 25 mmol/L      ANION GAP 2 mmol/L      BUN 19 mg/dL      Creatinine 1 27 mg/dL      Glucose 133 mg/dL      Calcium 8 4 mg/dL      Corrected Calcium 8 9 mg/dL      AST 24 U/L      ALT 40 U/L      Alkaline Phosphatase 81 U/L      Total Protein 7 1 g/dL      Albumin 3 4 g/dL      Total Bilirubin 0 37 mg/dL      eGFR 32 ml/min/1 73sq m     Narrative:      [place] guidelines for Chronic Kidney Disease (CKD):     Stage 1 with normal or high GFR (GFR > 90 mL/min/1 73 square meters)    Stage 2 Mild CKD (GFR = 60-89 mL/min/1 73 square meters)    Stage 3A Moderate CKD (GFR = 45-59 mL/min/1 73 square meters)    Stage 3B Moderate CKD (GFR = 30-44 mL/min/1 73 square meters)    Stage 4 Severe CKD (GFR = 15-29 mL/min/1 73 square meters)    Stage 5 End Stage CKD (GFR <15 mL/min/1 73 square meters)  Note: GFR calculation is accurate only with a steady state creatinine    Lactic acid, plasma [[id number]]  (Normal) Collected: 04/24/21 1116    Lab Status: Final result Specimen: Blood from Arm, Right Updated: 04/24/21 1146     LACTIC ACID 1 9 mmol/L     Narrative:      Result may be elevated if tourniquet was used during collection      Troponin I [[id number]]  (Abnormal) Collected: 04/24/21 1116    Lab Status: Final result Specimen: Blood from Arm, Right Updated: 04/24/21 1146     Troponin I 0 15 ng/mL     CK (with reflex to MB) [[id number]]  (Normal) Collected: 04/24/21 1116    Lab Status: Final result Specimen: Blood from Arm, Right Updated: 04/24/21 1146     Total  U/L     Protime-INR [[id number]]  (Normal) Collected: 04/24/21 1116    Lab Status: Final result Specimen: Blood from Arm, Right Updated: 04/24/21 1141     Protime 12 3 seconds      INR 0 92    APTT [[id number]]  (Normal) Collected: 04/24/21 1116    Lab Status: Final result Specimen: Blood from Arm, Right Updated: 04/24/21 1141     PTT 23 seconds     Ethanol [[id number]]  (Normal) Collected: 04/24/21 1116    Lab Status: Final result Specimen: Blood from Arm, Right Updated: 04/24/21 1140     Ethanol Lvl <3 mg/dL     CBC and differential [[id number]]  (Abnormal) Collected: 04/24/21 1116    Lab Status: Final result Specimen: Blood from Arm, Right Updated: 04/24/21 1126     WBC 11 44 Thousand/uL      RBC 4 76 Million/uL      Hemoglobin 14 5 g/dL      Hematocrit 43 8 %      MCV 92 fL      MCH 30 5 pg      MCHC 33 1 g/dL      RDW 12 6 %      MPV 8 9 fL      Platelets 039 Thousands/uL      nRBC 0 /100 WBCs      Neutrophils Relative 45 %      Immat GRANS % 1 %      Lymphocytes Relative 45 %      Monocytes Relative 6 %      Eosinophils Relative 2 %      Basophils Relative 1 %      Neutrophils Absolute 5 10 Thousands/µL      Immature Grans Absolute 0 16 Thousand/uL      Lymphocytes Absolute 5 19 Thousands/µL      Monocytes Absolute 0 65 Thousand/µL      Eosinophils Absolute 0 28 Thousand/µL      Basophils Absolute 0 06 Thousands/µL     POCT Blood Gas (CG8+) [[id number]]  (Abnormal) Collected: 04/24/21 1110    Lab Status: Final result Specimen: Venous Updated: 04/24/21 1114     ph, Micky ISTAT 7 367     pCO2, Micky i-STAT 39 1 mm HG      pO2, Micky i-STAT 65 0 mm HG      BE, i-STAT -3 mmol/L      HCO3, Micky i-STAT 22 5 mmol/L      CO2, i-STAT 24 mmol/L      O2 Sat, i-STAT 92 %      SODIUM, I-STAT 141 mmol/l      Potassium, i-STAT 4 8 mmol/L      Hct, i-STAT 42 %      Hgb, i-STAT 14 3 g/dl      Glucose, i-STAT 129 mg/dl      Specimen Type VENOUS          Imaging/EKG Studies: Results: I have personally reviewed pertinent reports  Xr Chest Portable    Result Date: 4/24/2021  Impression: Status post left chest tube placement, with resolution of left pneumothorax  Workstation performed: [id number]     Xr Elbow 3+ Vw Left    Result Date: 4/24/2021  Impression: Acute avulsion fracture of the olecranon process at the triceps tendon insertion  Fracture fragment is displaced by 1 5 cm  Workstation performed: [id number]     Xr Femur 2 Vw Right    Result Date: 4/24/2021  Impression: Acute comminuted right mid femoral diaphysis fracture   Workstation performed: [id number]     Trauma - Ct Head Wo Contrast    Result Date: 4/24/2021  Impression: No acute intracranial hemorrhage seen No extra-axial collection No mass effect Markedly limited study due to suboptimal positioning and streak artifact a repeat study may be considered after resuscitation  I personally discussed this study with Brandt Miller on 4/24/2021 at 12:44 PM  4 Workstation performed: [id number]     Trauma - Ct Spine Cervical Wo Contrast    Result Date: 4/24/2021  Impression: No acute compression collapse of the vertebra No significant central canal narrowing  I personally discussed this study with Brandt Miller on 4/24/2021 at 12:41 PM   Workstation performed: [id number]     Mri Elbow Left Wo Contrast    Result Date: 4/24/2021  Impression: Exam is severely limited due to difficulty positioning the patient due to multiple traumatic injuries and patient motion  There is an avulsion fracture of the olecranon process at the triceps insertion site, which is better visualized on the plain films earlier today (series 8 image 22, and series 11 images 16-18 )  No additional injuries note but please note that this exam  is severely limited, particularly in regards to the ligaments of the elbow  Workstation performed: [id number]     Xr Trauma Multiple    Result Date: 4/24/2021  Impression: Left-sided pneumothorax  Nondisplaced fracture should be correlated with follow-up CT  No acute fractures identified of the bony pelvis  Comminuted fracture of the right femoral mid shaft  Correlate with separate CT left thoracotomy report for findings related to a medial femoral condyle fracture and large soft tissue laceration  Workstation performed: [id number]     Cta Chest Abdomen Pelvis W Wo Contrast    Result Date: 4/24/2021  Impression: Fracture of the left heart, 4th, 5th rib with moderate-sized left pneumothorax, approximately 40%   Pleural hematoma underlying the area of the rib fracture No solid visceral injury in the abdomen No mediastinal hematoma Intact aorta with no evidence of extravasation, dissection flap  I personally discussed this study with Brandt Miller on 4/24/2021 at 12:43 PM  Workstation performed: [id number]     Ct Lower Extremity Wo Contrast Left    Result Date: 4/24/2021  Impression: There is intra-articular fracture of the medial femoral condyle with 9 mm x 12 mm osteochondral defect through the anteromedial aspect of the medial femoral condyle Laceration seen at the medial aspect of the knee extending up to the level of the bone and also associated with intra-articular air Comminuted fracture of the midshaft of the femur  I personally discussed this study with Honorio Jarrett on 4/24/2021 at 12:40 PM   Workstation performed: [id number]     Other Studies:   VTE Prophylaxis: Enoxaparin (Lovenox)

## 2021-04-25 NOTE — PLAN OF CARE
Problem: Prexisting or High Potential for Compromised Skin Integrity  Goal: Skin integrity is maintained or improved  Description: INTERVENTIONS:  - Identify patients at risk for skin breakdown  - Assess and monitor skin integrity  - Assess and monitor nutrition and hydration status  - Monitor labs   - Assess for incontinence   - Turn and reposition patient  - Assist with mobility/ambulation  - Relieve pressure over bony prominences  - Avoid friction and shearing  - Provide appropriate hygiene as needed including keeping skin clean and dry  - Evaluate need for skin moisturizer/barrier cream  - Collaborate with interdisciplinary team   - Patient/family teaching  - Consider wound care consult   4/25/2021 0018 by Marvel King RN  Outcome: Progressing  4/25/2021 0018 by Marvel Kign RN  Outcome: Progressing     Problem: PAIN - ADULT  Goal: Verbalizes/displays adequate comfort level or baseline comfort level  Description: Interventions:  - Encourage patient to monitor pain and request assistance  - Assess pain using appropriate pain scale  - Administer analgesics based on type and severity of pain and evaluate response  - Implement non-pharmacological measures as appropriate and evaluate response  - Consider cultural and social influences on pain and pain management  - Notify physician/advanced practitioner if interventions unsuccessful or patient reports new pain  Outcome: Progressing     Problem: INFECTION - ADULT  Goal: Absence or prevention of progression during hospitalization  Description: INTERVENTIONS:  - Assess and monitor for signs and symptoms of infection  - Monitor lab/diagnostic results  - Monitor all insertion sites, i e  indwelling lines, tubes, and drains  - Monitor endotracheal if appropriate and nasal secretions for changes in amount and color  - [place] appropriate cooling/warming therapies per order  - Administer medications as ordered  - Instruct and encourage patient and family to use good hand hygiene technique  - Identify and instruct in appropriate isolation precautions for identified infection/condition  Outcome: Progressing  Goal: Absence of fever/infection during neutropenic period  Description: INTERVENTIONS:  - Monitor WBC    Outcome: Progressing     Problem: SAFETY ADULT  Goal: Patient will remain free of falls  Description: INTERVENTIONS:  - Assess patient frequently for physical needs  -  Identify cognitive and physical deficits and behaviors that affect risk of falls    -  [place] fall precautions as indicated by assessment   - Educate patient/family on patient safety including physical limitations  - Instruct patient to call for assistance with activity based on assessment  - Modify environment to reduce risk of injury  - Consider OT/PT consult to assist with strengthening/mobility  Outcome: Progressing  Goal: Maintain or return to baseline ADL function  Description: INTERVENTIONS:  -  Assess patient's ability to carry out ADLs; assess patient's baseline for ADL function and identify physical deficits which impact ability to perform ADLs (bathing, care of mouth/teeth, toileting, grooming, dressing, etc )  - Assess/evaluate cause of self-care deficits   - Assess range of motion  - Assess patient's mobility; develop plan if impaired  - Assess patient's need for assistive devices and provide as appropriate  - Encourage maximum independence but intervene and supervise when necessary  - Involve family in performance of ADLs  - Assess for home care needs following discharge   - Consider OT consult to assist with ADL evaluation and planning for discharge  - Provide patient education as appropriate  Outcome: Progressing  Goal: Maintain or return mobility status to optimal level  Description: INTERVENTIONS:  - Assess patient's baseline mobility status (ambulation, transfers, stairs, etc )    - Identify cognitive and physical deficits and behaviors that affect mobility  - Identify mobility aids required to assist with transfers and/or ambulation (gait belt, sit-to-stand, lift, walker, cane, etc )  - [place] fall precautions as indicated by assessment  - Record patient progress and toleration of activity level on Mobility SBAR; progress patient to next Phase/Stage  - Instruct patient to call for assistance with activity based on assessment  - Consider rehabilitation consult to assist with strengthening/weightbearing, etc   Outcome: Progressing     Problem: DISCHARGE PLANNING  Goal: Discharge to home or other facility with appropriate resources  Description: INTERVENTIONS:  - Identify barriers to discharge w/patient and caregiver  - Arrange for needed discharge resources and transportation as appropriate  - Identify discharge learning needs (meds, wound care, etc )  - Arrange for interpretive services to assist at discharge as needed  - Refer to Case Management Department for coordinating discharge planning if the patient needs post-hospital services based on physician/advanced practitioner order or complex needs related to functional status, cognitive ability, or social support system  Outcome: Progressing     Problem: Knowledge Deficit  Goal: Patient/family/caregiver demonstrates understanding of disease process, treatment plan, medications, and discharge instructions  Description: Complete learning assessment and assess knowledge base  Interventions:  - Provide teaching at level of understanding  - Provide teaching via preferred learning methods  Outcome: Progressing     Problem: Nutrition/Hydration-ADULT  Goal: Nutrient/Hydration intake appropriate for improving, restoring or maintaining nutritional needs  Description: Monitor and assess patient's nutrition/hydration status for malnutrition  Collaborate with interdisciplinary team and initiate plan and interventions as ordered  Monitor patient's weight and dietary intake as ordered or per policy   Utilize nutrition screening tool and intervene as necessary  Determine patient's food preferences and provide high-protein, high-caloric foods as appropriate       INTERVENTIONS:  - Monitor oral intake, urinary output, labs, and treatment plans  - Assess nutrition and hydration status and recommend course of action  - Evaluate amount of meals eaten  - Assist patient with eating if necessary   - Allow adequate time for meals  - Recommend/ encourage appropriate diets, oral nutritional supplements, and vitamin/mineral supplements  - Order, calculate, and assess calorie counts as needed  - Recommend, monitor, and adjust tube feedings and TPN/PPN based on assessed needs  - Assess need for intravenous fluids  - Provide specific nutrition/hydration education as appropriate  - Include patient/family/caregiver in decisions related to nutrition  Outcome: Progressing

## 2021-04-25 NOTE — ASSESSMENT & PLAN NOTE
- ortho consulted, plan for ORIF today  - currently in traction  - monitor extremity neurovascular exam  - continue ancef q8h given overlying wound  - trend Hb  - pain control  -PT/OT

## 2021-04-25 NOTE — ANESTHESIA PROCEDURE NOTES
Arterial Line Insertion  Performed by: Yadira Scott CRNA  Authorized by: Darlene Fairbanks MD   Preparation: Patient was prepped and draped in the usual sterile fashion    Indications: hemodynamic monitoring  Orientation:  Right  Location: radial artery  Procedure Details:  Needle gauge: 20  Seldinger technique: Seldinger technique used  Number of attempts: 2    Post-procedure:  Post-procedure: dressing applied  Waveform: good waveform  Post-procedure CNS: normal  Patient tolerance: Patient tolerated the procedure well with no immediate complications

## 2021-04-25 NOTE — ASSESSMENT & PLAN NOTE
- f/u rpt CXR this AM  - rip fx protocol  -IS/pulmonary toileting  - supplemental O2 as needed to maintain O2 sat > 92%  - PT/OT

## 2021-04-25 NOTE — PROGRESS NOTES
Patient refused to roll for a skin check, to bathe his backside, and to place a foam allevyn on his sacrum in prep for surgery  Linens changed

## 2021-04-25 NOTE — PROGRESS NOTES
Patient escorted to OR holding with RN, with ross's traction in place, on portable heart/O2 sensor and portable suction machine attached to chest tube  Patient's wife at bedside
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
## 2021-04-26 ENCOUNTER — APPOINTMENT (INPATIENT)
Dept: RADIOLOGY | Facility: HOSPITAL | Age: 41
DRG: 956 | End: 2021-04-26
Payer: COMMERCIAL

## 2021-04-26 ENCOUNTER — TELEPHONE (OUTPATIENT)
Dept: RADIOLOGY | Facility: HOSPITAL | Age: 41
End: 2021-04-26

## 2021-04-26 LAB
ANION GAP SERPL CALCULATED.3IONS-SCNC: 5 MMOL/L (ref 4–13)
ATRIAL RATE: 71 BPM
ATRIAL RATE: 79 BPM
BASOPHILS # BLD AUTO: 0.01 THOUSANDS/ΜL (ref 0–0.1)
BASOPHILS NFR BLD AUTO: 0 % (ref 0–1)
BUN SERPL-MCNC: 18 MG/DL (ref 5–25)
CALCIUM SERPL-MCNC: 8 MG/DL (ref 8.3–10.1)
CHLORIDE SERPL-SCNC: 106 MMOL/L (ref 100–108)
CO2 SERPL-SCNC: 25 MMOL/L (ref 21–32)
CREAT SERPL-MCNC: 1.03 MG/DL (ref 0.6–1.3)
EOSINOPHIL # BLD AUTO: 0 THOUSAND/ΜL (ref 0–0.61)
EOSINOPHIL NFR BLD AUTO: 0 % (ref 0–6)
ERYTHROCYTE [DISTWIDTH] IN BLOOD BY AUTOMATED COUNT: 12.5 % (ref 11.6–15.1)
GFR SERPL CREATININE-BSD FRML MDRD: 90 ML/MIN/1.73SQ M
GLUCOSE SERPL-MCNC: 126 MG/DL (ref 65–140)
HCT VFR BLD AUTO: 22.1 % (ref 36.5–49.3)
HGB BLD-MCNC: 7.4 G/DL (ref 12–17)
IMM GRANULOCYTES # BLD AUTO: 0.11 THOUSAND/UL (ref 0–0.2)
IMM GRANULOCYTES NFR BLD AUTO: 1 % (ref 0–2)
LYMPHOCYTES # BLD AUTO: 1.64 THOUSANDS/ΜL (ref 0.6–4.47)
LYMPHOCYTES NFR BLD AUTO: 15 % (ref 14–44)
MCH RBC QN AUTO: 30.8 PG (ref 26.8–34.3)
MCHC RBC AUTO-ENTMCNC: 33.5 G/DL (ref 31.4–37.4)
MCV RBC AUTO: 92 FL (ref 82–98)
MONOCYTES # BLD AUTO: 1.1 THOUSAND/ΜL (ref 0.17–1.22)
MONOCYTES NFR BLD AUTO: 10 % (ref 4–12)
NEUTROPHILS # BLD AUTO: 7.81 THOUSANDS/ΜL (ref 1.85–7.62)
NEUTS SEG NFR BLD AUTO: 74 % (ref 43–75)
NRBC BLD AUTO-RTO: 0 /100 WBCS
P AXIS: 16 DEGREES
P AXIS: 54 DEGREES
PLATELET # BLD AUTO: 162 THOUSANDS/UL (ref 149–390)
PMV BLD AUTO: 9.5 FL (ref 8.9–12.7)
POTASSIUM SERPL-SCNC: 4.5 MMOL/L (ref 3.5–5.3)
PR INTERVAL: 118 MS
PR INTERVAL: 140 MS
QRS AXIS: -6 DEGREES
QRS AXIS: 20 DEGREES
QRSD INTERVAL: 92 MS
QRSD INTERVAL: 96 MS
QT INTERVAL: 360 MS
QT INTERVAL: 372 MS
QTC INTERVAL: 404 MS
QTC INTERVAL: 412 MS
RBC # BLD AUTO: 2.4 MILLION/UL (ref 3.88–5.62)
SODIUM SERPL-SCNC: 136 MMOL/L (ref 136–145)
T WAVE AXIS: -5 DEGREES
T WAVE AXIS: 59 DEGREES
VENTRICULAR RATE: 71 BPM
VENTRICULAR RATE: 79 BPM
WBC # BLD AUTO: 10.67 THOUSAND/UL (ref 4.31–10.16)

## 2021-04-26 PROCEDURE — 0WPB30Z REMOVAL OF DRAINAGE DEVICE FROM LEFT PLEURAL CAVITY, PERCUTANEOUS APPROACH: ICD-10-PCS | Performed by: ORTHOPAEDIC SURGERY

## 2021-04-26 PROCEDURE — 80048 BASIC METABOLIC PNL TOTAL CA: CPT | Performed by: PHYSICIAN ASSISTANT

## 2021-04-26 PROCEDURE — NC001 PR NO CHARGE: Performed by: ORTHOPAEDIC SURGERY

## 2021-04-26 PROCEDURE — 85025 COMPLETE CBC W/AUTO DIFF WBC: CPT | Performed by: PHYSICIAN ASSISTANT

## 2021-04-26 PROCEDURE — 97110 THERAPEUTIC EXERCISES: CPT

## 2021-04-26 PROCEDURE — 99232 SBSQ HOSP IP/OBS MODERATE 35: CPT | Performed by: SURGERY

## 2021-04-26 PROCEDURE — 71045 X-RAY EXAM CHEST 1 VIEW: CPT

## 2021-04-26 PROCEDURE — 97163 PT EVAL HIGH COMPLEX 45 MIN: CPT

## 2021-04-26 PROCEDURE — 97167 OT EVAL HIGH COMPLEX 60 MIN: CPT

## 2021-04-26 PROCEDURE — 94760 N-INVAS EAR/PLS OXIMETRY 1: CPT

## 2021-04-26 PROCEDURE — 93010 ELECTROCARDIOGRAM REPORT: CPT | Performed by: INTERNAL MEDICINE

## 2021-04-26 RX ADMIN — ACETAMINOPHEN 975 MG: 325 TABLET ORAL at 06:13

## 2021-04-26 RX ADMIN — OXYCODONE HYDROCHLORIDE 10 MG: 10 TABLET ORAL at 16:36

## 2021-04-26 RX ADMIN — METHOCARBAMOL 500 MG: 500 TABLET, FILM COATED ORAL at 23:42

## 2021-04-26 RX ADMIN — ACETAMINOPHEN 975 MG: 325 TABLET ORAL at 13:11

## 2021-04-26 RX ADMIN — HYDROMORPHONE HYDROCHLORIDE 0.5 MG: 1 INJECTION, SOLUTION INTRAMUSCULAR; INTRAVENOUS; SUBCUTANEOUS at 13:11

## 2021-04-26 RX ADMIN — ACETAMINOPHEN 975 MG: 325 TABLET ORAL at 23:25

## 2021-04-26 RX ADMIN — BACITRACIN 1 SMALL APPLICATION: 500 OINTMENT TOPICAL at 17:37

## 2021-04-26 RX ADMIN — HYDROMORPHONE HYDROCHLORIDE 0.5 MG: 1 INJECTION, SOLUTION INTRAMUSCULAR; INTRAVENOUS; SUBCUTANEOUS at 09:00

## 2021-04-26 RX ADMIN — OXYCODONE HYDROCHLORIDE 10 MG: 10 TABLET ORAL at 20:26

## 2021-04-26 RX ADMIN — METHOCARBAMOL 500 MG: 500 TABLET, FILM COATED ORAL at 06:13

## 2021-04-26 RX ADMIN — SENNOSIDES 17.2 MG: 8.6 TABLET, FILM COATED ORAL at 08:57

## 2021-04-26 RX ADMIN — CEFAZOLIN SODIUM 2000 MG: 2 SOLUTION INTRAVENOUS at 06:14

## 2021-04-26 RX ADMIN — METHOCARBAMOL 500 MG: 500 TABLET, FILM COATED ORAL at 17:37

## 2021-04-26 RX ADMIN — OXYCODONE HYDROCHLORIDE 10 MG: 10 TABLET ORAL at 06:19

## 2021-04-26 RX ADMIN — DOCUSATE SODIUM 100 MG: 100 CAPSULE, LIQUID FILLED ORAL at 17:37

## 2021-04-26 RX ADMIN — OXYCODONE HYDROCHLORIDE 10 MG: 10 TABLET ORAL at 10:21

## 2021-04-26 RX ADMIN — METHOCARBAMOL 500 MG: 500 TABLET, FILM COATED ORAL at 11:38

## 2021-04-26 RX ADMIN — ENOXAPARIN SODIUM 30 MG: 30 INJECTION SUBCUTANEOUS at 08:58

## 2021-04-26 RX ADMIN — ENOXAPARIN SODIUM 30 MG: 30 INJECTION SUBCUTANEOUS at 20:26

## 2021-04-26 RX ADMIN — DOCUSATE SODIUM 100 MG: 100 CAPSULE, LIQUID FILLED ORAL at 08:58

## 2021-04-26 RX ADMIN — BACITRACIN 1 SMALL APPLICATION: 500 OINTMENT TOPICAL at 08:58

## 2021-04-26 NOTE — ASSESSMENT & PLAN NOTE
- Associated triceps avulsion  - Seen on XR and MRI 4/24  - S/P open triceps repair 4/25 with Ortho  - NWB LUE in sling and splint

## 2021-04-26 NOTE — ASSESSMENT & PLAN NOTE
- 2 lacerations closed 4/24 by ortho   - S/P operative washout 4/25   - WBAT LLE  - F/U with orthopedics for suture removal

## 2021-04-26 NOTE — RESPIRATORY THERAPY NOTE
resp care      04/26/21 0821   Respiratory Assessment   Cough None   Resp Comments d/c'd acp per protocol
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
## 2021-04-26 NOTE — PROGRESS NOTES
Progress Note - Orthopedics   [address] 39 y o  male MRN: [id number]  [facility]      Subjective:    39 y o male POD 1 right retrograde femoral IMN, left knee washout for traumatic arthrotomy and left open triceps tendon repair  No acute events, no complaints  Pt doing well  Pain controlled   Denies fevers chills, CP, SOB    Labs:  0   Lab Value Date/Time    HCT 22 1 (L) 04/26/2021 0522    HCT 25 1 (L) 04/25/2021 1420    HCT 25 (L) 04/25/2021 0928    HCT 36 3 (L) 04/25/2021 0352    HGB 7 4 (L) 04/26/2021 0522    HGB 8 5 (L) 04/25/2021 1420    HGB 8 5 (L) 04/25/2021 0928    HGB 12 2 04/25/2021 0352    INR 0 92 04/24/2021 1116    WBC 10 67 (H) 04/26/2021 0522    WBC 8 36 04/25/2021 1420    WBC 11 66 (H) 04/25/2021 0352       Meds:    Current Facility-Administered Medications:     acetaminophen (TYLENOL) tablet 975 mg, 975 mg, Oral, Q8H [address], Ronda Milton MD, 975 mg at 04/26/21 5887    bacitracin topical ointment 1 small application, 1 small application, Topical, BID, Lj Suarez DO, 1 small application at 79/71/49 1733    ceFAZolin (ANCEF) IVPB (premix in dextrose) 2,000 mg 50 mL, 2,000 mg, Intravenous, Q8H, Ronda Milton MD, Last Rate: 100 mL/hr at 04/26/21 0614, 2,000 mg at 04/26/21 0440    docusate sodium (COLACE) capsule 100 mg, 100 mg, Oral, BID, Ronda Milton MD, 100 mg at 04/25/21 1723    enoxaparin (LOVENOX) subcutaneous injection 30 mg, 30 mg, Subcutaneous, Q12H [address], Ronda Milton MD, 30 mg at 04/25/21 2112    HYDROmorphone (DILAUDID) injection 0 5 mg, 0 5 mg, Intravenous, Q3H PRN, Ronda Milton MD, 0 5 mg at 04/25/21 0317    lactated ringers infusion, 125 mL/hr, Intravenous, Continuous, Han Wong MD, Last Rate: 125 mL/hr at 04/25/21 2239, 125 mL/hr at 04/25/21 2239    methocarbamol (ROBAXIN) tablet 500 mg, 500 mg, Oral, Q6H [address], Ronda Milton MD, 500 mg at 04/26/21 0613    nicotine (NICODERM CQ) 7 mg/24hr TD 24 hr patch 1 patch, 1 patch, Transdermal, Daily, Ronnell Severs, MD, 1 patch at 04/24/21 1434    ondansetron (ZOFRAN) injection 4 mg, 4 mg, Intravenous, Q6H PRN, Ronnell Severs, MD    oxyCODONE (ROXICODONE) immediate release tablet 10 mg, 10 mg, Oral, Q4H PRN, Ronnell Severs, MD, 10 mg at 04/26/21 7156    oxyCODONE (ROXICODONE) IR tablet 5 mg, 5 mg, Oral, Q4H PRN, Ronnell Severs, MD    senna (SENOKOT) tablet 17 2 mg, 2 tablet, Oral, Daily, Ronnell Severs, MD, 17 2 mg at 04/24/21 2126    Blood Culture:   No results found for: BLOODCX    Wound Culture:   No results found for: WOUNDCULT    Ins and Outs:  I/O last 24 hours: In: 5262 5 [P O :240; I V :4062 5; IV Piggyback:960]  Out: 4624 [Urine:1050; Blood:50; Chest Tube:140]          Physical:  Vitals:    04/26/21 0256   BP: 106/66   Pulse: 98   Resp: 18   Temp:    SpO2: 98%     Musculoskeletal: left Upper Extremity  · Dressing c/d/i  · TTP left elbow  · SILT m/r/u   ·  Motor intact ain/pin/m/r/u  ·  2+ radial pulse    Bilateral lower extremity  · Dressing c/d/i  · TTP bilateral knees  · SILT s/s/sp/dp/t    · +fhl/ehl, +ankle dorsi/plantar flexion  · 2+ DP pulse    Assessment:    41 y o male POD 1 right retrograde femoral IMN, left knee washout for traumatic arthrotomy and left open triceps tendon repair  Patient should complete 24 hours of ancef s/p surgery to prevent infection       Plan:  · NWB LUE in splint and sling, WBAT LLE, TTWB RLE  · Patient has acute blood loss anemia, will monitor and administer IVF/prbc as indicated   · Ancef x 24 hours  · PT/OT  · Pain control  · DVT ppx  · Dispo: Ortho will follow    Barb Santos MD
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
## 2021-04-26 NOTE — CASE MANAGEMENT
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, and is independent for all ADL/IADLs  Pt's had 0 falls in the last 6 months  Pt enjoys working and fixing cars  Pt's aunt has wheelchair and commode at home  Pt doesn't have a living will  Pt's pharmacy is [facility]  Pt reports no hx of mental health, substance abuse, IP rehab, or VNA  Pt is recommended for IP rehab  Pt has no medical insurance  PATHS referral placed  Pt's auto insurance is through   [address] [place] [address]    CM reviewed d/c planning process including the following: identifying help at home, patient preference for d/c planning needs, Discharge Lounge, [facility] Meds to Bed program, availability of treatment team to discuss questions or concerns patient and/or family may have regarding understanding medications and recognizing signs and symptoms once discharged  CM also encouraged patient to follow up with all recommended appointments after discharge  Patient advised of importance for patient and family to participate in managing patients medical well being

## 2021-04-26 NOTE — ASSESSMENT & PLAN NOTE
- Cr 1 03 this AM, down from 1 4  - IVF discontinued as patient is tolerating diet and Cr normalized
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
## 2021-04-26 NOTE — PLAN OF CARE
Problem: Prexisting or High Potential for Compromised Skin Integrity  Goal: Skin integrity is maintained or improved  Description: INTERVENTIONS:  - Identify patients at risk for skin breakdown  - Assess and monitor skin integrity  - Assess and monitor nutrition and hydration status  - Monitor labs   - Assess for incontinence   - Turn and reposition patient  - Assist with mobility/ambulation  - Relieve pressure over bony prominences  - Avoid friction and shearing  - Provide appropriate hygiene as needed including keeping skin clean and dry  - Evaluate need for skin moisturizer/barrier cream  - Collaborate with interdisciplinary team   - Patient/family teaching  - Consider wound care consult   Outcome: Progressing     Problem: PAIN - ADULT  Goal: Verbalizes/displays adequate comfort level or baseline comfort level  Description: Interventions:  - Encourage patient to monitor pain and request assistance  - Assess pain using appropriate pain scale  - Administer analgesics based on type and severity of pain and evaluate response  - Implement non-pharmacological measures as appropriate and evaluate response  - Consider cultural and social influences on pain and pain management  - Notify physician/advanced practitioner if interventions unsuccessful or patient reports new pain  Outcome: Progressing     Problem: SAFETY ADULT  Goal: Patient will remain free of falls  Description: INTERVENTIONS:  - Assess patient frequently for physical needs  -  Identify cognitive and physical deficits and behaviors that affect risk of falls    -  [place] fall precautions as indicated by assessment   - Educate patient/family on patient safety including physical limitations  - Instruct patient to call for assistance with activity based on assessment  - Modify environment to reduce risk of injury  - Consider OT/PT consult to assist with strengthening/mobility  Outcome: Progressing  Goal: Maintain or return to baseline ADL function  Description: INTERVENTIONS:  -  Assess patient's ability to carry out ADLs; assess patient's baseline for ADL function and identify physical deficits which impact ability to perform ADLs (bathing, care of mouth/teeth, toileting, grooming, dressing, etc )  - Assess/evaluate cause of self-care deficits   - Assess range of motion  - Assess patient's mobility; develop plan if impaired  - Assess patient's need for assistive devices and provide as appropriate  - Encourage maximum independence but intervene and supervise when necessary  - Involve family in performance of ADLs  - Assess for home care needs following discharge   - Consider OT consult to assist with ADL evaluation and planning for discharge  - Provide patient education as appropriate  Outcome: Progressing  Goal: Maintain or return mobility status to optimal level  Description: INTERVENTIONS:  - Assess patient's baseline mobility status (ambulation, transfers, stairs, etc )    - Identify cognitive and physical deficits and behaviors that affect mobility  - Identify mobility aids required to assist with transfers and/or ambulation (gait belt, sit-to-stand, lift, walker, cane, etc )  - [place] fall precautions as indicated by assessment  - Record patient progress and toleration of activity level on Mobility SBAR; progress patient to next Phase/Stage  - Instruct patient to call for assistance with activity based on assessment  - Consider rehabilitation consult to assist with strengthening/weightbearing, etc   Outcome: Progressing     Problem: Nutrition/Hydration-ADULT  Goal: Nutrient/Hydration intake appropriate for improving, restoring or maintaining nutritional needs  Description: Monitor and assess patient's nutrition/hydration status for malnutrition  Collaborate with interdisciplinary team and initiate plan and interventions as ordered  Monitor patient's weight and dietary intake as ordered or per policy   Utilize nutrition screening tool and intervene as necessary  Determine patient's food preferences and provide high-protein, high-caloric foods as appropriate  INTERVENTIONS:  - Monitor oral intake, urinary output, labs, and treatment plans  - Assess nutrition and hydration status and recommend course of action  - Evaluate amount of meals eaten  - Assist patient with eating if necessary   - Allow adequate time for meals  - Recommend/ encourage appropriate diets, oral nutritional supplements, and vitamin/mineral supplements  - Order, calculate, and assess calorie counts as needed  - Recommend, monitor, and adjust tube feedings and TPN/PPN based on assessed needs  - Assess need for intravenous fluids  - Provide specific nutrition/hydration education as appropriate  - Include patient/family/caregiver in decisions related to nutrition  Outcome: Progressing     Problem: Potential for Falls  Goal: Patient will remain free of falls  Description: INTERVENTIONS:  - Assess patient frequently for physical needs  -  Identify cognitive and physical deficits and behaviors that affect risk of falls    -  [place] fall precautions as indicated by assessment   - Educate patient/family on patient safety including physical limitations  - Instruct patient to call for assistance with activity based on assessment  - Modify environment to reduce risk of injury  - Consider OT/PT consult to assist with strengthening/mobility  Outcome: Progressing     Problem: SKIN/TISSUE INTEGRITY - ADULT  Goal: Skin integrity remains intact  Description: INTERVENTIONS  - Identify patients at risk for skin breakdown  - Assess and monitor skin integrity  - Assess and monitor nutrition and hydration status  - Monitor labs (i e  albumin)  - Assess for incontinence   - Turn and reposition patient  - Assist with mobility/ambulation  - Relieve pressure over bony prominences  - Avoid friction and shearing  - Provide appropriate hygiene as needed including keeping skin clean and dry  - Evaluate need for skin moisturizer/barrier cream  - Collaborate with interdisciplinary team (i e  Nutrition, Rehabilitation, etc )   - Patient/family teaching  Outcome: Progressing  Goal: Incision(s), wounds(s) or drain site(s) healing without S/S of infection  Description: INTERVENTIONS  - Assess and document risk factors for skin impairment   - Assess and document dressing, incision, wound bed, drain sites and surrounding tissue  - Consider nutrition services referral as needed  - Oral mucous membranes remain intact  - Provide patient/ family education  Outcome: Progressing  Goal: Oral mucous membranes remain intact  Description: INTERVENTIONS  - Assess oral mucosa and hygiene practices  - Implement preventative oral hygiene regimen  - Implement oral medicated treatments as ordered  - Initiate Nutrition services referral as needed  Outcome: Progressing     Problem: HEMATOLOGIC - ADULT  Goal: Maintains hematologic stability  Description: INTERVENTIONS  - Assess for signs and symptoms of bleeding or hemorrhage  - Monitor labs  - Administer supportive blood products/factors as ordered and appropriate  Outcome: Progressing     Problem: MUSCULOSKELETAL - ADULT  Goal: Maintain or return mobility to safest level of function  Description: INTERVENTIONS:  - Assess patient's ability to carry out ADLs; assess patient's baseline for ADL function and identify physical deficits which impact ability to perform ADLs (bathing, care of mouth/teeth, toileting, grooming, dressing, etc )  - Assess/evaluate cause of self-care deficits   - Assess range of motion  - Assess patient's mobility  - Assess patient's need for assistive devices and provide as appropriate  - Encourage maximum independence but intervene and supervise when necessary  - Involve family in performance of ADLs  - Assess for home care needs following discharge   - Consider OT consult to assist with ADL evaluation and planning for discharge  - Provide patient education as appropriate  Outcome: Progressing  Goal: Maintain proper alignment of affected body part  Description: INTERVENTIONS:  - Support, maintain and protect limb and body alignment  - Provide patient/ family with appropriate education  Outcome: Progressing

## 2021-04-26 NOTE — PROGRESS NOTES
[facility]  Progress Note Blanchie Copping [birth year], 39 y o  male MRN: [id number]  Unit/Bed#: [facility] 608-01 Encounter: [id number]  Primary Care Provider: No primary care provider on file     Date and time admitted to hospital: 4/24/2021 10:57 AM    MARILYN (acute kidney injury) ([facility] [address] )  Assessment & Plan  - Cr 1 03 this AM, down from 1 4  - IVF discontinued as patient is tolerating diet and Cr normalized     Elevated troponin  Assessment & Plan  - Peaked at 0 24 overnight   - Likely due to blunt chest wall trauma  - Monitor hemodynamic status, tachycardic initially, currently stable  - Echo 4/25 completed - LV systolic function normal with EF 60%     Closed fracture of left olecranon process  Assessment & Plan  - Associated triceps avulsion  - Seen on XR and MRI 4/24  - S/P open triceps repair 4/25 with Ortho  - NWB LUE in sling and splint     Acute blood loss anemia  Assessment & Plan  - Received 1U PRBCs 4/24  - Trend Hb, hemodynamic status  - Hgb 7 4 this AM but asymptomatic     Open wound of left knee  Assessment & Plan  - 2 lacerations closed 4/24 by ortho   - S/P operative washout 4/25   - WBAT LLE  - F/U with orthopedics for suture removal       Closed displaced segmental fracture of shaft of right femur ([facility] [address] )  Assessment & Plan  - Ortho consulted  - S/P right retrograde femoral IMN, left knee washout, and open left triceps tendon repair 4/25  - NWB LUE in splint and sling, TTWB RLE, WBAT LLE   - DVT ppx for 28 days postop   - Monitor extremity neurovascular exam  - 24hrs post op ancef completed   - Trend Hb, 7 4 today but asymptomatic   - Pain control  - PT/OT    Closed fracture of multiple ribs of left side  Assessment & Plan  - CXR this AM completed   - Rip fx protocol  - IS/pulmonary toileting  - Supplemental O2 as needed to maintain O2 sat > 92%  - PT/OT    Pneumothorax, left  Assessment & Plan  - S/P chest tube 4/24  - Placed to water seal 4/25  - CXR completed this AM   - Will removed L CT later today and recheck CXR   - Supplemental O2 as needed to maintain O2 sat > 92%  - Pain control  - IS          Disposition: Pending post CT removal CXR, PT/OT teresa       SUBJECTIVE:  Chief Complaint: Right thigh pain     Subjective: Overall, doing well this morning  Pain is present but well controlled on current analgesic regimen  Denies chest pain or SOB  OBJECTIVE:     Meds/Allergies     Current Facility-Administered Medications:     acetaminophen (TYLENOL) tablet 975 mg, 975 mg, Oral, Q8H [facility], Gabrielle Saul MD, 975 mg at 04/26/21 3474    bacitracin topical ointment 1 small application, 1 small application, Topical, BID, Nehal Garcia, DO, 1 small application at 05/51/30 0858    docusate sodium (COLACE) capsule 100 mg, 100 mg, Oral, BID, Gabrielle Saul MD, 100 mg at 04/26/21 0858    enoxaparin (LOVENOX) subcutaneous injection 30 mg, 30 mg, Subcutaneous, Q12H [facility], Gabrielle Saul MD, 30 mg at 04/26/21 0858    HYDROmorphone (DILAUDID) injection 0 5 mg, 0 5 mg, Intravenous, Q3H PRN, Gabrielle Saul MD, 0 5 mg at 04/26/21 0900    methocarbamol (ROBAXIN) tablet 500 mg, 500 mg, Oral, Q6H [facility], Gabrielle Sual MD, 500 mg at 04/26/21 0613    nicotine (NICODERM CQ) 7 mg/24hr TD 24 hr patch 1 patch, 1 patch, Transdermal, Daily, Gabrielle Saul MD, 1 patch at 04/24/21 1434    ondansetron (ZOFRAN) injection 4 mg, 4 mg, Intravenous, Q6H PRN, Gabrielle Saul MD    oxyCODONE (ROXICODONE) immediate release tablet 10 mg, 10 mg, Oral, Q4H PRN, Gabrielle Saul MD, 10 mg at 04/26/21 6389    oxyCODONE (ROXICODONE) IR tablet 5 mg, 5 mg, Oral, Q4H PRN, Gabrielle Saul MD    senna (SENOKOT) tablet 17 2 mg, 2 tablet, Oral, Daily, Gabrielle Saul MD, 17 2 mg at 04/26/21 0857     Vitals:   Vitals:    04/26/21 0822   BP:    Pulse:    Resp:    Temp:    SpO2: 97%       Intake/Output:  I/O       04/24 0701 - 04/25 0700 04/25 0701 - 04/26 0700 04/26 0701 - 04/27 0700    P  O   240     I V  (mL/kg)  4062 5 (50 8) 302 1 (3 8)    IV Piggyback 80 930     Total Intake(mL/kg) 80 (1) 5232 5 (65 4) 302 1 (3 8)    Urine (mL/kg/hr) 700 350 (0 2)     Blood  50     Chest Tube 50 90     Total Output 750 490     Net -670 +4742 5 +302 1                  Physical Exam:   Physical Exam  Constitutional:       General: He is not in acute distress  Appearance: Normal appearance  He is not ill-appearing  HENT:      Head: Normocephalic  Right Ear: External ear normal       Left Ear: External ear normal       Nose: Nose normal       Mouth/Throat:      Mouth: Mucous membranes are moist       Pharynx: Oropharynx is clear  Eyes:      Extraocular Movements: Extraocular movements intact  Pupils: Pupils are equal, round, and reactive to light  Neck:      Musculoskeletal: Normal range of motion  Cardiovascular:      Rate and Rhythm: Normal rate and regular rhythm  Pulses: Normal pulses  Heart sounds: Normal heart sounds  Pulmonary:      Effort: Pulmonary effort is normal  No respiratory distress  Breath sounds: Normal breath sounds  Comments: Left chest tube in place and to water seal, dressing CDI, serosanguinous drainage   Abdominal:      General: Abdomen is flat  There is no distension  Palpations: Abdomen is soft  Tenderness: There is no abdominal tenderness  Musculoskeletal:      Comments: LUE dressing CDI, motor and sensory intact, fingers WWP   LLE dressing CDI, motor and sensory intact, toes WWP   RLE dressing CDI, motor and sensory intact, toes WWP    Skin:     General: Skin is warm  Capillary Refill: Capillary refill takes less than 2 seconds  Neurological:      General: No focal deficit present  Mental Status: He is alert  Mental status is at baseline     Psychiatric:         Mood and Affect: Mood normal            Invasive Devices     Peripheral Intravenous Line            Peripheral IV 04/24/21 Right Hand 1 day    Peripheral IV 04/25/21 Right Hand 1 day          Drain Chest Tube 1 Left Fourth intercostal space 24 Fr  1 day    Urethral Catheter Coude 1 day                 Lab Results: Results: I have personally reviewed pertinent reports  Imaging/EKG Studies: Results: I have personally reviewed pertinent reports      Other Studies: None  VTE Prophylaxis: Sequential compression device (Venodyne)  and Enoxaparin (Lovenox)

## 2021-04-26 NOTE — ASSESSMENT & PLAN NOTE
- Ortho consulted  - S/P right retrograde femoral IMN, left knee washout, and open left triceps tendon repair 4/25  - NWB LUE in splint and sling, TTWB RLE, WBAT LLE   - DVT ppx for 28 days postop   - Monitor extremity neurovascular exam  - 24hrs post op ancef completed   - Trend Hb, 7 4 today but asymptomatic   - Pain control  - PT/OT

## 2021-04-26 NOTE — ASSESSMENT & PLAN NOTE
- CXR this AM completed   - Rip fx protocol  - IS/pulmonary toileting  - Supplemental O2 as needed to maintain O2 sat > 92%  - PT/OT

## 2021-04-26 NOTE — ASSESSMENT & PLAN NOTE
- S/P chest tube 4/24  - Placed to water seal 4/25  - CXR completed this AM   - Will removed L CT later today and recheck CXR   - Supplemental O2 as needed to maintain O2 sat > 92%  - Pain control  - IS

## 2021-04-26 NOTE — QUICK NOTE
Left chest tube removed in normal fashion  Occlusive dressing applied  Patient tolerated well  CXR ordered, will follow up once obtained

## 2021-04-27 LAB
ANION GAP SERPL CALCULATED.3IONS-SCNC: 4 MMOL/L (ref 4–13)
BASOPHILS # BLD MANUAL: 0 THOUSAND/UL (ref 0–0.1)
BASOPHILS NFR MAR MANUAL: 0 % (ref 0–1)
BUN SERPL-MCNC: 17 MG/DL (ref 5–25)
CALCIUM SERPL-MCNC: 7.6 MG/DL (ref 8.3–10.1)
CHLORIDE SERPL-SCNC: 108 MMOL/L (ref 100–108)
CO2 SERPL-SCNC: 28 MMOL/L (ref 21–32)
CREAT SERPL-MCNC: 0.98 MG/DL (ref 0.6–1.3)
EOSINOPHIL # BLD MANUAL: 0 THOUSAND/UL (ref 0–0.4)
EOSINOPHIL NFR BLD MANUAL: 0 % (ref 0–6)
ERYTHROCYTE [DISTWIDTH] IN BLOOD BY AUTOMATED COUNT: 12.8 % (ref 11.6–15.1)
GFR SERPL CREATININE-BSD FRML MDRD: 95 ML/MIN/1.73SQ M
GLUCOSE SERPL-MCNC: 116 MG/DL (ref 65–140)
HCT VFR BLD AUTO: 20.4 % (ref 36.5–49.3)
HGB BLD-MCNC: 7.1 G/DL (ref 12–17)
HYPERCHROMIA BLD QL SMEAR: PRESENT
LYMPHOCYTES # BLD AUTO: 2.47 THOUSAND/UL (ref 0.6–4.47)
LYMPHOCYTES # BLD AUTO: 21 % (ref 14–44)
MCH RBC QN AUTO: 31.1 PG (ref 26.8–34.3)
MCHC RBC AUTO-ENTMCNC: 33.8 G/DL (ref 31.4–37.4)
MCV RBC AUTO: 92 FL (ref 82–98)
METAMYELOCYTES NFR BLD MANUAL: 1 % (ref 0–1)
MONOCYTES # BLD AUTO: 0.12 THOUSAND/UL (ref 0–1.22)
MONOCYTES NFR BLD: 1 % (ref 4–12)
NEUTROPHILS # BLD MANUAL: 9.07 THOUSAND/UL (ref 1.85–7.62)
NEUTS SEG NFR BLD AUTO: 77 % (ref 43–75)
NRBC BLD AUTO-RTO: 0 /100 WBCS
PLATELET # BLD AUTO: 183 THOUSANDS/UL (ref 149–390)
PLATELET BLD QL SMEAR: ADEQUATE
PMV BLD AUTO: 9.5 FL (ref 8.9–12.7)
POTASSIUM SERPL-SCNC: 3.7 MMOL/L (ref 3.5–5.3)
RBC # BLD AUTO: 2.22 MILLION/UL (ref 3.88–5.62)
RBC MORPH BLD: PRESENT
SODIUM SERPL-SCNC: 140 MMOL/L (ref 136–145)
WBC # BLD AUTO: 11.78 THOUSAND/UL (ref 4.31–10.16)

## 2021-04-27 PROCEDURE — 85027 COMPLETE CBC AUTOMATED: CPT | Performed by: ORTHOPAEDIC SURGERY

## 2021-04-27 PROCEDURE — NC001 PR NO CHARGE: Performed by: ORTHOPAEDIC SURGERY

## 2021-04-27 PROCEDURE — 99232 SBSQ HOSP IP/OBS MODERATE 35: CPT | Performed by: SURGERY

## 2021-04-27 PROCEDURE — 97535 SELF CARE MNGMENT TRAINING: CPT

## 2021-04-27 PROCEDURE — 85007 BL SMEAR W/DIFF WBC COUNT: CPT | Performed by: ORTHOPAEDIC SURGERY

## 2021-04-27 PROCEDURE — 80048 BASIC METABOLIC PNL TOTAL CA: CPT | Performed by: ORTHOPAEDIC SURGERY

## 2021-04-27 RX ADMIN — ACETAMINOPHEN 975 MG: 325 TABLET ORAL at 21:07

## 2021-04-27 RX ADMIN — METHOCARBAMOL 500 MG: 500 TABLET, FILM COATED ORAL at 05:19

## 2021-04-27 RX ADMIN — DOCUSATE SODIUM 100 MG: 100 CAPSULE, LIQUID FILLED ORAL at 17:38

## 2021-04-27 RX ADMIN — ACETAMINOPHEN 975 MG: 325 TABLET ORAL at 05:19

## 2021-04-27 RX ADMIN — METHOCARBAMOL 500 MG: 500 TABLET, FILM COATED ORAL at 23:38

## 2021-04-27 RX ADMIN — ENOXAPARIN SODIUM 30 MG: 30 INJECTION SUBCUTANEOUS at 08:08

## 2021-04-27 RX ADMIN — SENNOSIDES 17.2 MG: 8.6 TABLET, FILM COATED ORAL at 08:08

## 2021-04-27 RX ADMIN — OXYCODONE HYDROCHLORIDE 10 MG: 10 TABLET ORAL at 05:20

## 2021-04-27 RX ADMIN — OXYCODONE HYDROCHLORIDE 10 MG: 10 TABLET ORAL at 15:12

## 2021-04-27 RX ADMIN — HYDROMORPHONE HYDROCHLORIDE 0.5 MG: 1 INJECTION, SOLUTION INTRAMUSCULAR; INTRAVENOUS; SUBCUTANEOUS at 23:38

## 2021-04-27 RX ADMIN — HYDROMORPHONE HYDROCHLORIDE 0.5 MG: 1 INJECTION, SOLUTION INTRAMUSCULAR; INTRAVENOUS; SUBCUTANEOUS at 08:12

## 2021-04-27 RX ADMIN — HYDROMORPHONE HYDROCHLORIDE 0.5 MG: 1 INJECTION, SOLUTION INTRAMUSCULAR; INTRAVENOUS; SUBCUTANEOUS at 13:29

## 2021-04-27 RX ADMIN — BACITRACIN 1 SMALL APPLICATION: 500 OINTMENT TOPICAL at 08:08

## 2021-04-27 RX ADMIN — OXYCODONE HYDROCHLORIDE 10 MG: 10 TABLET ORAL at 11:11

## 2021-04-27 RX ADMIN — METHOCARBAMOL 500 MG: 500 TABLET, FILM COATED ORAL at 17:38

## 2021-04-27 RX ADMIN — DOCUSATE SODIUM 100 MG: 100 CAPSULE, LIQUID FILLED ORAL at 08:08

## 2021-04-27 RX ADMIN — BACITRACIN 1 SMALL APPLICATION: 500 OINTMENT TOPICAL at 17:38

## 2021-04-27 RX ADMIN — OXYCODONE HYDROCHLORIDE 10 MG: 10 TABLET ORAL at 21:07

## 2021-04-27 RX ADMIN — METHOCARBAMOL 500 MG: 500 TABLET, FILM COATED ORAL at 11:11

## 2021-04-27 RX ADMIN — ENOXAPARIN SODIUM 30 MG: 30 INJECTION SUBCUTANEOUS at 21:07

## 2021-04-27 RX ADMIN — ACETAMINOPHEN 975 MG: 325 TABLET ORAL at 13:33

## 2021-04-27 RX ADMIN — HYDROMORPHONE HYDROCHLORIDE 0.5 MG: 1 INJECTION, SOLUTION INTRAMUSCULAR; INTRAVENOUS; SUBCUTANEOUS at 17:38

## 2021-04-27 NOTE — ASSESSMENT & PLAN NOTE
- Ortho consulted  - S/P right retrograde femoral IMN, left knee washout, and open left triceps tendon repair 4/25  - NWB LUE in splint and sling, TTWB RLE, WBAT LLE   - DVT ppx for 28 days postop   - Monitor extremity neurovascular exam  - 24hrs post op ancef completed   - Trend Hb, 7 1 today but asymptomatic   - Pain control  - PT/OT - inpatient rehab   - CM - patient has no insurance and will not be able to afford inpatient rehab, dispo planning to accommodate medical/PT needs at girlfriends home

## 2021-04-27 NOTE — ASSESSMENT & PLAN NOTE
- Associated triceps avulsion  - Seen on XR and MRI 4/24  - S/P open triceps repair 4/25 with Ortho  - NWB LUE in hinge elbow brace, with extension set to 0 degrees and flexion set to 30 degrees

## 2021-04-27 NOTE — ASSESSMENT & PLAN NOTE
- 2 lacerations closed 4/24 by ortho   - S/P operative washout 4/25   - WBAT LLE  - F/U with orthopedics for suture removal   - PT/OT - inpatient rehab   - CM - patient has no insurance and will not be able to afford inpatient rehab, dispo planning to accommodate medical/PT needs at girlfriends home

## 2021-04-27 NOTE — PROGRESS NOTES
Progress Note - Orthopedics   [address] 39 y o  male MRN: [id number]  [facility]      Subjective:    39 y o male POD 2 right femoral IMN, left knee washout for traumatic arthrotomy, left open triceps repair  No acute events, no complaints  Pt doing well  Pain controlled   Denies fevers chills, CP, SOB    Labs:  0   Lab Value Date/Time    HCT 20 4 (L) 04/27/2021 0512    HCT 22 1 (L) 04/26/2021 0522    HCT 25 1 (L) 04/25/2021 1420    HGB 7 1 (L) 04/27/2021 0512    HGB 7 4 (L) 04/26/2021 0522    HGB 8 5 (L) 04/25/2021 1420    INR 0 92 04/24/2021 1116    WBC 11 78 (H) 04/27/2021 0512    WBC 10 67 (H) 04/26/2021 0522    WBC 8 36 04/25/2021 1420       Meds:    Current Facility-Administered Medications:     acetaminophen (TYLENOL) tablet 975 mg, 975 mg, Oral, Q8H [address], Christelle Moreno MD, 975 mg at 04/27/21 0519    bacitracin topical ointment 1 small application, 1 small application, Topical, BID, Pineda Soler DO, 1 small application at 10/70/54 1737    docusate sodium (COLACE) capsule 100 mg, 100 mg, Oral, BID, Christelle Moreno MD, 100 mg at 04/26/21 1737    enoxaparin (LOVENOX) subcutaneous injection 30 mg, 30 mg, Subcutaneous, Q12H [address], Christelle Moreno MD, 30 mg at 04/26/21 2026    HYDROmorphone (DILAUDID) injection 0 5 mg, 0 5 mg, Intravenous, Q3H PRN, Christelle Moreno MD, 0 5 mg at 04/26/21 1311    methocarbamol (ROBAXIN) tablet 500 mg, 500 mg, Oral, Q6H [address], Christelle Moreno MD, 500 mg at 04/27/21 0519    nicotine (NICODERM CQ) 7 mg/24hr TD 24 hr patch 1 patch, 1 patch, Transdermal, Daily, Christelle Moreno MD, 1 patch at 04/24/21 1434    ondansetron (ZOFRAN) injection 4 mg, 4 mg, Intravenous, Q6H PRN, Christelle Moreno MD    oxyCODONE (ROXICODONE) immediate release tablet 10 mg, 10 mg, Oral, Q4H PRN, Christelle Moreno MD, 10 mg at 04/27/21 0520    oxyCODONE (ROXICODONE) IR tablet 5 mg, 5 mg, Oral, Q4H PRN, Christelle Moreno MD    senna (SENOKOT) tablet 17 2 mg, 2 tablet, Oral, Daily, Christelle Moreno MD, 17 2 mg at 04/26/21 0857    Blood Culture:   No results found for: BLOODCX    Wound Culture:   No results found for: WOUNDCULT    Ins and Outs:  I/O last 24 hours: In: 2349 6 [P O :480; I V :1739 6; IV Piggyback:130]  Out: 1295 [Urine:2400; Chest Tube:50]          Physical:  Vitals:    04/27/21 0251   BP: 113/69   Pulse: 94   Resp: 17   Temp: 99 °F (37 2 °C)   SpO2: 92%     Musculoskeletal: left Upper Extremity  · Dressing c/d/i  · TTP left elbow  · SILT m/r/u    · Motor intact ain/pin/m/r/u  · 2+ radial pulse    Bilateral Lower extremity  · Dressing c/d/i right lower extremity  · Left knee wound clean, dry and intact without erythema or drainage  · TTP bilateral knees  · SILT s/s/sp/dp/t    · +fhl/ehl, +ankle dorsi/plantar flexion  ·  2+ DP pulse    Assessment:    41 y o male POD 2 right femoral IMN, left knee washout for traumatic arthrotomy, left open triceps repair  Patient doing well      Plan:  · TTWB RLE, WBAT LLE in KI, NWB LUE in sling  · Patient has acute blood loss anemia, will monitor and administer IVF/prbc as indicated - Recommend 1 unit pRBC today  · PT/OT  · Pain control  · DVT ppx  · Dispo: Ortho will follow    Bubba Wilcox MD

## 2021-04-27 NOTE — OCCUPATIONAL THERAPY NOTE
Occupational Therapy Treatment Note:       04/27/21 1515   OT Last Visit   OT Visit Date 04/27/21   Note Type   Note Type Treatment   Restrictions/Precautions   LUE Weight Bearing Per Order NWB  (lockable elbow splint)   RLE Weight Bearing Per Order TTWB  (entire leg is ace wrapped)   LLE Weight Bearing Per Order WBAT  (with immobilizer for l knee)   Other Precautions WBS   Pain Assessment   Pain Assessment Tool 0-10   Pain Score 9  (r le)   ADL   Where Assessed Edge of bed   Grooming Assistance 5  Supervision/Setup   UB Bathing Assistance 4  Minimal Assistance   LB Bathing Assistance 2  Maximal Assistance   UB Dressing Assistance 5  Supervision/Setup   Toileting Assistance  2  Maximal Assistance   Toileting Comments clothing management via rolling / bridging   Bed Mobility   Supine to Sit 3  Moderate assistance  (asst for r le and for lifting upper body with hob also elev)   Additional items Assist x 2   Transfers   Sit pivot 2  Maximal assistance  (poor ability to maintain nwbing l le, max vc'x and max asst)   Additional items Assist x 2  (recommend trial of seated slide baord next session)   Cognition   Overall Cognitive Status [facility]   Arousal/Participation Alert   Activity Tolerance   Activity Tolerance Patient limited by pain   Assessment   Assessment pt participated in pm ot session and was seen focusing on adls seated eob, rolling to edis underwear c max asst  pt required max asst x 2 for sit scoot transfers towards right side  pt demonstrates poor ability to maintain nwbing l ue  pt is unable to stand and requried max asst x 2 for this transfer  pt requires to much assist at this time to return home  ff  is 8 steps to enter  pt required max asst to manage underwear over hips and to thread feet into underwear  pts mother was visiting from [place]  and left room for adls and transfer  pt uis motivated and cooperative for oob trial  currently pt requires a hosp bed for supine to sit  will follow     Plan Treatment Interventions ADL retraining;Functional transfer training; Endurance training;Patient/family training;Equipment evaluation/education; Activityengagement   Goal Expiration Date 05/10/21   OT Treatment Day 1   OT Frequency 3-5x/wk   Recommendation   OT Discharge Recommendation Post acute rehabilitation services   OT - OK to Discharge Yes   AM-PAC Daily Activity Inpatient   Lower Body Dressing 2   Bathing 2   Toileting 2   Upper Body Dressing 3   Grooming 3   Eating 4   Daily Activity Raw Score 16   Daily Activity Standardized Score (Calc for Raw Score >=11) 35 96   AM-PAC Applied Cognition Inpatient   Following a Speech/Presentation 3   Understanding Ordinary Conversation 4   Taking Medications 4   Remembering Where Things Are Placed or Put Away 4   Remembering List of 4-5 Errands 4   Taking Care of Complicated Tasks 3   Applied Cognition Raw Score 22   Applied Cognition Standardized Score 47 83   April A ENRIKE Roman

## 2021-04-27 NOTE — PLAN OF CARE
Problem: OCCUPATIONAL THERAPY ADULT  Goal: Performs self-care activities at highest level of function for planned discharge setting  See evaluation for individualized goals  Description: Treatment Interventions: ADL retraining, Functional transfer training, Endurance training, Patient/family training, Equipment evaluation/education, Compensatory technique education, Energy conservation, Activityengagement          See flowsheet documentation for full assessment, interventions and recommendations  Outcome: Progressing  Note: Limitation: Decreased ADL status, Decreased Safe judgement during ADL, Decreased endurance, Decreased self-care trans, Decreased high-level ADLs  Prognosis: Good  Assessment: pt participated in pm ot session and was seen focusing on adls seated eob, rolling to edis underwear c max asst  pt required max asst x 2 for sit scoot transfers towards right side  pt demonstrates poor ability to maintain nwbing l ue  pt is unable to stand and requried max asst x 2 for this transfer  pt requires to much assist at this time to return home  ff  is 8 steps to enter  pt required max asst to manage underwear over hips and to thread feet into underwear  pts mother was visiting from [place]  and left room for adls and transfer  pt uis motivated and cooperative for oob trial  currently pt requires a hosp bed for supine to sit  will follow  OT Discharge Recommendation: Post acute rehabilitation services  OT - OK to Discharge:  Yes     ENRIKE Breen
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
## 2021-04-27 NOTE — PLAN OF CARE
Problem: Prexisting or High Potential for Compromised Skin Integrity  Goal: Skin integrity is maintained or improved  Description: INTERVENTIONS:  - Identify patients at risk for skin breakdown  - Assess and monitor skin integrity  - Assess and monitor nutrition and hydration status  - Monitor labs   - Assess for incontinence   - Turn and reposition patient  - Assist with mobility/ambulation  - Relieve pressure over bony prominences  - Avoid friction and shearing  - Provide appropriate hygiene as needed including keeping skin clean and dry  - Evaluate need for skin moisturizer/barrier cream  - Collaborate with interdisciplinary team   - Patient/family teaching  - Consider wound care consult   Outcome: Progressing     Problem: PAIN - ADULT  Goal: Verbalizes/displays adequate comfort level or baseline comfort level  Description: Interventions:  - Encourage patient to monitor pain and request assistance  - Assess pain using appropriate pain scale  - Administer analgesics based on type and severity of pain and evaluate response  - Implement non-pharmacological measures as appropriate and evaluate response  - Consider cultural and social influences on pain and pain management  - Notify physician/advanced practitioner if interventions unsuccessful or patient reports new pain  Outcome: Progressing     Problem: SAFETY ADULT  Goal: Patient will remain free of falls  Description: INTERVENTIONS:  - Assess patient frequently for physical needs  -  Identify cognitive and physical deficits and behaviors that affect risk of falls    -  [place] fall precautions as indicated by assessment   - Educate patient/family on patient safety including physical limitations  - Instruct patient to call for assistance with activity based on assessment  - Modify environment to reduce risk of injury  - Consider OT/PT consult to assist with strengthening/mobility  Outcome: Progressing  Goal: Maintain or return to baseline ADL function  Description: INTERVENTIONS:  -  Assess patient's ability to carry out ADLs; assess patient's baseline for ADL function and identify physical deficits which impact ability to perform ADLs (bathing, care of mouth/teeth, toileting, grooming, dressing, etc )  - Assess/evaluate cause of self-care deficits   - Assess range of motion  - Assess patient's mobility; develop plan if impaired  - Assess patient's need for assistive devices and provide as appropriate  - Encourage maximum independence but intervene and supervise when necessary  - Involve family in performance of ADLs  - Assess for home care needs following discharge   - Consider OT consult to assist with ADL evaluation and planning for discharge  - Provide patient education as appropriate  Outcome: Progressing  Goal: Maintain or return mobility status to optimal level  Description: INTERVENTIONS:  - Assess patient's baseline mobility status (ambulation, transfers, stairs, etc )    - Identify cognitive and physical deficits and behaviors that affect mobility  - Identify mobility aids required to assist with transfers and/or ambulation (gait belt, sit-to-stand, lift, walker, cane, etc )  - [place] fall precautions as indicated by assessment  - Record patient progress and toleration of activity level on Mobility SBAR; progress patient to next Phase/Stage  - Instruct patient to call for assistance with activity based on assessment  - Consider rehabilitation consult to assist with strengthening/weightbearing, etc   Outcome: Progressing     Problem: Potential for Falls  Goal: Patient will remain free of falls  Description: INTERVENTIONS:  - Assess patient frequently for physical needs  -  Identify cognitive and physical deficits and behaviors that affect risk of falls    -  [place] fall precautions as indicated by assessment   - Educate patient/family on patient safety including physical limitations  - Instruct patient to call for assistance with activity based on assessment  - Modify environment to reduce risk of injury  - Consider OT/PT consult to assist with strengthening/mobility  Outcome: Progressing     Problem: SKIN/TISSUE INTEGRITY - ADULT  Goal: Skin integrity remains intact  Description: INTERVENTIONS  - Identify patients at risk for skin breakdown  - Assess and monitor skin integrity  - Assess and monitor nutrition and hydration status  - Monitor labs (i e  albumin)  - Assess for incontinence   - Turn and reposition patient  - Assist with mobility/ambulation  - Relieve pressure over bony prominences  - Avoid friction and shearing  - Provide appropriate hygiene as needed including keeping skin clean and dry  - Evaluate need for skin moisturizer/barrier cream  - Collaborate with interdisciplinary team (i e  Nutrition, Rehabilitation, etc )   - Patient/family teaching  Outcome: Progressing  Goal: Incision(s), wounds(s) or drain site(s) healing without S/S of infection  Description: INTERVENTIONS  - Assess and document risk factors for skin impairment   - Assess and document dressing, incision, wound bed, drain sites and surrounding tissue  - Consider nutrition services referral as needed  - Oral mucous membranes remain intact  - Provide patient/ family education  Outcome: Progressing  Goal: Oral mucous membranes remain intact  Description: INTERVENTIONS  - Assess oral mucosa and hygiene practices  - Implement preventative oral hygiene regimen  - Implement oral medicated treatments as ordered  - Initiate Nutrition services referral as needed  Outcome: Progressing     Problem: HEMATOLOGIC - ADULT  Goal: Maintains hematologic stability  Description: INTERVENTIONS  - Assess for signs and symptoms of bleeding or hemorrhage  - Monitor labs  - Administer supportive blood products/factors as ordered and appropriate  Outcome: Progressing     Problem: MUSCULOSKELETAL - ADULT  Goal: Maintain or return mobility to safest level of function  Description: INTERVENTIONS:  - Assess patient's ability to carry out ADLs; assess patient's baseline for ADL function and identify physical deficits which impact ability to perform ADLs (bathing, care of mouth/teeth, toileting, grooming, dressing, etc )  - Assess/evaluate cause of self-care deficits   - Assess range of motion  - Assess patient's mobility  - Assess patient's need for assistive devices and provide as appropriate  - Encourage maximum independence but intervene and supervise when necessary  - Involve family in performance of ADLs  - Assess for home care needs following discharge   - Consider OT consult to assist with ADL evaluation and planning for discharge  - Provide patient education as appropriate  Outcome: Progressing  Goal: Maintain proper alignment of affected body part  Description: INTERVENTIONS:  - Support, maintain and protect limb and body alignment  - Provide patient/ family with appropriate education  Outcome: Progressing

## 2021-04-27 NOTE — ASSESSMENT & PLAN NOTE
- S/P chest tube 4/24  - Placed to water seal 4/25  - L CT removed 4/26 with stable post pull CXR   - Supplemental O2 as needed to maintain O2 sat > 92%  - Pain control  - IS

## 2021-04-27 NOTE — ASSESSMENT & PLAN NOTE
- CXR this AM completed   - Rip fx protocol  - IS/pulmonary toileting  - Supplemental O2 as needed to maintain O2 sat > 92%  - PT/OT - inpatient rehab   - CM - patient has no insurance and will not be able to afford inpatient rehab, dispo planning to accommodate medical/PT needs at girlfriends home

## 2021-04-28 PROBLEM — J93.9 PNEUMOTHORAX, LEFT: Status: RESOLVED | Noted: 2021-04-24 | Resolved: 2021-04-28

## 2021-04-28 PROBLEM — R77.8 ELEVATED TROPONIN: Status: RESOLVED | Noted: 2021-04-25 | Resolved: 2021-04-28

## 2021-04-28 PROBLEM — R79.89 ELEVATED TROPONIN: Status: RESOLVED | Noted: 2021-04-25 | Resolved: 2021-04-28

## 2021-04-28 PROBLEM — N17.9 AKI (ACUTE KIDNEY INJURY) (HCC): Status: RESOLVED | Noted: 2021-04-25 | Resolved: 2021-04-28

## 2021-04-28 LAB
ERYTHROCYTE [DISTWIDTH] IN BLOOD BY AUTOMATED COUNT: 13.1 % (ref 11.6–15.1)
HCT VFR BLD AUTO: 23.2 % (ref 36.5–49.3)
HGB BLD-MCNC: 7.6 G/DL (ref 12–17)
MCH RBC QN AUTO: 30.6 PG (ref 26.8–34.3)
MCHC RBC AUTO-ENTMCNC: 32.8 G/DL (ref 31.4–37.4)
MCV RBC AUTO: 94 FL (ref 82–98)
NRBC BLD AUTO-RTO: 0 /100 WBCS
PLATELET # BLD AUTO: 278 THOUSANDS/UL (ref 149–390)
PMV BLD AUTO: 9 FL (ref 8.9–12.7)
RBC # BLD AUTO: 2.48 MILLION/UL (ref 3.88–5.62)
WBC # BLD AUTO: 12.69 THOUSAND/UL (ref 4.31–10.16)

## 2021-04-28 PROCEDURE — 97535 SELF CARE MNGMENT TRAINING: CPT

## 2021-04-28 PROCEDURE — 97530 THERAPEUTIC ACTIVITIES: CPT

## 2021-04-28 PROCEDURE — 99232 SBSQ HOSP IP/OBS MODERATE 35: CPT | Performed by: EMERGENCY MEDICINE

## 2021-04-28 PROCEDURE — NC001 PR NO CHARGE: Performed by: ORTHOPAEDIC SURGERY

## 2021-04-28 PROCEDURE — 85027 COMPLETE CBC AUTOMATED: CPT | Performed by: STUDENT IN AN ORGANIZED HEALTH CARE EDUCATION/TRAINING PROGRAM

## 2021-04-28 RX ORDER — OXYCODONE HYDROCHLORIDE 5 MG/1
TABLET ORAL
Qty: 45 TABLET | Refills: 0 | Status: SHIPPED | OUTPATIENT
Start: 2021-04-28 | End: 2021-05-01

## 2021-04-28 RX ORDER — ASPIRIN 325 MG
325 TABLET, DELAYED RELEASE (ENTERIC COATED) ORAL 2 TIMES DAILY
Qty: 28 TABLET | Refills: 0 | Status: CANCELLED
Start: 2021-04-28

## 2021-04-28 RX ADMIN — HYDROMORPHONE HYDROCHLORIDE 0.5 MG: 1 INJECTION, SOLUTION INTRAMUSCULAR; INTRAVENOUS; SUBCUTANEOUS at 05:22

## 2021-04-28 RX ADMIN — SENNOSIDES 17.2 MG: 8.6 TABLET, FILM COATED ORAL at 09:02

## 2021-04-28 RX ADMIN — METHOCARBAMOL 500 MG: 500 TABLET, FILM COATED ORAL at 11:15

## 2021-04-28 RX ADMIN — DOCUSATE SODIUM 100 MG: 100 CAPSULE, LIQUID FILLED ORAL at 17:20

## 2021-04-28 RX ADMIN — OXYCODONE HYDROCHLORIDE 10 MG: 10 TABLET ORAL at 09:02

## 2021-04-28 RX ADMIN — OXYCODONE HYDROCHLORIDE 10 MG: 10 TABLET ORAL at 17:20

## 2021-04-28 RX ADMIN — OXYCODONE HYDROCHLORIDE 10 MG: 10 TABLET ORAL at 21:50

## 2021-04-28 RX ADMIN — ENOXAPARIN SODIUM 30 MG: 30 INJECTION SUBCUTANEOUS at 21:50

## 2021-04-28 RX ADMIN — ACETAMINOPHEN 975 MG: 325 TABLET ORAL at 21:50

## 2021-04-28 RX ADMIN — OXYCODONE HYDROCHLORIDE 10 MG: 10 TABLET ORAL at 13:17

## 2021-04-28 RX ADMIN — METHOCARBAMOL 500 MG: 500 TABLET, FILM COATED ORAL at 23:39

## 2021-04-28 RX ADMIN — ACETAMINOPHEN 975 MG: 325 TABLET ORAL at 05:22

## 2021-04-28 RX ADMIN — METHOCARBAMOL 500 MG: 500 TABLET, FILM COATED ORAL at 05:22

## 2021-04-28 RX ADMIN — ACETAMINOPHEN 975 MG: 325 TABLET ORAL at 13:17

## 2021-04-28 RX ADMIN — OXYCODONE HYDROCHLORIDE 10 MG: 10 TABLET ORAL at 03:41

## 2021-04-28 RX ADMIN — METHOCARBAMOL 500 MG: 500 TABLET, FILM COATED ORAL at 17:20

## 2021-04-28 RX ADMIN — BACITRACIN 1 SMALL APPLICATION: 500 OINTMENT TOPICAL at 09:02

## 2021-04-28 RX ADMIN — ENOXAPARIN SODIUM 30 MG: 30 INJECTION SUBCUTANEOUS at 09:02

## 2021-04-28 RX ADMIN — BACITRACIN 1 SMALL APPLICATION: 500 OINTMENT TOPICAL at 17:20

## 2021-04-28 RX ADMIN — DOCUSATE SODIUM 100 MG: 100 CAPSULE, LIQUID FILLED ORAL at 09:02
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
## 2021-04-28 NOTE — ASSESSMENT & PLAN NOTE
- 2 lacerations closed 4/24 by ortho   - S/P operative washout 4/25   - WBAT LLE  - F/U with orthopedics for suture removal   - PT/OT - inpatient rehab   - CM - patient has no insurance and will not be able to afford inpatient rehab, dispo planning to accommodate medical/PT needs at girlfriends home; patient is agreeable to dispo plan

## 2021-04-28 NOTE — PHYSICAL THERAPY NOTE
Physical Therapy Progress Note     04/28/21 1445   PT Last Visit   PT Visit Date 04/28/21   Note Type   Note Type Treatment   Pain Assessment   Pain Assessment Tool Pain Assessment not indicated - pt denies pain   Pain Score Worst Possible Pain   Pain Location/Orientation Orientation: Bilateral;Location: Leg   Hospital Pain Intervention(s) Repositioned;Cold applied; Ambulation/increased activity; Elevated; Rest   Restrictions/Precautions   LUE Weight Bearing Per Order NWB   RLE Weight Bearing Per Order TTWB   LLE Weight Bearing Per Order WBAT   Braces or Orthoses LE Immobilizer  (LLE immobilizer, LUE hinged brace)   Other Precautions WBS;Pain; Fall Risk   Subjective   Subjective Pt encountered supie in bed with SO & mother present  Reports having taken pain meds recently, but still has significant pain with activity  Family eager to learn how to assist him with transfers, but are concerned regarding ability to manage if he is to d/c tomorrow  Bed Mobility   Supine to Sit 3  Moderate assistance   Additional items Assist x 2   Sit to Supine 2  Maximal assistance   Additional items Assist x 2   Transfers   Sit to Stand 2  Maximal assistance  (mod x2 towards L side)   Additional items Assist x 2   Balance   Static Sitting Fair   Dynamic Sitting Poor   Endurance Deficit   Endurance Deficit Yes   Endurance Deficit Description pain   Activity Tolerance   Activity Tolerance Patient limited by fatigue;Patient limited by pain   Nurse Made Aware yes   Assessment   Prognosis Good   Problem List Decreased strength;Decreased range of motion;Decreased endurance; Impaired balance;Decreased mobility;Pain;Orthopedic restrictions   Assessment Pt demosntrated improved abiilty to participate & initiate transfers, but requires Ax2-3 for all tasks at this time due to pain, fatigue, & ortho restrictions given extensive injuries    Pt performed slide board transfers better towards L side with improved RUE assist   Was noncompliant with LUE throughout, requiring frequent instructions to keep LUE on lap  Family given instructions throughout regarding sequencing of transfers as well as wheelchair placement & safety  Pt & family will require additional training wtih family participation in pt mobility to maximize safety with tasks upon discharge  Recommendation for rehab remains appropriate  spoke with CM regarding need for further practice & famly training if pt is to discharge home safely as well as discussed requisite equipment  Barriers to Discharge Inaccessible home environment;Decreased caregiver support   Goals   Patient Goals to have less pain, & be able to go home safely   STG Expiration Date 05/06/21   PT Treatment Day 1   Plan   Treatment/Interventions Functional transfer training;LE strengthening/ROM; Therapeutic exercise; Endurance training;Patient/family training;Bed mobility; Equipment eval/education   Progress Progressing toward goals   PT Frequency Other (Comment)  (3-6x/week)   Recommendation   PT Discharge Recommendation Post acute rehabilitation services   Equipment Recommended Wheelchair  (hospital bed, BSC, slideboard)   Wheelchair Package Recommended Standard  (adjustable leg rests)   AM-PAC Basic Mobility Inpatient   Turning in Bed Without Bedrails 2   Lying on Back to Sitting on Edge of Flat Bed 1   Moving Bed to Chair 1   Standing Up From Chair 1   Walk in Room 1   Climb 3-5 Stairs 1   Basic Mobility Inpatient Raw Score 7   Turning Head Towards Sound 4   Follow Simple Instructions 4   Low Function Basic Mobility Raw Score 15   Low Function Basic Mobility Standardized Score 23 9   The patient's AM-PAC Basic Mobility Inpatient Short Form Low Function Raw Score 15 , Standardized Score is 23 9  A standardized score less 42 9 suggests the patient may benefit from discharge to post-acute rehab services  Please also refer to the recommendation of the Physical Therapist for safe discharge planning        Disha Ponce PTA

## 2021-04-28 NOTE — ASSESSMENT & PLAN NOTE
- Ortho consulted  - S/P right retrograde femoral IMN, left knee washout, and open left triceps tendon repair 4/25  - NWB LUE in splint and sling, TTWB RLE, WBAT LLE   - DVT ppx for 28 days postop   - Monitor extremity neurovascular exam  - 24hrs post op ancef completed   - Trend Hb   - Pain control  - PT/OT - inpatient rehab   - CM - patient has no insurance and will not be able to afford inpatient rehab, dispo planning to accommodate medical/PT needs at girlfriends home; patient is agreeable to dispo plan

## 2021-04-28 NOTE — ASSESSMENT & PLAN NOTE
- CXR this AM completed   - Rip fx protocol  - IS/pulmonary toileting  - Supplemental O2 as needed to maintain O2 sat > 92%  - PT/OT - inpatient rehab   - CM - patient has no insurance and will not be able to afford inpatient rehab, dispo planning to accommodate medical/PT needs at girlfriends home; patient is agreeable to dispo plan

## 2021-04-28 NOTE — PLAN OF CARE
Problem: Prexisting or High Potential for Compromised Skin Integrity  Goal: Skin integrity is maintained or improved  Description: INTERVENTIONS:  - Identify patients at risk for skin breakdown  - Assess and monitor skin integrity  - Assess and monitor nutrition and hydration status  - Monitor labs   - Assess for incontinence   - Turn and reposition patient  - Assist with mobility/ambulation  - Relieve pressure over bony prominences  - Avoid friction and shearing  - Provide appropriate hygiene as needed including keeping skin clean and dry  - Evaluate need for skin moisturizer/barrier cream  - Collaborate with interdisciplinary team   - Patient/family teaching  - Consider wound care consult   Outcome: Progressing     Problem: PAIN - ADULT  Goal: Verbalizes/displays adequate comfort level or baseline comfort level  Description: Interventions:  - Encourage patient to monitor pain and request assistance  - Assess pain using appropriate pain scale  - Administer analgesics based on type and severity of pain and evaluate response  - Implement non-pharmacological measures as appropriate and evaluate response  - Consider cultural and social influences on pain and pain management  - Notify physician/advanced practitioner if interventions unsuccessful or patient reports new pain  Outcome: Progressing     Problem: INFECTION - ADULT  Goal: Absence or prevention of progression during hospitalization  Description: INTERVENTIONS:  - Assess and monitor for signs and symptoms of infection  - Monitor lab/diagnostic results  - Monitor all insertion sites, i e  indwelling lines, tubes, and drains  - Monitor endotracheal if appropriate and nasal secretions for changes in amount and color  - [place] appropriate cooling/warming therapies per order  - Administer medications as ordered  - Instruct and encourage patient and family to use good hand hygiene technique  - Identify and instruct in appropriate isolation precautions for identified infection/condition  Outcome: Progressing     Problem: SAFETY ADULT  Goal: Patient will remain free of falls  Description: INTERVENTIONS:  - Assess patient frequently for physical needs  -  Identify cognitive and physical deficits and behaviors that affect risk of falls    -  [place] fall precautions as indicated by assessment   - Educate patient/family on patient safety including physical limitations  - Instruct patient to call for assistance with activity based on assessment  - Modify environment to reduce risk of injury  - Consider OT/PT consult to assist with strengthening/mobility  Outcome: Progressing  Goal: Maintain or return to baseline ADL function  Description: INTERVENTIONS:  -  Assess patient's ability to carry out ADLs; assess patient's baseline for ADL function and identify physical deficits which impact ability to perform ADLs (bathing, care of mouth/teeth, toileting, grooming, dressing, etc )  - Assess/evaluate cause of self-care deficits   - Assess range of motion  - Assess patient's mobility; develop plan if impaired  - Assess patient's need for assistive devices and provide as appropriate  - Encourage maximum independence but intervene and supervise when necessary  - Involve family in performance of ADLs  - Assess for home care needs following discharge   - Consider OT consult to assist with ADL evaluation and planning for discharge  - Provide patient education as appropriate  Outcome: Progressing  Goal: Maintain or return mobility status to optimal level  Description: INTERVENTIONS:  - Assess patient's baseline mobility status (ambulation, transfers, stairs, etc )    - Identify cognitive and physical deficits and behaviors that affect mobility  - Identify mobility aids required to assist with transfers and/or ambulation (gait belt, sit-to-stand, lift, walker, cane, etc )  - [place] fall precautions as indicated by assessment  - Record patient progress and toleration of activity level on Mobility SBAR; progress patient to next Phase/Stage  - Instruct patient to call for assistance with activity based on assessment  - Consider rehabilitation consult to assist with strengthening/weightbearing, etc   Outcome: Progressing     Problem: DISCHARGE PLANNING  Goal: Discharge to home or other facility with appropriate resources  Description: INTERVENTIONS:  - Identify barriers to discharge w/patient and caregiver  - Arrange for needed discharge resources and transportation as appropriate  - Identify discharge learning needs (meds, wound care, etc )  - Arrange for interpretive services to assist at discharge as needed  - Refer to Case Management Department for coordinating discharge planning if the patient needs post-hospital services based on physician/advanced practitioner order or complex needs related to functional status, cognitive ability, or social support system  Outcome: Progressing     Problem: Knowledge Deficit  Goal: Patient/family/caregiver demonstrates understanding of disease process, treatment plan, medications, and discharge instructions  Description: Complete learning assessment and assess knowledge base  Interventions:  - Provide teaching at level of understanding  - Provide teaching via preferred learning methods  Outcome: Progressing     Problem: Nutrition/Hydration-ADULT  Goal: Nutrient/Hydration intake appropriate for improving, restoring or maintaining nutritional needs  Description: Monitor and assess patient's nutrition/hydration status for malnutrition  Collaborate with interdisciplinary team and initiate plan and interventions as ordered  Monitor patient's weight and dietary intake as ordered or per policy  Utilize nutrition screening tool and intervene as necessary  Determine patient's food preferences and provide high-protein, high-caloric foods as appropriate       INTERVENTIONS:  - Monitor oral intake, urinary output, labs, and treatment plans  - Assess nutrition and hydration status and recommend course of action  - Evaluate amount of meals eaten  - Assist patient with eating if necessary   - Allow adequate time for meals  - Recommend/ encourage appropriate diets, oral nutritional supplements, and vitamin/mineral supplements  - Order, calculate, and assess calorie counts as needed  - Recommend, monitor, and adjust tube feedings and TPN/PPN based on assessed needs  - Assess need for intravenous fluids  - Provide specific nutrition/hydration education as appropriate  - Include patient/family/caregiver in decisions related to nutrition  Outcome: Progressing     Problem: Potential for Falls  Goal: Patient will remain free of falls  Description: INTERVENTIONS:  - Assess patient frequently for physical needs  -  Identify cognitive and physical deficits and behaviors that affect risk of falls    -  [place] fall precautions as indicated by assessment   - Educate patient/family on patient safety including physical limitations  - Instruct patient to call for assistance with activity based on assessment  - Modify environment to reduce risk of injury  - Consider OT/PT consult to assist with strengthening/mobility  Outcome: Progressing     Problem: SKIN/TISSUE INTEGRITY - ADULT  Goal: Skin integrity remains intact  Description: INTERVENTIONS  - Identify patients at risk for skin breakdown  - Assess and monitor skin integrity  - Assess and monitor nutrition and hydration status  - Monitor labs (i e  albumin)  - Assess for incontinence   - Turn and reposition patient  - Assist with mobility/ambulation  - Relieve pressure over bony prominences  - Avoid friction and shearing  - Provide appropriate hygiene as needed including keeping skin clean and dry  - Evaluate need for skin moisturizer/barrier cream  - Collaborate with interdisciplinary team (i e  Nutrition, Rehabilitation, etc )   - Patient/family teaching  Outcome: Progressing  Goal: Incision(s), wounds(s) or drain site(s) healing without S/S of infection  Description: INTERVENTIONS  - Assess and document risk factors for skin impairment   - Assess and document dressing, incision, wound bed, drain sites and surrounding tissue  - Consider nutrition services referral as needed  - Oral mucous membranes remain intact  - Provide patient/ family education  Outcome: Progressing  Goal: Oral mucous membranes remain intact  Description: INTERVENTIONS  - Assess oral mucosa and hygiene practices  - Implement preventative oral hygiene regimen  - Implement oral medicated treatments as ordered  - Initiate Nutrition services referral as needed  Outcome: Progressing     Problem: HEMATOLOGIC - ADULT  Goal: Maintains hematologic stability  Description: INTERVENTIONS  - Assess for signs and symptoms of bleeding or hemorrhage  - Monitor labs  - Administer supportive blood products/factors as ordered and appropriate  Outcome: Progressing     Problem: MUSCULOSKELETAL - ADULT  Goal: Maintain or return mobility to safest level of function  Description: INTERVENTIONS:  - Assess patient's ability to carry out ADLs; assess patient's baseline for ADL function and identify physical deficits which impact ability to perform ADLs (bathing, care of mouth/teeth, toileting, grooming, dressing, etc )  - Assess/evaluate cause of self-care deficits   - Assess range of motion  - Assess patient's mobility  - Assess patient's need for assistive devices and provide as appropriate  - Encourage maximum independence but intervene and supervise when necessary  - Involve family in performance of ADLs  - Assess for home care needs following discharge   - Consider OT consult to assist with ADL evaluation and planning for discharge  - Provide patient education as appropriate  Outcome: Progressing     Problem: MUSCULOSKELETAL - ADULT  Goal: Maintain or return mobility to safest level of function  Description: INTERVENTIONS:  - Assess patient's ability to carry out ADLs; assess patient's baseline for ADL function and identify physical deficits which impact ability to perform ADLs (bathing, care of mouth/teeth, toileting, grooming, dressing, etc )  - Assess/evaluate cause of self-care deficits   - Assess range of motion  - Assess patient's mobility  - Assess patient's need for assistive devices and provide as appropriate  - Encourage maximum independence but intervene and supervise when necessary  - Involve family in performance of ADLs  - Assess for home care needs following discharge   - Consider OT consult to assist with ADL evaluation and planning for discharge  - Provide patient education as appropriate  Outcome: Progressing  Goal: Maintain proper alignment of affected body part  Description: INTERVENTIONS:  - Support, maintain and protect limb and body alignment  - Provide patient/ family with appropriate education  Outcome: Progressing     Problem: MUSCULOSKELETAL - ADULT  Goal: Maintain proper alignment of affected body part  Description: INTERVENTIONS:  - Support, maintain and protect limb and body alignment  - Provide patient/ family with appropriate education  Outcome: Progressing
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
## 2021-04-28 NOTE — PLAN OF CARE
Problem: PHYSICAL THERAPY ADULT  Goal: Performs mobility at highest level of function for planned discharge setting  See evaluation for individualized goals  Description: Treatment/Interventions: Functional transfer training, LE strengthening/ROM, Therapeutic exercise, Endurance training, Patient/family training, Equipment eval/education, Bed mobility, Continued evaluation, Spoke to nursing, OT  Equipment Recommended: Wheelchair       See flowsheet documentation for full assessment, interventions and recommendations  Outcome: Progressing  Note: Prognosis: Good  Problem List: Decreased strength, Decreased range of motion, Decreased endurance, Impaired balance, Decreased mobility, Pain, Orthopedic restrictions  Assessment: Pt demosntrated improved abiilty to participate & initiate transfers, but requires Ax2-3 for all tasks at this time due to pain, fatigue, & ortho restrictions given extensive injuries  Pt performed slide board transfers better towards L side with improved RUE assist   Was noncompliant with LUE throughout, requiring frequent instructions to keep LUE on lap  Family given instructions throughout regarding sequencing of transfers as well as wheelchair placement & safety  Pt & family will require additional training wtih family participation in pt mobility to maximize safety with tasks upon discharge  Recommendation for rehab remains appropriate  spoke with CM regarding need for further practice & famly training if pt is to discharge home safely as well as discussed requisite equipment  Barriers to Discharge: Inaccessible home environment, Decreased caregiver support        PT Discharge Recommendation: Post acute rehabilitation services     PT - OK to Discharge: Yes(To rehab when medically cleared)    See flowsheet documentation for full assessment

## 2021-04-28 NOTE — OCCUPATIONAL THERAPY NOTE
Occupational Therapy Treatment Note:       04/28/21 1455   OT Last Visit   OT Visit Date 04/28/21   Note Type   Note Type Treatment   Restrictions/Precautions   LUE Weight Bearing Per Order NWB   RLE Weight Bearing Per Order TTWB   LLE Weight Bearing Per Order WBAT   Braces or Orthoses   (le immobilizer l le, wbat)   Other Precautions WBS; Fall Risk;Pain   Pain Assessment   Pain Assessment Tool Pain Assessment not indicated - pt denies pain   Pain Score Worst Possible Pain   ADL   Where Assessed Edge of bed  (pts family was educated on techniques for lb dressing)   Bed Mobility   Supine to Sit 3  Moderate assistance   Additional items   (hob elevated)   Transfers   Sliding Board transfer 2  Maximal assistance   Additional items   (asst of third for le support)   Cognition   Overall Cognitive Status WFL   Activity Tolerance   Activity Tolerance Patient limited by pain   Assessment   Assessment pt participated in pm ot session and was seen focusing on family trainning including slide board transfer, w/c education, bed mobility education  pt currently requires asst x 2 to 3 and is minimally noncompliant with l ue nwbing  pts mother and wife wish to have hands on trainning when appropraite  this session the observed 2 slide board transfers to from w/c and hosp bed this was pts first time performing slide board transfer  pt was c/o feeling faint after sitting oob for 15 min after transfers  b/p was wfl  Cymraes english transulator was present rehab aide  pt requires min asst ub adls and max asst lb and family was instructed in need to perform supine for lb  pt will require a w/c with elevating leg rests and swing away arms, drop arm commode, slide board, and hospital bed  plan to continue to perofrm family trainning and practice with slide baord transfer as toelrated s   Plan   Treatment Interventions ADL retraining;Functional transfer training; Endurance training;Patient/family training;Equipment evaluation/education; Activityengagement   Goal Expiration Date 05/10/21   OT Treatment Day 2   OT Frequency 3-5x/wk   Recommendation   OT Discharge Recommendation Post acute rehabilitation services   OT - OK to Discharge Yes   ENRIKE Breen
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
## 2021-04-28 NOTE — PLAN OF CARE
Problem: OCCUPATIONAL THERAPY ADULT  Goal: Performs self-care activities at highest level of function for planned discharge setting  See evaluation for individualized goals  Description: Treatment Interventions: ADL retraining, Functional transfer training, Endurance training, Patient/family training, Equipment evaluation/education, Compensatory technique education, Energy conservation, Activityengagement          See flowsheet documentation for full assessment, interventions and recommendations  Outcome: Progressing  Note: Limitation: Decreased ADL status, Decreased Safe judgement during ADL, Decreased endurance, Decreased self-care trans, Decreased high-level ADLs  Prognosis: Good  Assessment: pt participated in pm ot session and was seen focusing on family trainning including slide board transfer, w/c education, bed mobility education  pt currently requires asst x 2 to 3 and is minimally noncompliant with l ue nwbing  pts mother and wife wish to have hands on trainning when appropraite  this session the observed 2 slide board transfers to from w/c and hosp bed this was pts first time performing slide board transfer  pt was c/o feeling faint after sitting oob for 15 min after transfers  b/p was wfl  Thai english transulator was present rehab aide  pt requires min asst ub adls and max asst lb and family was instructed in need to perform supine for lb  pt will require a w/c with elevating leg rests and swing away arms, drop arm commode, slide board, and hospital bed  plan to continue to perofrm family trainning and practice with slide baord transfer as toelrated s     OT Discharge Recommendation: Post acute rehabilitation services  OT - OK to Discharge:  Yes    April A ENRIKE Roman

## 2021-04-28 NOTE — CASE MANAGEMENT
Cm spoke to pt's aunt Crystal Trammell [phone]  CM explained that pt is being d/c home  Lisa Bonilla explains the family has a wheelchair, walker and BSC at home  Family isn't interested in VNA as the pt doesn't have medical insurance  Pt's medications were sent to pharmacy and family knows they must pay for them OOP  Pt's g/f will transport home  No other needs at this time

## 2021-04-29 ENCOUNTER — APPOINTMENT (INPATIENT)
Dept: RADIOLOGY | Facility: HOSPITAL | Age: 41
DRG: 956 | End: 2021-04-29
Payer: COMMERCIAL

## 2021-04-29 LAB
ERYTHROCYTE [DISTWIDTH] IN BLOOD BY AUTOMATED COUNT: 13.2 % (ref 11.6–15.1)
HCT VFR BLD AUTO: 21.7 % (ref 36.5–49.3)
HGB BLD-MCNC: 7.2 G/DL (ref 12–17)
MCH RBC QN AUTO: 31.3 PG (ref 26.8–34.3)
MCHC RBC AUTO-ENTMCNC: 33.2 G/DL (ref 31.4–37.4)
MCV RBC AUTO: 94 FL (ref 82–98)
NRBC BLD AUTO-RTO: 1 /100 WBCS
PLATELET # BLD AUTO: 293 THOUSANDS/UL (ref 149–390)
PMV BLD AUTO: 8.9 FL (ref 8.9–12.7)
RBC # BLD AUTO: 2.3 MILLION/UL (ref 3.88–5.62)
WBC # BLD AUTO: 13.99 THOUSAND/UL (ref 4.31–10.16)

## 2021-04-29 PROCEDURE — 97535 SELF CARE MNGMENT TRAINING: CPT

## 2021-04-29 PROCEDURE — 71045 X-RAY EXAM CHEST 1 VIEW: CPT

## 2021-04-29 PROCEDURE — 99232 SBSQ HOSP IP/OBS MODERATE 35: CPT | Performed by: SURGERY

## 2021-04-29 PROCEDURE — 97530 THERAPEUTIC ACTIVITIES: CPT

## 2021-04-29 PROCEDURE — 85027 COMPLETE CBC AUTOMATED: CPT | Performed by: ORTHOPAEDIC SURGERY

## 2021-04-29 RX ORDER — ACETAMINOPHEN 325 MG/1
325 TABLET ORAL ONCE
Status: COMPLETED | OUTPATIENT
Start: 2021-04-29 | End: 2021-04-29

## 2021-04-29 RX ADMIN — METHOCARBAMOL 500 MG: 500 TABLET, FILM COATED ORAL at 12:28

## 2021-04-29 RX ADMIN — ENOXAPARIN SODIUM 30 MG: 30 INJECTION SUBCUTANEOUS at 21:45

## 2021-04-29 RX ADMIN — DOCUSATE SODIUM 100 MG: 100 CAPSULE, LIQUID FILLED ORAL at 08:34

## 2021-04-29 RX ADMIN — OXYCODONE HYDROCHLORIDE 10 MG: 10 TABLET ORAL at 09:32

## 2021-04-29 RX ADMIN — OXYCODONE HYDROCHLORIDE 10 MG: 10 TABLET ORAL at 16:34

## 2021-04-29 RX ADMIN — DOCUSATE SODIUM 100 MG: 100 CAPSULE, LIQUID FILLED ORAL at 17:20

## 2021-04-29 RX ADMIN — SENNOSIDES 17.2 MG: 8.6 TABLET, FILM COATED ORAL at 08:34

## 2021-04-29 RX ADMIN — BACITRACIN 1 SMALL APPLICATION: 500 OINTMENT TOPICAL at 08:34

## 2021-04-29 RX ADMIN — METHOCARBAMOL 500 MG: 500 TABLET, FILM COATED ORAL at 17:20

## 2021-04-29 RX ADMIN — METHOCARBAMOL 500 MG: 500 TABLET, FILM COATED ORAL at 05:16

## 2021-04-29 RX ADMIN — ACETAMINOPHEN 975 MG: 325 TABLET ORAL at 13:14

## 2021-04-29 RX ADMIN — ACETAMINOPHEN 975 MG: 325 TABLET ORAL at 21:46

## 2021-04-29 RX ADMIN — ACETAMINOPHEN 325 MG: 325 TABLET, FILM COATED ORAL at 00:26

## 2021-04-29 RX ADMIN — ACETAMINOPHEN 975 MG: 325 TABLET ORAL at 05:16

## 2021-04-29 RX ADMIN — METHOCARBAMOL 500 MG: 500 TABLET, FILM COATED ORAL at 23:41

## 2021-04-29 RX ADMIN — ENOXAPARIN SODIUM 30 MG: 30 INJECTION SUBCUTANEOUS at 08:34

## 2021-04-29 RX ADMIN — OXYCODONE HYDROCHLORIDE 10 MG: 10 TABLET ORAL at 05:17

## 2021-04-29 RX ADMIN — BACITRACIN 1 SMALL APPLICATION: 500 OINTMENT TOPICAL at 17:20

## 2021-04-29 RX ADMIN — OXYCODONE HYDROCHLORIDE 10 MG: 10 TABLET ORAL at 20:53

## 2021-04-29 NOTE — PHYSICAL THERAPY NOTE
Physical Therapy Progress Note     04/29/21 1359   PT Last Visit   PT Visit Date 04/29/21   Note Type   Note Type Treatment   Pain Assessment   Pain Assessment Tool 0-10   Pain Score 9   Pain Location/Orientation Location: Generalized   Restrictions/Precautions   LUE Weight Bearing Per Order NWB   RLE Weight Bearing Per Order TTWB   LLE Weight Bearing Per Order WBAT   Other Precautions WBS;Pain; Fall Risk   Subjective   Subjective pt encountered supine in bed, appeared more comfortable than in previous session  Does not appear to be in distress at rest   Reports feeling more comfortable in the chair compared to the recliner  Family present & assisted with transfers throughout session  Bed Mobility   Supine to Sit 3  Moderate assistance   Additional items Assist x 2; Increased time required;LE management   Transfers   Sit pivot 3  Moderate assistance   Additional items Assist x 2; Increased time required;Verbal cues   Balance   Static Sitting Fair   Dynamic Sitting Fair -   Endurance Deficit   Endurance Deficit Yes   Endurance Deficit Description pain   Activity Tolerance   Activity Tolerance Patient tolerated treatment well;Patient limited by pain   Nurse [address] [facility] RN   Assessment   Prognosis Good   Problem List Decreased strength;Decreased range of motion;Decreased endurance; Impaired balance;Decreased mobility;Pain;Orthopedic restrictions   Assessment Pt demonstrated improved mobility this floresita, performing all transfers with reduced assist & improved understanidnng of mechanics & transfer techniques  Pt's significant other & mother participated in treatment, assisting with dressing as per OT's note, and managing pt in all transfers with direction from this PTA & pt   pt able to perform improved LE mobility to assist, but required LE management coming off EOB due to pain  Pt able to utilize HHA to sit upright, then transitioned hand to bed to assist with balance & transferring hips to EOB    Pt remains noncompliant with LUE use, but demonstrated significantly improved adherance compared to yesterday  Pt transferred to wheelchair by way of sit pvot with family assist in similar manner to slide board transfer, moving towards R side, which pt prefered at this time  Would recommend use of slide board to allow for safer transfers, especially to surfaces of different heights, which would likely cause pt to attempt increased use of LUE to assist   pt requested to remain in cheelchair at this time  RN informed  Continue to recommend rehab at this time to continue practicing functional transfers & improeve proficiency in all aspects of safe mobility prior to returning home with reduced assist    Barriers to Discharge Inaccessible home environment;Decreased caregiver support   Goals   Patient Goals to get better   STG Expiration Date 05/06/21   PT Treatment Day 2   Plan   Treatment/Interventions Functional transfer training;LE strengthening/ROM; Therapeutic exercise; Endurance training;Patient/family training;Equipment eval/education; Bed mobility   Progress Progressing toward goals   PT Frequency Other (Comment)  (3-6x/week)   Recommendation   PT Discharge Recommendation Post acute rehabilitation services   Equipment Recommended Wheelchair  (hospital bed, slideboard, [facility])   Wheelchair Package Recommended Standard  (elevating/detachable leg rests)   AM-PAC Basic Mobility Inpatient   Turning in Bed Without Bedrails 3   Lying on Back to Sitting on Edge of Flat Bed 1   Moving Bed to Chair 1   Standing Up From Chair 1   Walk in Room 1   Climb 3-5 Stairs 1   Basic Mobility Inpatient Raw Score 8   Turning Head Towards Sound 4   Follow Simple Instructions 4   Low Function Basic Mobility Raw Score 16   Low Function Basic Mobility Standardized Score 25 72   The patient's AM-PAC Basic Mobility Inpatient Short Form Low Function Raw Score 16 , Standardized Score is 25 72   A standardized score less 42 9 suggests the patient may benefit from discharge to post-acute rehab services  Please also refer to the recommendation of the Physical Therapist for safe discharge planning        Marycarmen Dyson PTA

## 2021-04-29 NOTE — CASE MANAGEMENT
Indigent form uploaded for pt's slide board and drop arm commode   Pt not medically stable for d/c today  Plan for therapy to treat the pt with family today for more training

## 2021-04-29 NOTE — ASSESSMENT & PLAN NOTE
- CXR this AM completed   - Rip fx protocol  - IS/pulmonary toileting  - Supplemental O2 as needed to maintain O2 sat > 92%  - Febrile overnight to 101 3, WBC has been trending up, 13 99 today   - CXR this morning unremarkable  - Currently afebrile, will continue to trend fever curve   - PT/OT - inpatient rehab   - CM - patient has no insurance and will not be able to afford inpatient rehab, dispo planning to accommodate medical/PT needs at girlfriends home; patient is agreeable to dispo plan

## 2021-04-29 NOTE — PLAN OF CARE
Problem: OCCUPATIONAL THERAPY ADULT  Goal: Performs self-care activities at highest level of function for planned discharge setting  See evaluation for individualized goals  Description: Treatment Interventions: ADL retraining, Functional transfer training, Endurance training, Patient/family training, Equipment evaluation/education, Compensatory technique education, Energy conservation, Activityengagement          See flowsheet documentation for full assessment, interventions and recommendations  Outcome: Progressing  Note: Limitation: Decreased ADL status, Decreased Safe judgement during ADL, Decreased endurance, Decreased self-care trans, Decreased high-level ADLs  Prognosis: Good  Assessment: pt particpated in pm ot session and was seen focusing on sit pivot transfers bed to chair and chair to bed  during seconr trnasfer pts wife and mother assisted pt btb without assist from   therapist  pt and family performed sit pivot trnasfer in a safe mannor with  improved ability to [employer] with cues pt and family with improved ability to perform transfers and adls safely  pt tolerated oob for 1 hr 30 min this day     OT Discharge Recommendation: Post acute rehabilitation services  OT - OK to Discharge:  Yes     April A ENRIKE Roman
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
## 2021-04-29 NOTE — PLAN OF CARE
Problem: Prexisting or High Potential for Compromised Skin Integrity  Goal: Skin integrity is maintained or improved  Description: INTERVENTIONS:  - Identify patients at risk for skin breakdown  - Assess and monitor skin integrity  - Assess and monitor nutrition and hydration status  - Monitor labs   - Assess for incontinence   - Turn and reposition patient  - Assist with mobility/ambulation  - Relieve pressure over bony prominences  - Avoid friction and shearing  - Provide appropriate hygiene as needed including keeping skin clean and dry  - Evaluate need for skin moisturizer/barrier cream  - Collaborate with interdisciplinary team   - Patient/family teaching  - Consider wound care consult   Outcome: Progressing     Problem: PAIN - ADULT  Goal: Verbalizes/displays adequate comfort level or baseline comfort level  Description: Interventions:  - Encourage patient to monitor pain and request assistance  - Assess pain using appropriate pain scale  - Administer analgesics based on type and severity of pain and evaluate response  - Implement non-pharmacological measures as appropriate and evaluate response  - Consider cultural and social influences on pain and pain management  - Notify physician/advanced practitioner if interventions unsuccessful or patient reports new pain  Outcome: Progressing     Problem: INFECTION - ADULT  Goal: Absence or prevention of progression during hospitalization  Description: INTERVENTIONS:  - Assess and monitor for signs and symptoms of infection  - Monitor lab/diagnostic results  - Monitor all insertion sites, i e  indwelling lines, tubes, and drains  - Monitor endotracheal if appropriate and nasal secretions for changes in amount and color  - [place] appropriate cooling/warming therapies per order  - Administer medications as ordered  - Instruct and encourage patient and family to use good hand hygiene technique  - Identify and instruct in appropriate isolation precautions for identified infection/condition  Outcome: Progressing     Problem: SAFETY ADULT  Goal: Patient will remain free of falls  Description: INTERVENTIONS:  - Assess patient frequently for physical needs  -  Identify cognitive and physical deficits and behaviors that affect risk of falls    -  [place] fall precautions as indicated by assessment   - Educate patient/family on patient safety including physical limitations  - Instruct patient to call for assistance with activity based on assessment  - Modify environment to reduce risk of injury  - Consider OT/PT consult to assist with strengthening/mobility  Outcome: Progressing  Goal: Maintain or return to baseline ADL function  Description: INTERVENTIONS:  -  Assess patient's ability to carry out ADLs; assess patient's baseline for ADL function and identify physical deficits which impact ability to perform ADLs (bathing, care of mouth/teeth, toileting, grooming, dressing, etc )  - Assess/evaluate cause of self-care deficits   - Assess range of motion  - Assess patient's mobility; develop plan if impaired  - Assess patient's need for assistive devices and provide as appropriate  - Encourage maximum independence but intervene and supervise when necessary  - Involve family in performance of ADLs  - Assess for home care needs following discharge   - Consider OT consult to assist with ADL evaluation and planning for discharge  - Provide patient education as appropriate  Outcome: Progressing  Goal: Maintain or return mobility status to optimal level  Description: INTERVENTIONS:  - Assess patient's baseline mobility status (ambulation, transfers, stairs, etc )    - Identify cognitive and physical deficits and behaviors that affect mobility  - Identify mobility aids required to assist with transfers and/or ambulation (gait belt, sit-to-stand, lift, walker, cane, etc )  - [place] fall precautions as indicated by assessment  - Record patient progress and toleration of activity level on Mobility SBAR; progress patient to next Phase/Stage  - Instruct patient to call for assistance with activity based on assessment  - Consider rehabilitation consult to assist with strengthening/weightbearing, etc   Outcome: Progressing     Problem: DISCHARGE PLANNING  Goal: Discharge to home or other facility with appropriate resources  Description: INTERVENTIONS:  - Identify barriers to discharge w/patient and caregiver  - Arrange for needed discharge resources and transportation as appropriate  - Identify discharge learning needs (meds, wound care, etc )  - Arrange for interpretive services to assist at discharge as needed  - Refer to Case Management Department for coordinating discharge planning if the patient needs post-hospital services based on physician/advanced practitioner order or complex needs related to functional status, cognitive ability, or social support system  Outcome: Progressing     Problem: Knowledge Deficit  Goal: Patient/family/caregiver demonstrates understanding of disease process, treatment plan, medications, and discharge instructions  Description: Complete learning assessment and assess knowledge base  Interventions:  - Provide teaching at level of understanding  - Provide teaching via preferred learning methods  Outcome: Progressing     Problem: Nutrition/Hydration-ADULT  Goal: Nutrient/Hydration intake appropriate for improving, restoring or maintaining nutritional needs  Description: Monitor and assess patient's nutrition/hydration status for malnutrition  Collaborate with interdisciplinary team and initiate plan and interventions as ordered  Monitor patient's weight and dietary intake as ordered or per policy  Utilize nutrition screening tool and intervene as necessary  Determine patient's food preferences and provide high-protein, high-caloric foods as appropriate       INTERVENTIONS:  - Monitor oral intake, urinary output, labs, and treatment plans  - Assess nutrition and hydration status and recommend course of action  - Evaluate amount of meals eaten  - Assist patient with eating if necessary   - Allow adequate time for meals  - Recommend/ encourage appropriate diets, oral nutritional supplements, and vitamin/mineral supplements  - Order, calculate, and assess calorie counts as needed  - Recommend, monitor, and adjust tube feedings and TPN/PPN based on assessed needs  - Assess need for intravenous fluids  - Provide specific nutrition/hydration education as appropriate  - Include patient/family/caregiver in decisions related to nutrition  Outcome: Progressing     Problem: Potential for Falls  Goal: Patient will remain free of falls  Description: INTERVENTIONS:  - Assess patient frequently for physical needs  -  Identify cognitive and physical deficits and behaviors that affect risk of falls    -  [place] fall precautions as indicated by assessment   - Educate patient/family on patient safety including physical limitations  - Instruct patient to call for assistance with activity based on assessment  - Modify environment to reduce risk of injury  - Consider OT/PT consult to assist with strengthening/mobility  Outcome: Progressing     Problem: HEMATOLOGIC - ADULT  Goal: Maintains hematologic stability  Description: INTERVENTIONS  - Assess for signs and symptoms of bleeding or hemorrhage  - Monitor labs  - Administer supportive blood products/factors as ordered and appropriate  Outcome: Progressing     Problem: MUSCULOSKELETAL - ADULT  Goal: Maintain or return mobility to safest level of function  Description: INTERVENTIONS:  - Assess patient's ability to carry out ADLs; assess patient's baseline for ADL function and identify physical deficits which impact ability to perform ADLs (bathing, care of mouth/teeth, toileting, grooming, dressing, etc )  - Assess/evaluate cause of self-care deficits   - Assess range of motion  - Assess patient's mobility  - Assess patient's need for assistive devices and provide as appropriate  - Encourage maximum independence but intervene and supervise when necessary  - Involve family in performance of ADLs  - Assess for home care needs following discharge   - Consider OT consult to assist with ADL evaluation and planning for discharge  - Provide patient education as appropriate  Outcome: Progressing  Goal: Maintain proper alignment of affected body part  Description: INTERVENTIONS:  - Support, maintain and protect limb and body alignment  - Provide patient/ family with appropriate education  Outcome: Progressing

## 2021-04-30 LAB
DME PARACHUTE DELIVERY DATE ACTUAL: NORMAL
DME PARACHUTE DELIVERY DATE ACTUAL: NORMAL
DME PARACHUTE DELIVERY DATE REQUESTED: NORMAL
DME PARACHUTE DELIVERY DATE REQUESTED: NORMAL
DME PARACHUTE DELIVERY NOTE: NORMAL
DME PARACHUTE ITEM DESCRIPTION: NORMAL
DME PARACHUTE ITEM DESCRIPTION: NORMAL
DME PARACHUTE ORDER STATUS: NORMAL
DME PARACHUTE ORDER STATUS: NORMAL
DME PARACHUTE SUPPLIER NAME: NORMAL
DME PARACHUTE SUPPLIER NAME: NORMAL
DME PARACHUTE SUPPLIER PHONE: NORMAL
DME PARACHUTE SUPPLIER PHONE: NORMAL
ERYTHROCYTE [DISTWIDTH] IN BLOOD BY AUTOMATED COUNT: 13.3 % (ref 11.6–15.1)
HCT VFR BLD AUTO: 23.5 % (ref 36.5–49.3)
HGB BLD-MCNC: 7.6 G/DL (ref 12–17)
MCH RBC QN AUTO: 30.4 PG (ref 26.8–34.3)
MCHC RBC AUTO-ENTMCNC: 32.3 G/DL (ref 31.4–37.4)
MCV RBC AUTO: 94 FL (ref 82–98)
NRBC BLD AUTO-RTO: 0 /100 WBCS
PLATELET # BLD AUTO: 354 THOUSANDS/UL (ref 149–390)
PMV BLD AUTO: 9.1 FL (ref 8.9–12.7)
RBC # BLD AUTO: 2.5 MILLION/UL (ref 3.88–5.62)
WBC # BLD AUTO: 14.09 THOUSAND/UL (ref 4.31–10.16)

## 2021-04-30 PROCEDURE — 85027 COMPLETE CBC AUTOMATED: CPT | Performed by: ORTHOPAEDIC SURGERY

## 2021-04-30 PROCEDURE — 99232 SBSQ HOSP IP/OBS MODERATE 35: CPT | Performed by: SURGERY

## 2021-04-30 PROCEDURE — 97530 THERAPEUTIC ACTIVITIES: CPT

## 2021-04-30 PROCEDURE — NC001 PR NO CHARGE: Performed by: ORTHOPAEDIC SURGERY

## 2021-04-30 RX ORDER — DOCUSATE SODIUM 100 MG/1
100 CAPSULE, LIQUID FILLED ORAL 2 TIMES DAILY
Qty: 10 CAPSULE | Refills: 0
Start: 2021-04-30

## 2021-04-30 RX ORDER — METHOCARBAMOL 500 MG/1
500 TABLET, FILM COATED ORAL 4 TIMES DAILY PRN
Qty: 28 TABLET | Refills: 0
Start: 2021-04-30 | End: 2021-05-01 | Stop reason: SDUPTHER

## 2021-04-30 RX ORDER — ASPIRIN 325 MG
325 TABLET, DELAYED RELEASE (ENTERIC COATED) ORAL
Qty: 70 TABLET | Refills: 0
Start: 2021-04-30 | End: 2021-06-04

## 2021-04-30 RX ORDER — POLYETHYLENE GLYCOL 3350 17 G/17G
17 POWDER, FOR SOLUTION ORAL DAILY
Status: DISCONTINUED | OUTPATIENT
Start: 2021-04-30 | End: 2021-05-01 | Stop reason: HOSPADM

## 2021-04-30 RX ORDER — POLYETHYLENE GLYCOL 3350 17 G/17G
17 POWDER, FOR SOLUTION ORAL DAILY
Status: DISCONTINUED | OUTPATIENT
Start: 2021-04-30 | End: 2021-04-30 | Stop reason: SDUPTHER

## 2021-04-30 RX ORDER — ACETAMINOPHEN 325 MG/1
975 TABLET ORAL EVERY 8 HOURS SCHEDULED
Refills: 0
Start: 2021-04-30 | End: 2021-05-10

## 2021-04-30 RX ADMIN — ACETAMINOPHEN 975 MG: 325 TABLET ORAL at 21:21

## 2021-04-30 RX ADMIN — OXYCODONE HYDROCHLORIDE 10 MG: 10 TABLET ORAL at 21:21

## 2021-04-30 RX ADMIN — SENNOSIDES 17.2 MG: 8.6 TABLET, FILM COATED ORAL at 08:43

## 2021-04-30 RX ADMIN — DOCUSATE SODIUM 100 MG: 100 CAPSULE, LIQUID FILLED ORAL at 17:28

## 2021-04-30 RX ADMIN — POLYETHYLENE GLYCOL 3350 17 G: 17 POWDER, FOR SOLUTION ORAL at 12:04

## 2021-04-30 RX ADMIN — BACITRACIN 1 SMALL APPLICATION: 500 OINTMENT TOPICAL at 08:43

## 2021-04-30 RX ADMIN — METHOCARBAMOL 500 MG: 500 TABLET, FILM COATED ORAL at 17:28

## 2021-04-30 RX ADMIN — ACETAMINOPHEN 975 MG: 325 TABLET ORAL at 14:29

## 2021-04-30 RX ADMIN — OXYCODONE HYDROCHLORIDE 5 MG: 5 TABLET ORAL at 05:30

## 2021-04-30 RX ADMIN — METHOCARBAMOL 500 MG: 500 TABLET, FILM COATED ORAL at 05:30

## 2021-04-30 RX ADMIN — ENOXAPARIN SODIUM 30 MG: 30 INJECTION SUBCUTANEOUS at 21:21

## 2021-04-30 RX ADMIN — METHOCARBAMOL 500 MG: 500 TABLET, FILM COATED ORAL at 12:04

## 2021-04-30 RX ADMIN — ACETAMINOPHEN 975 MG: 325 TABLET ORAL at 05:30

## 2021-04-30 RX ADMIN — BACITRACIN 1 SMALL APPLICATION: 500 OINTMENT TOPICAL at 17:28

## 2021-04-30 RX ADMIN — OXYCODONE HYDROCHLORIDE 10 MG: 10 TABLET ORAL at 14:28

## 2021-04-30 RX ADMIN — DOCUSATE SODIUM 100 MG: 100 CAPSULE, LIQUID FILLED ORAL at 08:41

## 2021-04-30 RX ADMIN — OXYCODONE HYDROCHLORIDE 10 MG: 10 TABLET ORAL at 10:21

## 2021-04-30 RX ADMIN — ENOXAPARIN SODIUM 30 MG: 30 INJECTION SUBCUTANEOUS at 08:44

## 2021-04-30 NOTE — ASSESSMENT & PLAN NOTE
- 2 lacerations closed 4/24 by ortho   - S/P operative washout 4/25   - WBAT LLE  - F/U with orthopedics for suture removal   - PT/OT - inpatient rehab   - CM - patient has no insurance and will not be able to afford inpatient rehab, dispo planning to accommodate medical/PT needs at girlfriends home; patient is agreeable to dispo plan     - still complains of left leg pain when attempting to use the commode

## 2021-04-30 NOTE — PROGRESS NOTES
[facility]  Progress Note Jose Luis Wilson [birth year], 39 y o  male MRN: [id number]  Unit/Bed#: [facility] 608-01 Encounter: [id number]  Primary Care Provider: No primary care provider on file     Date and time admitted to hospital: 4/24/2021 10:57 AM    Closed fracture of left olecranon process  Assessment & Plan  - Associated triceps avulsion  - Seen on XR and MRI 4/24  - S/P open triceps repair 4/25 with Ortho  - NWB LUE in hinge elbow brace, with extension set to 0 degrees and flexion set to 30 degrees      Acute blood loss anemia  Assessment & Plan  - Received 1U PRBCs 4/24  - Trend Hb, hemodynamic status  Hgb 7 6 today      Femur fracture, left (HCC)  Assessment & Plan  - Ortho consult, recommendations as above     Open wound of left knee  Assessment & Plan  - 2 lacerations closed 4/24 by ortho   - S/P operative washout 4/25   - WBAT LLE  - F/U with orthopedics for suture removal   - PT/OT - inpatient rehab   - CM - patient has no insurance and will not be able to afford inpatient rehab, dispo planning to accommodate medical/PT needs at girlfriends home; patient is agreeable to dispo plan        Closed displaced segmental fracture of shaft of right femur ([address] )  Assessment & Plan  - Ortho consulted  - S/P right retrograde femoral IMN, left knee washout, and open left triceps tendon repair 4/25  - NWB LUE in splint and sling, TTWB RLE, WBAT LLE   - DVT ppx for 28 days postop   - Monitor extremity neurovascular exam  - 24hrs post op ancef completed   - Trend Hb   - Pain control  - PT/OT - inpatient rehab   - CM - patient has no insurance and will not be able to afford inpatient rehab, dispo planning to accommodate medical/PT needs at girlfriends home; patient is agreeable to dispo plan      Closed fracture of multiple ribs of left side  Assessment & Plan  - CXR this AM completed   - Rip fx protocol  - IS/pulmonary toileting  - Supplemental O2 as needed to maintain O2 sat > 92%  - Febrile overnight to 101 3, WBC has been trending up, 13 99 today   - CXR this morning unremarkable  - Currently afebrile, will continue to trend fever curve   - PT/OT - inpatient rehab   - CM - patient has no insurance and will not be able to afford inpatient rehab, dispo planning to accommodate medical/PT needs at girlfriends home; patient is agreeable to dispo plan            Disposition: home with family      SUBJECTIVE:  Chief Complaint: Pain    Subjective:" I am doing okay but it hurts"      OBJECTIVE:     Meds/Allergies     Current Facility-Administered Medications:     acetaminophen (TYLENOL) tablet 975 mg, 975 mg, Oral, Q8H [address], Liban Yen MD, 975 mg at 04/30/21 0530    bacitracin topical ointment 1 small application, 1 small application, Topical, BID, Josy Holbrook DO, 1 small application at 19/33/77 0843    docusate sodium (COLACE) capsule 100 mg, 100 mg, Oral, BID, Liban Yen MD, 100 mg at 04/30/21 0841    enoxaparin (LOVENOX) subcutaneous injection 30 mg, 30 mg, Subcutaneous, Q12H [address], Liban Yen MD, 30 mg at 04/30/21 0844    methocarbamol (ROBAXIN) tablet 500 mg, 500 mg, Oral, Q6H [address], Liban Yen MD, 500 mg at 04/30/21 1204    nicotine (NICODERM CQ) 7 mg/24hr TD 24 hr patch 1 patch, 1 patch, Transdermal, Daily, Liban Yen MD, 1 patch at 04/24/21 1434    ondansetron (ZOFRAN) injection 4 mg, 4 mg, Intravenous, Q6H PRN, Liban Yen MD    oxyCODONE (ROXICODONE) immediate release tablet 10 mg, 10 mg, Oral, Q4H PRN, Liban Yen MD, 10 mg at 04/30/21 1021    oxyCODONE (ROXICODONE) IR tablet 5 mg, 5 mg, Oral, Q4H PRN, Liban Yen MD, 5 mg at 04/30/21 0530    polyethylene glycol (MIRALAX) packet 17 g, 17 g, Oral, Daily, [place] Delcid, 17 g at 04/30/21 1204    senna (SENOKOT) tablet 17 2 mg, 2 tablet, Oral, Daily, Liban Yen MD, 17 2 mg at 04/30/21 0843     Vitals:   Vitals:    04/30/21 1048   BP: 112/64   Pulse: 82   Resp: 19   Temp: 98 6 °F (37 °C)   SpO2: 100%       Intake/Output:  I/O       04/28 0701 - 04/29 0700 04/29 0701 - 04/30 0700 04/30 0701 - 05/01 0700    P  O  720 1080 360    Total Intake(mL/kg) 720 (9) 1080 (13 5) 360 (4 5)    Urine (mL/kg/hr) 2425 (1 3) 1825 (1)     Stool 0 0     Total Output 2425 1825     Net -1705 -745 +360           Unmeasured Urine Occurrence 3 x 3 x     Unmeasured Stool Occurrence 0 x 0 x            Nutrition/GI Proph/Bowel Reg: regular    Physical Exam:   GENERAL APPEARANCE: fairly comfortable  NEURO: intact, GCS - 15  HEENT: EOm's intact  CV: RRR, no complaints of chest pain  LUNGS: CTA bilaterally, no shortness of breath  GI: tolerating a diet  : voiding  MSK: TTWB RLE, WBAT LLE, NWB LUE  SKIN: warm and dry    Invasive Devices     Peripheral Intravenous Line            Peripheral IV 04/29/21 Right Wrist 1 day                 Lab Results: Results for Shasta Saunders (MRN [id number]) as of 4/30/2021 13:22   Ref   Range 4/30/2021 04:46   WBC Latest Ref Range: 4 31 - 10 16 Thousand/uL 14 09 (H)   Red Blood Cell Count Latest Ref Range: 3 88 - 5 62 Million/uL 2 50 (L)   Hemoglobin Latest Ref Range: 12 0 - 17 0 g/dL 7 6 (L)   HCT Latest Ref Range: 36 5 - 49 3 % 23 5 (L)   MCV Latest Ref Range: 82 - 98 fL 94   MCH Latest Ref Range: 26 8 - 34 3 pg 30 4   MCHC Latest Ref Range: 31 4 - 37 4 g/dL 32 3   RDW Latest Ref Range: 11 6 - 15 1 % 13 3   Platelet Count Latest Ref Range: 149 - 390 Thousands/uL 354   MPV Latest Ref Range: 8 9 - 12 7 fL 9 1   nRBC Latest Units: /100 WBCs 0     Imaging/EKG Studies: none  Other Studies: none  VTE Prophylaxis: Sequential compression device (Venodyne)  and Enoxaparin (Lovenox)

## 2021-04-30 NOTE — PROGRESS NOTES
Progress Note - Orthopedics   Pete Denton 39 y o  male MRN: [id number]  Unit/Bed#: [facility] 608-01      Subjective:    39 y o male POD 5 right femoral IMN, left knee washout for traumatic arthrotomy, left open triceps repair  Patient's pain is controlled  No acute events      Labs:  0   Lab Value Date/Time    HCT 23 5 (L) 04/30/2021 0446    HCT 21 7 (L) 04/29/2021 0514    HCT 23 2 (L) 04/28/2021 0910    HGB 7 6 (L) 04/30/2021 0446    HGB 7 2 (L) 04/29/2021 0514    HGB 7 6 (L) 04/28/2021 0910    INR 0 92 04/24/2021 1116    WBC 14 09 (H) 04/30/2021 0446    WBC 13 99 (H) 04/29/2021 0514    WBC 12 69 (H) 04/28/2021 0910       Meds:    Current Facility-Administered Medications:     acetaminophen (TYLENOL) tablet 975 mg, 975 mg, Oral, Q8H [facility] & half-way, Chayo Ramirez MD, 975 mg at 04/30/21 0530    bacitracin topical ointment 1 small application, 1 small application, Topical, BID, Dionne Cr DO, 1 small application at 54/62/25 1720    docusate sodium (COLACE) capsule 100 mg, 100 mg, Oral, BID, Chayo Ramirez MD, 100 mg at 04/29/21 1720    enoxaparin (LOVENOX) subcutaneous injection 30 mg, 30 mg, Subcutaneous, Q12H [facility] & half-way, Chayo Ramirez MD, 30 mg at 04/29/21 2145    methocarbamol (ROBAXIN) tablet 500 mg, 500 mg, Oral, Q6H [facility], Chayo Ramirez MD, 500 mg at 04/30/21 0530    nicotine (NICODERM CQ) 7 mg/24hr TD 24 hr patch 1 patch, 1 patch, Transdermal, Daily, Chayo Ramirez MD, 1 patch at 04/24/21 1434    ondansetron (ZOFRAN) injection 4 mg, 4 mg, Intravenous, Q6H PRN, Chayo Ramirez MD    oxyCODONE (ROXICODONE) immediate release tablet 10 mg, 10 mg, Oral, Q4H PRN, Chayo Ramirez MD, 10 mg at 04/29/21 2053    oxyCODONE (ROXICODONE) IR tablet 5 mg, 5 mg, Oral, Q4H PRN, Chayo Ramirez MD, 5 mg at 04/30/21 0530    senna (SENOKOT) tablet 17 2 mg, 2 tablet, Oral, Daily, Chayo Ramirez MD, 17 2 mg at 04/29/21 0834    Blood Culture:   No results found for: BLOODCX    Wound Culture:   No results found for: WOUNDCULT    Ins and Outs:  I/O last 24 hours: In: 1080 [P O :1080]  Out: 0675 [Urine:5495]          Physical:  Vitals:    04/30/21 0313   BP: 120/66   Pulse: 87   Resp: 12   Temp:    SpO2: 98%     Musculoskeletal: left Upper Extremity  · Dressing c/d/i, hinged elbow brace in place  · TTP  Left elbow  · SILT m/r/u    · Motor intact ain/pin/m/r/u  · 2+ radial pulse    Bilateral lower extremities  · Dressing c/d/i with knee immobilizer present on the left  · TTP bilateral knees  · SILT s/s/sp/dp/t   ·  +fhl/ehl, +ankle dorsi/plantar flexion  · 2+ DP pulse    Assessment:    41 y o male  POD 5 right femoral IMN, left knee washout for traumatic arthrotomy, left open triceps repair   Patient doing well    Plan:  · TTWB RLE, WBAT LLE in KI, NWB LUE in sling  · Patient has acute blood loss anemia, will monitor and administer IVF/prbc as indicated   · PT/OT  · Pain control  · DVT ppx  · Dispo: Ortho will follow    Jordon Larkin MD

## 2021-04-30 NOTE — PLAN OF CARE
Problem: PHYSICAL THERAPY ADULT  Goal: Performs mobility at highest level of function for planned discharge setting  See evaluation for individualized goals  Description: Treatment/Interventions: Functional transfer training, LE strengthening/ROM, Therapeutic exercise, Endurance training, Patient/family training, Equipment eval/education, Bed mobility, Continued evaluation, Spoke to nursing, OT  Equipment Recommended: Wheelchair       See flowsheet documentation for full assessment, interventions and recommendations  Outcome: Progressing  Note: Prognosis: Good  Problem List: Decreased strength, Decreased range of motion, Decreased endurance, Impaired balance, Decreased mobility, Pain, Orthopedic restrictions  Assessment: Pt continues to perform all transfers with Ax2 for LE management & RUE HHA to sit upright  pt able to give commands for sequencing & LE management for comfort  Mother assisted with transfer & demosntrated good understanding of sequencing per previous sessions  Pt continues to utilize LUE despite weight bearing restrictions for balance, especially during sit pivot transfers  Pt able to transfer to & from [facility] without LOB, but demosntrated difficulty doffing underwear while sitting on it, requring significant assist   Pt unable to have BM  Pt demonstrated similar proficiency with transfer back to bed, but requred increased assist to elevate LEs onto bed  Pt & mother educated on safe mobiilty & use of bed pan as opposed to [facility] presently until pt better able to assist or perform stand pivot on LLE  Pt instructed in possible backwards scoot, but did not attempt this session  Plan for further practice of mobility tasks with family involvement in preparation for discharge home when appropriate  Recommendation remains for discharge to rehab to ensure sufficient level of assit to maintain safety & further practice these tasks prior to return home    Pt has LETTY & would require bumping up/down in wheelchair, which has not been attempted  Barriers to Discharge: Inaccessible home environment, Decreased caregiver support        PT Discharge Recommendation: Post acute rehabilitation services     PT - OK to Discharge: Yes(To rehab when medically cleared)    See flowsheet documentation for full assessment

## 2021-04-30 NOTE — PHYSICAL THERAPY NOTE
Physical Therapy Progress Note     04/30/21 1343   PT Last Visit   PT Visit Date 04/30/21   Note Type   Note Type Treatment   Pain Assessment   Pain Assessment Tool FLACC   Pain Rating: FLACC (Rest) - Face 1   Pain Rating: FLACC (Rest) - Legs 0   Pain Rating: FLACC (Rest) - Activity 1   Pain Rating: FLACC (Rest) - Cry 1   Pain Rating: FLACC (Rest) - Consolability 0   Score: FLACC (Rest) 3   Pain Rating: FLACC (Activity) - Face 2   Pain Rating: FLACC (Activity) - Legs 1   Pain Rating: FLACC (Activity) - Activity 1   Pain Rating: FLACC (Activity) - Cry 2   Pain Rating: FLACC (Activity) - Consolability 1   Score: FLACC (Activity) 7   Restrictions/Precautions   LUE Weight Bearing Per Order NWB   RLE Weight Bearing Per Order TTWB   LLE Weight Bearing Per Order WBAT  (in immobilizer)   Braces or Orthoses LE Immobilizer  (LUE hinged elbow brace)   Subjective   Subjective Pt encountered supine in bed, reporing increased LE pain bilaterally, but agreeable to attempt to use BSC  Pt able to give instructions for LE management & sequencing for comfort  Mother present to assist    Bed Mobility   Supine to Sit 3  Moderate assistance   Additional items Assist x 2   Sit to Supine 3  Moderate assistance   Additional items Assist x 2   Balance   Static Sitting Fair   Dynamic Sitting Poor +   Endurance Deficit   Endurance Deficit Yes   Endurance Deficit Description pain   Activity Tolerance   Activity Tolerance Patient limited by pain   Nurse [address] [place] [address] Drive, RN   Assessment   Prognosis Good   Problem List Decreased strength;Decreased range of motion;Decreased endurance; Impaired balance;Decreased mobility;Pain;Orthopedic restrictions   Assessment Pt continues to perform all transfers with Ax2 for LE management & RUE HHA to sit upright  pt able to give commands for sequencing & LE management for comfort  Mother assisted with transfer & demosntrated good understanding of sequencing per previous sessions    Pt continues to utilize LUE despite weight bearing restrictions for balance, especially during sit pivot transfers  Pt able to transfer to & from [facility] without LOB, but demosntrated difficulty doffing underwear while sitting on it, requring significant assist   Pt unable to have BM  Pt demonstrated similar proficiency with transfer back to bed, but requred increased assist to elevate LEs onto bed  Pt & mother educated on safe mobiilty & use of bed pan as opposed to [facility] presently until pt better able to assist or perform stand pivot on LLE  Pt instructed in possible backwards scoot, but did not attempt this session  Plan for further practice of mobility tasks with family involvement in preparation for discharge home when appropriate  Recommendation remains for discharge to rehab to ensure sufficient level of assit to maintain safety & further practice these tasks prior to return home  Pt has LETTY & would require bumping up/down in wheelchair, which has not been attempted  Barriers to Discharge Inaccessible home environment;Decreased caregiver support   Goals   Patient Goals to have less pain   STG Expiration Date 05/06/21   PT Treatment Day 3   Plan   Treatment/Interventions Functional transfer training;LE strengthening/ROM; Therapeutic exercise; Endurance training;Patient/family training;Equipment eval/education; Bed mobility   Progress Progressing toward goals   PT Frequency Other (Comment)  (3-6x/week)   Recommendation   PT Discharge Recommendation Post acute rehabilitation services   Equipment Recommended Wheelchair; Other (Comment)  (drop arm BSC, slideboard)   Wheelchair Package Recommended Standard  (elevating/adjustable leg rests, drop arm)   AM-PAC Basic Mobility Inpatient   Turning in Bed Without Bedrails 3   Lying on Back to Sitting on Edge of Flat Bed 1   Moving Bed to Chair 1   Standing Up From Chair 1   Walk in Room 1   Climb 3-5 Stairs 1   Basic Mobility Inpatient Raw Score 8   Turning Head Towards Sound 4   Follow Simple Instructions 4   Low Function Basic Mobility Raw Score 16   Low Function Basic Mobility Standardized Score 25 72   The patient's AM-PAC Basic Mobility Inpatient Short Form Low Function Raw Score 16 , Standardized Score is 25 72  A standardized score less 42 9 suggests the patient may benefit from discharge to post-acute rehab services  Please also refer to the recommendation of the Physical Therapist for safe discharge planning        Karen Ortiz PTA

## 2021-04-30 NOTE — ASSESSMENT & PLAN NOTE
- Associated triceps avulsion  - Seen on XR and MRI 4/24  - S/P open triceps repair 4/25 with Ortho  - NWB LUE in hinge elbow brace, with extension set to 0 degrees and flexion set to 30 degrees    - Sensation intact, moves fingers

## 2021-04-30 NOTE — PLAN OF CARE
Problem: Prexisting or High Potential for Compromised Skin Integrity  Goal: Skin integrity is maintained or improved  Description: INTERVENTIONS:  - Identify patients at risk for skin breakdown  - Assess and monitor skin integrity  - Assess and monitor nutrition and hydration status  - Monitor labs   - Assess for incontinence   - Turn and reposition patient  - Assist with mobility/ambulation  - Relieve pressure over bony prominences  - Avoid friction and shearing  - Provide appropriate hygiene as needed including keeping skin clean and dry  - Evaluate need for skin moisturizer/barrier cream  - Collaborate with interdisciplinary team   - Patient/family teaching  - Consider wound care consult   Outcome: Progressing     Problem: PAIN - ADULT  Goal: Verbalizes/displays adequate comfort level or baseline comfort level  Description: Interventions:  - Encourage patient to monitor pain and request assistance  - Assess pain using appropriate pain scale  - Administer analgesics based on type and severity of pain and evaluate response  - Implement non-pharmacological measures as appropriate and evaluate response  - Consider cultural and social influences on pain and pain management  - Notify physician/advanced practitioner if interventions unsuccessful or patient reports new pain  Outcome: Progressing     Problem: SAFETY ADULT  Goal: Patient will remain free of falls  Description: INTERVENTIONS:  - Assess patient frequently for physical needs  -  Identify cognitive and physical deficits and behaviors that affect risk of falls    -  [place] fall precautions as indicated by assessment   - Educate patient/family on patient safety including physical limitations  - Instruct patient to call for assistance with activity based on assessment  - Modify environment to reduce risk of injury  - Consider OT/PT consult to assist with strengthening/mobility  Outcome: Progressing  Goal: Maintain or return to baseline ADL function  Description: INTERVENTIONS:  -  Assess patient's ability to carry out ADLs; assess patient's baseline for ADL function and identify physical deficits which impact ability to perform ADLs (bathing, care of mouth/teeth, toileting, grooming, dressing, etc )  - Assess/evaluate cause of self-care deficits   - Assess range of motion  - Assess patient's mobility; develop plan if impaired  - Assess patient's need for assistive devices and provide as appropriate  - Encourage maximum independence but intervene and supervise when necessary  - Involve family in performance of ADLs  - Assess for home care needs following discharge   - Consider OT consult to assist with ADL evaluation and planning for discharge  - Provide patient education as appropriate  Outcome: Progressing  Goal: Maintain or return mobility status to optimal level  Description: INTERVENTIONS:  - Assess patient's baseline mobility status (ambulation, transfers, stairs, etc )    - Identify cognitive and physical deficits and behaviors that affect mobility  - Identify mobility aids required to assist with transfers and/or ambulation (gait belt, sit-to-stand, lift, walker, cane, etc )  - [place] fall precautions as indicated by assessment  - Record patient progress and toleration of activity level on Mobility SBAR; progress patient to next Phase/Stage  - Instruct patient to call for assistance with activity based on assessment  - Consider rehabilitation consult to assist with strengthening/weightbearing, etc   Outcome: Progressing     Problem: Nutrition/Hydration-ADULT  Goal: Nutrient/Hydration intake appropriate for improving, restoring or maintaining nutritional needs  Description: Monitor and assess patient's nutrition/hydration status for malnutrition  Collaborate with interdisciplinary team and initiate plan and interventions as ordered  Monitor patient's weight and dietary intake as ordered or per policy   Utilize nutrition screening tool and intervene as necessary  Determine patient's food preferences and provide high-protein, high-caloric foods as appropriate  INTERVENTIONS:  - Monitor oral intake, urinary output, labs, and treatment plans  - Assess nutrition and hydration status and recommend course of action  - Evaluate amount of meals eaten  - Assist patient with eating if necessary   - Allow adequate time for meals  - Recommend/ encourage appropriate diets, oral nutritional supplements, and vitamin/mineral supplements  - Order, calculate, and assess calorie counts as needed  - Recommend, monitor, and adjust tube feedings and TPN/PPN based on assessed needs  - Assess need for intravenous fluids  - Provide specific nutrition/hydration education as appropriate  - Include patient/family/caregiver in decisions related to nutrition  Outcome: Progressing     Problem: Potential for Falls  Goal: Patient will remain free of falls  Description: INTERVENTIONS:  - Assess patient frequently for physical needs  -  Identify cognitive and physical deficits and behaviors that affect risk of falls    -  [place] fall precautions as indicated by assessment   - Educate patient/family on patient safety including physical limitations  - Instruct patient to call for assistance with activity based on assessment  - Modify environment to reduce risk of injury  - Consider OT/PT consult to assist with strengthening/mobility  Outcome: Progressing     Problem: SKIN/TISSUE INTEGRITY - ADULT  Goal: Skin integrity remains intact  Description: INTERVENTIONS  - Identify patients at risk for skin breakdown  - Assess and monitor skin integrity  - Assess and monitor nutrition and hydration status  - Monitor labs (i e  albumin)  - Assess for incontinence   - Turn and reposition patient  - Assist with mobility/ambulation  - Relieve pressure over bony prominences  - Avoid friction and shearing  - Provide appropriate hygiene as needed including keeping skin clean and dry  - Evaluate need for skin moisturizer/barrier cream  - Collaborate with interdisciplinary team (i e  Nutrition, Rehabilitation, etc )   - Patient/family teaching  Outcome: Progressing  Goal: Incision(s), wounds(s) or drain site(s) healing without S/S of infection  Description: INTERVENTIONS  - Assess and document risk factors for skin impairment   - Assess and document dressing, incision, wound bed, drain sites and surrounding tissue  - Consider nutrition services referral as needed  - Oral mucous membranes remain intact  - Provide patient/ family education  Outcome: Progressing  Goal: Oral mucous membranes remain intact  Description: INTERVENTIONS  - Assess oral mucosa and hygiene practices  - Implement preventative oral hygiene regimen  - Implement oral medicated treatments as ordered  - Initiate Nutrition services referral as needed  Outcome: Progressing     Problem: HEMATOLOGIC - ADULT  Goal: Maintains hematologic stability  Description: INTERVENTIONS  - Assess for signs and symptoms of bleeding or hemorrhage  - Monitor labs  - Administer supportive blood products/factors as ordered and appropriate  Outcome: Progressing     Problem: MUSCULOSKELETAL - ADULT  Goal: Maintain or return mobility to safest level of function  Description: INTERVENTIONS:  - Assess patient's ability to carry out ADLs; assess patient's baseline for ADL function and identify physical deficits which impact ability to perform ADLs (bathing, care of mouth/teeth, toileting, grooming, dressing, etc )  - Assess/evaluate cause of self-care deficits   - Assess range of motion  - Assess patient's mobility  - Assess patient's need for assistive devices and provide as appropriate  - Encourage maximum independence but intervene and supervise when necessary  - Involve family in performance of ADLs  - Assess for home care needs following discharge   - Consider OT consult to assist with ADL evaluation and planning for discharge  - Provide patient education as appropriate  Outcome: Progressing  Goal: Maintain proper alignment of affected body part  Description: INTERVENTIONS:  - Support, maintain and protect limb and body alignment  - Provide patient/ family with appropriate education  Outcome: Progressing

## 2021-04-30 NOTE — CASE MANAGEMENT
Pt potentially can d/c over weekend if medically stable  Therapy has been doing family training  Pt has his drop arm commode and slide board  Pt's aunt claims she has a wheelchair and Hospital Bed  Pt will need BLS transportation to home once medically cleared

## 2021-04-30 NOTE — ASSESSMENT & PLAN NOTE
- Received 1U PRBCs 4/24  - Trend Hb, hemodynamic status  Hgb 7 6 today 4/30  Continues with low grade 99 4 fevers throughout the night

## 2021-05-01 VITALS
HEART RATE: 98 BPM | WEIGHT: 176.37 LBS | HEIGHT: 64 IN | RESPIRATION RATE: 16 BRPM | BODY MASS INDEX: 30.11 KG/M2 | SYSTOLIC BLOOD PRESSURE: 101 MMHG | TEMPERATURE: 99 F | DIASTOLIC BLOOD PRESSURE: 82 MMHG | OXYGEN SATURATION: 99 %

## 2021-05-01 DIAGNOSIS — S72.301A CLOSED FRACTURE OF SHAFT OF RIGHT FEMUR, UNSPECIFIED FRACTURE MORPHOLOGY, INITIAL ENCOUNTER (HCC): ICD-10-CM

## 2021-05-01 PROCEDURE — NC001 PR NO CHARGE: Performed by: SURGERY

## 2021-05-01 PROCEDURE — 99238 HOSP IP/OBS DSCHRG MGMT 30/<: CPT | Performed by: NURSE PRACTITIONER

## 2021-05-01 RX ORDER — POLYETHYLENE GLYCOL 3350 17 G/17G
17 POWDER, FOR SOLUTION ORAL DAILY
Refills: 0
Start: 2021-05-02

## 2021-05-01 RX ORDER — MAGNESIUM CARB/ALUMINUM HYDROX 105-160MG
296 TABLET,CHEWABLE ORAL ONCE
Status: DISCONTINUED | OUTPATIENT
Start: 2021-05-01 | End: 2021-05-01 | Stop reason: HOSPADM

## 2021-05-01 RX ORDER — OXYCODONE HYDROCHLORIDE 5 MG/1
TABLET ORAL
Qty: 10 TABLET | Refills: 0 | Status: SHIPPED | OUTPATIENT
Start: 2021-05-01 | End: 2021-05-21 | Stop reason: ALTCHOICE

## 2021-05-01 RX ORDER — METHOCARBAMOL 500 MG/1
500 TABLET, FILM COATED ORAL 4 TIMES DAILY PRN
Qty: 28 TABLET | Refills: 0
Start: 2021-05-01 | End: 2021-05-02 | Stop reason: SDUPTHER

## 2021-05-01 RX ADMIN — OXYCODONE HYDROCHLORIDE 10 MG: 10 TABLET ORAL at 05:29

## 2021-05-01 RX ADMIN — ENOXAPARIN SODIUM 30 MG: 30 INJECTION SUBCUTANEOUS at 08:45

## 2021-05-01 RX ADMIN — SENNOSIDES 17.2 MG: 8.6 TABLET, FILM COATED ORAL at 08:45

## 2021-05-01 RX ADMIN — METHOCARBAMOL 500 MG: 500 TABLET, FILM COATED ORAL at 12:36

## 2021-05-01 RX ADMIN — METHOCARBAMOL 500 MG: 500 TABLET, FILM COATED ORAL at 05:29

## 2021-05-01 RX ADMIN — OXYCODONE HYDROCHLORIDE 10 MG: 10 TABLET ORAL at 16:23

## 2021-05-01 RX ADMIN — ACETAMINOPHEN 975 MG: 325 TABLET ORAL at 05:28

## 2021-05-01 RX ADMIN — POLYETHYLENE GLYCOL 3350 17 G: 17 POWDER, FOR SOLUTION ORAL at 08:45

## 2021-05-01 RX ADMIN — DOCUSATE SODIUM 100 MG: 100 CAPSULE, LIQUID FILLED ORAL at 08:45

## 2021-05-01 RX ADMIN — BACITRACIN 1 SMALL APPLICATION: 500 OINTMENT TOPICAL at 08:45

## 2021-05-01 RX ADMIN — METHOCARBAMOL 500 MG: 500 TABLET, FILM COATED ORAL at 00:44

## 2021-05-01 NOTE — TRANSPORTATION MEDICAL NECESSITY
Section I - General Information    Name of Patient: Alvarez Quintero                 : 1980    Medicare #: [id number]  Transport Date: 21 (PCS is valid for round trips on this date and for all repetitive trips in the 60-day range as noted below )  Origin: [facility]                                                         Destination: [address] [address] [place] [address], [address]  Is the pt's stay covered under Medicare Part A (PPS/DRG)   []     Closest appropriate facility? If no, why is transport to more distant facility required? Yes  If hospice pt, is this transport related to pt's terminal illness? NA       Section II - Medical Necessity Questionnaire  Ambulance transportation is medically necessary only if other means of transport are contraindicated or would be potentially harmful to the patient  To meet this requirement, the patient must either be "bed confined" or suffer from a condition such that transport by means other than ambulance is contraindicated by the patient's condition  The following questions must be answered by the medical professional signing below for this form to be valid:    1)  Describe the MEDICAL CONDITION (physical and/or mental) of this patient AT [address] [place] [address] that requires the patient to be transported in an ambulance and why transport by other means is contraindicated by the patient's condition: Fracture R and L femur, L olecranon process, ribs    2) Is the patient "bed confined" as defined below? Yes  To be "be confined" the patient must satisfy all three of the following conditions: (1) unable to get up from bed without Assistance; AND (2) unable to ambulate; AND (3) unable to sit in a chair or wheelchair  3) Can this patient safely be transported by car or wheelchair van (i e , seated during transport without a medical attendant or monitoring)?    No    4) In addition to completing questions 1-3 above, please check any of the following conditions that apply*:   *Note: supporting documentation for any boxes checked must be maintained in the patient's medical records  If hosp-hosp transfer, describe services needed at 2nd facility not available at 1st facility? Non-Healed Fractures  Moderate/severe pain on movement   Unable to tolerate seated position for time needed to transport       Section III - Signature of Physician or Healthcare Professional  I certify that the above information is true and correct based on my evaluation of this patient, and represent that the patient requires transport by ambulance and that other forms of transport are contraindicated  I understand that this information will be used by the Centers for Medicare and Medicaid Services (CMS) to support the determination of medical necessity for ambulance services, and I represent that I have personal knowledge of the patient's condition at time of transport  []  If this box is checked, I also certify that the patient is physically or mentally incapable of signing the ambulance service's claim and that the institution with which I am affiliated has furnished care, services, or assistance to the patient  My signature below is made on behalf of the patient pursuant to 42 CFR §424 36(b)(4)  In accordance with 42 CFR §424 37, the specific reason(s) that the patient is physically or mentally incapable of signing the claim form is as follows: Damari Hauserin of Physician* or Healthcare Professional______________________________________________________________  Signature Date 05/01/21 (For scheduled repetitive transports, this form is not valid for transports performed more than 60 days after this date)    Printed Name & Credentials of Physician or Healthcare Professional (MD, DO, RN, etc )________________________________  *Form must be signed by patient's attending physician for scheduled, repetitive transports   For non-repetitive, unscheduled ambulance transports, if unable to obtain the signature of the attending physician, any of the following may sign (choose appropriate option below)  [] Physician Assistant []  Clinical Nurse Specialist []  Registered Nurse  []  Nurse Practitioner  [] Discharge Planner
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
## 2021-05-02 RX ORDER — METHOCARBAMOL 500 MG/1
500 TABLET, FILM COATED ORAL 4 TIMES DAILY
Qty: 40 TABLET | Refills: 0
Start: 2021-05-02 | End: 2021-05-11 | Stop reason: SDUPTHER

## 2021-05-03 ENCOUNTER — TELEPHONE (OUTPATIENT)
Dept: SURGERY | Facility: CLINIC | Age: 41
End: 2021-05-03
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
## 2021-05-03 NOTE — TELEPHONE ENCOUNTER
Called patient back to inform them that there was a computer glitch with the electronic prescription for Robaxin  I called pharmacy and completed verbal order  Patient is aware and will  prescription

## 2021-05-06 ENCOUNTER — TELEPHONE (OUTPATIENT)
Dept: SURGERY | Facility: CLINIC | Age: 41
End: 2021-05-06

## 2021-05-06 NOTE — TELEPHONE ENCOUNTER
This note is from 5/1/21 at which time of 16:00 patient was discharged from the hospital to home via arranged transport  Scripts were e-prescribed to [facility] on [address]  Scripts went through and patient discharged  At 21:30 pm I received a call from family member stating the pharmacy closed before they picked up the meds and I needed to take care of his pain  They wanted scripts changed to [facility] on [address]  I called and spike with that pharmacist who verified that the scripts were filled and waiting to be picked up at [facility]  We decided to order just enough Oxy and Robaxin to get him through the night  Once again e-prescribed script by 9:41 pm   Now on May 2, 2021 at close to 10 am got a call from female named Rosie Gray yelling at me for being inconsiderate and not taking care of Pete's pain  Preoceded to tell me I have a lot of attitude and need to do my job  When she settled down I asked what she meant  Came to find out again even though all night pharmacy she niever picked up patients medications from las t night at 10:30 pm   Once again yelling at me that I need to fix this  I calmly explained that they need to go to the pharmacy to pick the medications up  Also advied her that there would be no further medications  Will be addressed by Orthopedics since his injuries were all isolated Orthopedic injuries and will not be following up with trauma but rather Orthopedics  Again this young woman not happy

## 2021-05-07 ENCOUNTER — HOSPITAL ENCOUNTER (OUTPATIENT)
Dept: RADIOLOGY | Facility: HOSPITAL | Age: 41
Discharge: HOME | End: 2021-05-07
Attending: ORTHOPAEDIC SURGERY
Payer: COMMERCIAL

## 2021-05-07 ENCOUNTER — OFFICE VISIT (OUTPATIENT)
Dept: OBGYN CLINIC | Facility: HOSPITAL | Age: 41
End: 2021-05-07

## 2021-05-07 VITALS
DIASTOLIC BLOOD PRESSURE: 82 MMHG | SYSTOLIC BLOOD PRESSURE: 101 MMHG | BODY MASS INDEX: 30.56 KG/M2 | HEART RATE: 98 BPM | HEIGHT: 64 IN | WEIGHT: 179 LBS

## 2021-05-07 DIAGNOSIS — S46.312D TRICEPS TENDON RUPTURE, LEFT, SUBSEQUENT ENCOUNTER: ICD-10-CM

## 2021-05-07 DIAGNOSIS — S72.301A CLOSED FRACTURE OF SHAFT OF RIGHT FEMUR, UNSPECIFIED FRACTURE MORPHOLOGY, INITIAL ENCOUNTER (HCC): Primary | ICD-10-CM

## 2021-05-07 DIAGNOSIS — G25.81 RESTLESS LEG SYNDROME: ICD-10-CM

## 2021-05-07 DIAGNOSIS — S72.301A CLOSED FRACTURE OF SHAFT OF RIGHT FEMUR, UNSPECIFIED FRACTURE MORPHOLOGY, INITIAL ENCOUNTER (HCC): ICD-10-CM

## 2021-05-07 PROCEDURE — 99024 POSTOP FOLLOW-UP VISIT: CPT | Performed by: ORTHOPAEDIC SURGERY

## 2021-05-07 PROCEDURE — 73552 X-RAY EXAM OF FEMUR 2/>: CPT

## 2021-05-07 NOTE — PROGRESS NOTES
Assessment:   S/P Insertion Nail Im Femur Retrograde - Right, Incision And Drainage (i&d) Left Knee - Left, and Repair Tendon, Triceps - Left on 4/25/2021    Plan:     · Remain non weightbearing RLE and LUE  · WBAT LLE  · Discontinue left knee immobilizer   · Left elbow brace open from 0 to 90 degrees  · Referral placed for family medicine for restless leg syndrome  · Follow up 2 weeks with right femur x-rays      SUBJECTIVE:  Irene Betancourt is a 39 y o  male who presents 12 days s/p after Insertion Nail Im Femur Retrograde - Right, Incision And Drainage (i&d) Left Knee - Left, and Repair Tendon, Triceps - Left on 4/25/2021  PHYSICAL EXAMINATION:  Vital signs: There were no vitals taken for this visit  General: well developed and well nourished, alert, oriented times 3 and appears comfortable  Psychiatric: Normal    MUSCULOSKELETAL EXAMINATION:    Surgical Site: Right knee  Incision: Clean, dry, intact  Range of Motion: As expected  Neurovascular status: Neuro intact, good cap refill    Surgical Site: Left knee  Incision: Clean, dry, intact  Range of Motion: As expected  Neurovascular status: Neuro intact, good cap refill    Surgical Site: left elbow  Incision: Clean, dry, intact, mild swelling  Range of Motion: As expected  Neurovascular status: Neuro intact, good cap refill      STUDIES REVIEWED:  Imaging studies reviewed by Dr Kurt Woods and demonstrates right femur x-rays, hardware in acceptable alignment and position, fracture remains in acceptable alignment         PROCEDURES PERFORMED:  Procedures  No Procedures performed today

## 2021-05-21 ENCOUNTER — OFFICE VISIT (OUTPATIENT)
Dept: OBGYN CLINIC | Facility: HOSPITAL | Age: 41
End: 2021-05-21

## 2021-05-21 ENCOUNTER — HOSPITAL ENCOUNTER (OUTPATIENT)
Dept: RADIOLOGY | Facility: HOSPITAL | Age: 41
Discharge: HOME | End: 2021-05-21
Attending: ORTHOPAEDIC SURGERY
Payer: COMMERCIAL

## 2021-05-21 VITALS
DIASTOLIC BLOOD PRESSURE: 82 MMHG | HEART RATE: 98 BPM | HEIGHT: 64 IN | BODY MASS INDEX: 30.56 KG/M2 | SYSTOLIC BLOOD PRESSURE: 101 MMHG | WEIGHT: 179 LBS

## 2021-05-21 DIAGNOSIS — S72.301A CLOSED FRACTURE OF SHAFT OF RIGHT FEMUR, UNSPECIFIED FRACTURE MORPHOLOGY, INITIAL ENCOUNTER (HCC): Primary | ICD-10-CM

## 2021-05-21 DIAGNOSIS — S72.301A CLOSED FRACTURE OF SHAFT OF RIGHT FEMUR, UNSPECIFIED FRACTURE MORPHOLOGY, INITIAL ENCOUNTER (HCC): ICD-10-CM

## 2021-05-21 PROCEDURE — 99024 POSTOP FOLLOW-UP VISIT: CPT | Performed by: ORTHOPAEDIC SURGERY

## 2021-05-21 PROCEDURE — 73552 X-RAY EXAM OF FEMUR 2/>: CPT

## 2021-05-21 NOTE — PROGRESS NOTES
Assessment:   S/P Insertion Nail Im Femur Retrograde - Right, Incision And Drainage (i&d) Left Knee - Left, and Repair Tendon, Triceps - Left on 4/25/2021    Plan:   1  Referral to physical therapy for ROM bilateral knees, stretching and strengthening  2  WBAT LLE  3  NWB RLE  4  Follow up in 4 weeks - repeat xrays of the right femur at next visit  5  Garret Oris will remain out of work until cleared by orthopedics (Marshal Felty)       SUBJECTIVE:  Carie Del Angel is a 39 y o  male who presents for right femur follow up after Insertion Nail Im Femur Retrograde - Right, Incision And Drainage (i&d) Left Knee - Left, and Repair Tendon, Triceps - Left on 4/25/2021  Garret Oris denies significant pain in left elbow, bilateral knees  He has been compliant with his NWB RLE  He is using walker for ambulation  He states the knees feels tight and is hesitant to bend too far for fear of injuring the knees  Denies fever or chills    Admits to some numbness of the right thigh above knee    PHYSICAL EXAMINATION:  Vital signs: /82   Pulse 98   Ht 5' 4" (1 626 m)   Wt 81 2 kg (179 lb)   BMI 30 73 kg/m²   General: well developed and well nourished, alert, oriented times 3 and appears comfortable  Psychiatric: Normal    MUSCULOSKELETAL EXAMINATION:    Surgical Site: healed incisions - no erythema or drainage  Incision: Clean, dry, intact  Range of Motion: As expected and Limited due to stiffness   · Right knee: extension -5; flexion 90  · Left knee: extension 0; flexion 100  · Elbow: -5 extension; flexion 100  No effusions of knee  -  bilateral  Neurovascular status: Neuro intact, good cap refill        STUDIES REVIEWED:  Imaging studies reviewed by Dr Cinthia Cárdenas and demonstrate right femur shaft fracture; hardware without complication  Some interval healing along medial cortex     PROCEDURES PERFORMED:  Procedures  No Procedures performed today

## 2021-05-26 ENCOUNTER — EVALUATION (OUTPATIENT)
Dept: PHYSICAL THERAPY | Facility: REHABILITATION | Age: 41
End: 2021-05-26
Payer: COMMERCIAL

## 2021-05-26 DIAGNOSIS — S72.391D OTHER CLOSED FRACTURE OF SHAFT OF RIGHT FEMUR WITH ROUTINE HEALING, SUBSEQUENT ENCOUNTER: Primary | ICD-10-CM

## 2021-05-26 PROCEDURE — 97110 THERAPEUTIC EXERCISES: CPT | Performed by: PHYSICAL THERAPIST

## 2021-05-26 PROCEDURE — 97162 PT EVAL MOD COMPLEX 30 MIN: CPT | Performed by: PHYSICAL THERAPIST
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
## 2021-06-02 ENCOUNTER — OFFICE VISIT (OUTPATIENT)
Dept: PHYSICAL THERAPY | Facility: REHABILITATION | Age: 41
End: 2021-06-02
Payer: COMMERCIAL

## 2021-06-02 DIAGNOSIS — M25.562 ACUTE PAIN OF LEFT KNEE: ICD-10-CM

## 2021-06-02 DIAGNOSIS — S72.391D OTHER CLOSED FRACTURE OF SHAFT OF RIGHT FEMUR WITH ROUTINE HEALING, SUBSEQUENT ENCOUNTER: Primary | ICD-10-CM

## 2021-06-02 PROCEDURE — 97110 THERAPEUTIC EXERCISES: CPT | Performed by: PHYSICAL THERAPIST

## 2021-06-02 PROCEDURE — 97140 MANUAL THERAPY 1/> REGIONS: CPT | Performed by: PHYSICAL THERAPIST

## 2021-06-02 NOTE — PROGRESS NOTES
Daily Note     Today's date: 2021  Patient name: Janette Meadows  : [id number]  MRN: [id number]  Referring provider: Ag Sadler DO  Dx:   Encounter Diagnosis     ICD-10-CM    1  Other closed fracture of shaft of right femur with routine healing, subsequent encounter  S72 391D            1on1 250-320 and performed remaining TE's as part of IEP  Subjective: Pt reports making some progress with his ROM and strength  Pt notes HEP is going well  Objective: See treatment diary below        Assessment: Tolerated treatment well  R knee mobility more limited than the left  Patient would benefit from continued PT  Monitor response NV  Plan: Continue per plan of care        Precautions: ORIF R femur fracture (NWB R LE); WBAT L LE      Manuals            PROM b/l knees flex/ext  TP 15 mins                                                  Neuro Re-Ed                                                                                                        Ther Ex             Nu-step  L3x5'           Heel slides  10"x10ea b/l           Quad sets  5" x20           SAQ (assist PRN on R)  2x10 L no assist; x10 R w/assist           SLR flexion in supine (progress to on R)             LAQ (assist PRN on R)  2x10 L; x10 R w/assist           SLR X 3 in standing (R only)  2x10 ea R only           Standing HS curls (R only)  2x10 R only           Pt education+ HEP instruction TP 8 mins            Ther Activity                                       Gait Training                                       Modalities

## 2021-06-04 ENCOUNTER — OFFICE VISIT (OUTPATIENT)
Dept: PHYSICAL THERAPY | Facility: REHABILITATION | Age: 41
End: 2021-06-04
Payer: COMMERCIAL

## 2021-06-04 DIAGNOSIS — M25.562 ACUTE PAIN OF LEFT KNEE: ICD-10-CM

## 2021-06-04 DIAGNOSIS — S72.391D OTHER CLOSED FRACTURE OF SHAFT OF RIGHT FEMUR WITH ROUTINE HEALING, SUBSEQUENT ENCOUNTER: Primary | ICD-10-CM

## 2021-06-04 PROCEDURE — 97110 THERAPEUTIC EXERCISES: CPT

## 2021-06-04 PROCEDURE — 97140 MANUAL THERAPY 1/> REGIONS: CPT

## 2021-06-04 NOTE — PROGRESS NOTES
Daily Note     Today's date: 2021  Patient name: Kumar Young  :   MRN: [id number]  Referring provider: Figueroa Cramer DO  Dx:   Encounter Diagnosis     ICD-10-CM    1  Other closed fracture of shaft of right femur with routine healing, subsequent encounter  S72 603D    2  Acute pain of left knee  M25 562                  Subjective: Sore after last session but tolerable ( 2/10 )  Currently denies pain  Objective: See treatment diary below        Assessment: Tolerated treatment well  Challenged with  progressed repetitions on right LE but no issue on left  Patient would benefit from continued PT  Plan: Continue per plan of care  Precautions: ORIF R femur fracture (NWB R LE); WBAT L LE      Manuals           PROM b/l knees flex/ext  TP 15 mins CM  15 mins                                                 Neuro Re-Ed                                                                                                        Ther Ex             Nu-step  L3x5' L3  8 mins          Heel slides  10"x10ea b/l B/L  10"x10          Quad sets  5" x20 5"x20          SAQ (assist PRN on R)  2x10 L no assist; x10 R w/assist 3x10 L;  2x10 R w/ ( A )           SLR flexion in supine (progress to on R)             LAQ (assist PRN on R)  2x10 L; x10 R w/assist 3x10 L;  2x10 R with (A)          SLR X 3 in standing (R only)  2x10 ea R only 3x10 ea    R only           Standing HS curls (R only)  2x10 R only 3x10 R only          Pt education+ HEP instruction TP 8 mins            Ther Activity                                       Gait Training                                       Modalities

## 2021-06-08 ENCOUNTER — OFFICE VISIT (OUTPATIENT)
Dept: PHYSICAL THERAPY | Facility: REHABILITATION | Age: 41
End: 2021-06-08
Payer: COMMERCIAL

## 2021-06-08 DIAGNOSIS — M25.562 ACUTE PAIN OF LEFT KNEE: ICD-10-CM

## 2021-06-08 DIAGNOSIS — S72.391D OTHER CLOSED FRACTURE OF SHAFT OF RIGHT FEMUR WITH ROUTINE HEALING, SUBSEQUENT ENCOUNTER: Primary | ICD-10-CM

## 2021-06-08 PROCEDURE — 97140 MANUAL THERAPY 1/> REGIONS: CPT

## 2021-06-08 PROCEDURE — 97110 THERAPEUTIC EXERCISES: CPT

## 2021-06-08 NOTE — PROGRESS NOTES
Daily Note     Today's date: 2021  Patient name: Gabriela Valdivia  :   MRN: [id number]  Referring provider: Leida Rivera DO  Dx:   Encounter Diagnosis     ICD-10-CM    1  Other closed fracture of shaft of right femur with routine healing, subsequent encounter  S72 536D    2  Acute pain of left knee  M25 562                   Subjective: Pt reported R > L knee pain; flexion worse then extension  Objective: See treatment diary below      Assessment: Tolerated treatment well  Patient demonstrated fatigue post treatment and exhibited good technique with therapeutic exercises  VC's needed for correct technique throughout session  R discomfort with flexion  Continues to need assistance with exercises on R  Plan: Continue per plan of care  Precautions: ORIF R femur fracture (NWB R LE); WBAT L LE      Manuals          PROM b/l knees flex/ext  TP 15 mins CM  15 mins TC 15 mins                                                Neuro Re-Ed                                                                                                        Ther Ex             Nu-step  L3x5' L3  8 mins L3 x10'         Heel slides  10"x10ea b/l B/L  10"x10 B/l 10"x10         Quad sets  5" x20 5"x20 5"x20         SAQ (assist PRN on R)  2x10 L no assist; x10 R w/assist 3x10 L;  2x10 R w/ ( A )  3x10 L  2x10 R w/assist         SLR flexion in supine (progress to on R)             LAQ (assist PRN on R)  2x10 L; x10 R w/assist 3x10 L;  2x10 R with (A) 3x10 L  2x10 R w/assist         SLR X 3 in standing (R only)  2x10 ea R only 3x10 ea  R only  3x10 ea   R only         Standing HS curls (R only)  2x10 R only 3x10 R only 3x10 R only         Pt education+ HEP instruction TP 8 mins            Ther Activity                                       Gait Training                                       Modalities

## 2021-06-11 ENCOUNTER — OFFICE VISIT (OUTPATIENT)
Dept: PHYSICAL THERAPY | Facility: REHABILITATION | Age: 41
End: 2021-06-11
Payer: COMMERCIAL

## 2021-06-11 DIAGNOSIS — S72.391D OTHER CLOSED FRACTURE OF SHAFT OF RIGHT FEMUR WITH ROUTINE HEALING, SUBSEQUENT ENCOUNTER: Primary | ICD-10-CM

## 2021-06-11 DIAGNOSIS — M25.562 ACUTE PAIN OF LEFT KNEE: ICD-10-CM

## 2021-06-11 PROCEDURE — 97110 THERAPEUTIC EXERCISES: CPT | Performed by: PHYSICAL THERAPIST

## 2021-06-11 PROCEDURE — 97140 MANUAL THERAPY 1/> REGIONS: CPT | Performed by: PHYSICAL THERAPIST

## 2021-06-11 NOTE — PROGRESS NOTES
Daily Note     Today's date: 2021  Patient name: Jet Mccarthy  :   MRN: [id number]  Referring provider: Lopez Muñiz DO  Dx:   Encounter Diagnosis     ICD-10-CM    1  Other closed fracture of shaft of right femur with routine healing, subsequent encounter  S73 452D    2  Acute pain of left knee  M25 562    Patient is treated by Erinn Olson SPT under direct supervision of Mykel Gaitan, PT  Subjective: Pt reports he is feeling a little better today  Pt entered clinic with walker and was non-compliant with NWB on R  Pt reports he completes his HEP every day  Objective: See treatment diary below  PROM - R knee flex: 80, L knee flex: 103      Assessment: Tolerated treatment well  Pt educated on NWB status for the R LE, but continues to place weight through it t/o session  Pt exhibited increased R knee pain with Darling during session, especially with assisted LAQ requiring multiple breaks  Pt reports some increases in pain post session, but feels ok  Pt displayed improvements in passive knee flexion since his evaluation  Patient demonstrated fatigue post treatment and would benefit from continued PT      Plan: Continue per plan of care  Progress treatment as tolerated         Precautions: ORIF R femur fracture (NWB R LE); WBAT L LE      Manuals         PROM b/l knees flex/ext  TP 15 mins CM  15 mins TC 15 mins PS 15'                                               Neuro Re-Ed                                                                                                        Ther Ex             Nu-step  L3x5' L3  8 mins L3 x10' L3x10'        Heel slides  10"x10ea b/l B/L  10"x10 B/l 10"x10 B/l 10"x10        Quad sets  5" x20 5"x20 5"x20 5"x20        SAQ (assist PRN on R)  2x10 L no assist; x10 R w/assist 3x10 L;  2x10 R w/ ( A )  3x10 L  2x10 R w/assist 3x10 L 2x10 R w/assist        SLR flexion in supine (progress to on R)             LAQ (assist PRN on R) 2x10 L; x10 R w/assist 3x10 L;  2x10 R with (A) 3x10 L  2x10 R w/assist 3x10 L  2x10 R w/assist        SLR X 3 in standing (R only)  2x10 ea R only 3x10 ea  R only  3x10 ea   R only 3x10 ea R only        Standing HS curls (R only)  2x10 R only 3x10 R only 3x10 R only 3x10 R only        Pt education+ HEP instruction TP 8 mins            Ther Activity                                       Gait Training                                       Modalities

## 2021-06-14 ENCOUNTER — OFFICE VISIT (OUTPATIENT)
Dept: PHYSICAL THERAPY | Facility: REHABILITATION | Age: 41
End: 2021-06-14
Payer: COMMERCIAL

## 2021-06-14 DIAGNOSIS — S72.391D OTHER CLOSED FRACTURE OF SHAFT OF RIGHT FEMUR WITH ROUTINE HEALING, SUBSEQUENT ENCOUNTER: Primary | ICD-10-CM

## 2021-06-14 DIAGNOSIS — M25.562 ACUTE PAIN OF LEFT KNEE: ICD-10-CM

## 2021-06-14 PROCEDURE — 97110 THERAPEUTIC EXERCISES: CPT | Performed by: PHYSICAL THERAPIST

## 2021-06-14 PROCEDURE — 97140 MANUAL THERAPY 1/> REGIONS: CPT | Performed by: PHYSICAL THERAPIST

## 2021-06-14 NOTE — PROGRESS NOTES
Daily Note     Today's date: 2021  Patient name: Radhika Brothers  : [id number]  MRN: [id number]  Referring provider: Lexi Vernon DO  Dx:   Encounter Diagnosis     ICD-10-CM    1  Other closed fracture of shaft of right femur with routine healing, subsequent encounter  S73 982D    2  Acute pain of left knee  M25 562    Patient is treated by Milka Zamora SPT under direct supervision of Mykel Gaitan, PT  Subjective: Pt reports he feels about the same with some soreness in his R LE  Objective: See treatment diary below  FOTO score recorded this session      Assessment: Tolerated treatment well  Pt demonstrated reduced pain with LAQ/SAQ exercises compared to previous session, but still requires assistance and is unable to lift leg off table during SAQ  Pt exhibits pain with PROM and soreness post session  Patient would benefit from continued PT      Plan: Continue per plan of care  Progress treatment as tolerated  Precautions: ORIF R femur fracture (NWB R LE); WBAT L LE      Manuals        PROM b/l knees flex/ext  TP 15 mins CM  15 mins TC 15 mins PS 15' PS 15'                                              Neuro Re-Ed                                                                                                        Ther Ex             Nu-step  L3x5' L3  8 mins L3 x10' L3x10' L4x10'       Heel slides  10"x10ea b/l B/L  10"x10 B/l 10"x10 B/l 10"x10 B/l 10"x10       Quad sets  5" x20 5"x20 5"x20 5"x20 5"x20       SAQ (assist PRN on R)  2x10 L no assist; x10 R w/assist 3x10 L;  2x10 R w/ ( A )  3x10 L  2x10 R w/assist 3x10 L 2x10 R w/assist 3x10 L  2x10 R w/assist 3" hold on L      SLR flexion in supine (progress to on R)             LAQ (assist PRN on R)  2x10 L; x10 R w/assist 3x10 L;  2x10 R with (A) 3x10 L  2x10 R w/assist 3x10 L  2x10 R w/assist 3x10 L  2x10 R w/assist       SLR X 3 in standing (R only)  2x10 ea R only 3x10 ea    R only  3x10 ea  R only 3x10 ea R only 3x10 ea R only       Standing HS curls (R only)  2x10 R only 3x10 R only 3x10 R only 3x10 R only 3x10 R only       Pt education+ HEP instruction TP 8 mins            Ther Activity                                       Gait Training                                       Modalities

## 2021-06-16 ENCOUNTER — OFFICE VISIT (OUTPATIENT)
Dept: PHYSICAL THERAPY | Facility: REHABILITATION | Age: 41
End: 2021-06-16
Payer: COMMERCIAL

## 2021-06-16 ENCOUNTER — OFFICE VISIT (OUTPATIENT)
Dept: OBGYN CLINIC | Facility: HOSPITAL | Age: 41
End: 2021-06-16

## 2021-06-16 ENCOUNTER — HOSPITAL ENCOUNTER (OUTPATIENT)
Dept: RADIOLOGY | Facility: HOSPITAL | Age: 41
Discharge: HOME | End: 2021-06-16
Attending: ORTHOPAEDIC SURGERY
Payer: COMMERCIAL

## 2021-06-16 DIAGNOSIS — S72.301A CLOSED FRACTURE OF SHAFT OF RIGHT FEMUR, UNSPECIFIED FRACTURE MORPHOLOGY, INITIAL ENCOUNTER (HCC): ICD-10-CM

## 2021-06-16 DIAGNOSIS — S46.312D TRICEPS TENDON RUPTURE, LEFT, SUBSEQUENT ENCOUNTER: ICD-10-CM

## 2021-06-16 DIAGNOSIS — S72.301D CLOSED FRACTURE OF SHAFT OF RIGHT FEMUR WITH ROUTINE HEALING, UNSPECIFIED FRACTURE MORPHOLOGY, SUBSEQUENT ENCOUNTER: Primary | ICD-10-CM

## 2021-06-16 DIAGNOSIS — S72.391D OTHER CLOSED FRACTURE OF SHAFT OF RIGHT FEMUR WITH ROUTINE HEALING, SUBSEQUENT ENCOUNTER: Primary | ICD-10-CM

## 2021-06-16 DIAGNOSIS — M25.562 ACUTE PAIN OF LEFT KNEE: ICD-10-CM

## 2021-06-16 DIAGNOSIS — S81.002D: ICD-10-CM

## 2021-06-16 PROCEDURE — 97110 THERAPEUTIC EXERCISES: CPT

## 2021-06-16 PROCEDURE — 97140 MANUAL THERAPY 1/> REGIONS: CPT

## 2021-06-16 PROCEDURE — 99024 POSTOP FOLLOW-UP VISIT: CPT | Performed by: ORTHOPAEDIC SURGERY

## 2021-06-16 PROCEDURE — 73552 X-RAY EXAM OF FEMUR 2/>: CPT

## 2021-06-16 NOTE — PROGRESS NOTES
Daily Note     Today's date: 2021  Patient name: Duran Crews  : [id number]  MRN: [id number]  Referring provider: Evangelista Canchola DO  Dx:   Encounter Diagnosis     ICD-10-CM    1  Other closed fracture of shaft of right femur with routine healing, subsequent encounter  S72 441D    2  Acute pain of left knee  M25 562                   Subjective: Pt reported fluctuating pain; R knee a little more swollen today  Pt arrived ambulating with SPC  He reported MD instructed WBAT  Objective: See treatment diary below      Assessment: Tolerated treatment well  Patient demonstrated fatigue post treatment and exhibited good technique with therapeutic exercises  VC's needed for correct technique throughout session  Reviewed gait with SPC and pt to practice around the house  Review gait with SPC NV  Plan: Continue per plan of care  Precautions: ORIF R femur fracture (NWB R LE); WBAT L LE      Manuals       PROM b/l knees flex/ext  TP 15 mins CM  15 mins TC 15 mins PS 15' PS 15' TC 15'                                             Neuro Re-Ed                                                                                                        Ther Ex             Nu-step  L3x5' L3  8 mins L3 x10' L3x10' L4x10' L4 x10'      Heel slides  10"x10ea b/l B/L  10"x10 B/l 10"x10 B/l 10"x10 B/l 10"x10 B/l 10"x10      Quad sets  5" x20 5"x20 5"x20 5"x20 5"x20 5"x20      SAQ (assist PRN on R)  2x10 L no assist; x10 R w/assist 3x10 L;  2x10 R w/ ( A )  3x10 L  2x10 R w/assist 3x10 L 2x10 R w/assist 3x10 L  2x10 R w/assist 3"x30 L  2x10 R w/assist      SLR flexion in supine (progress to on R)             LAQ (assist PRN on R)  2x10 L; x10 R w/assist 3x10 L;  2x10 R with (A) 3x10 L  2x10 R w/assist 3x10 L  2x10 R w/assist 3x10 L  2x10 R w/assist 3x10 L  2x10 R w/assist      SLR X 3 in standing (R only)  2x10 ea R only 3x10 ea  R only  3x10 ea   R only 3x10 ea R only 3x10 ea R only 3x10 ea   R only      Standing HS curls (R only)  2x10 R only 3x10 R only 3x10 R only 3x10 R only 3x10 R only 3x10 R only      Pt education+ HEP instruction TP 8 mins            Ther Activity                                       Gait Training                                       Modalities

## 2021-06-16 NOTE — PROGRESS NOTES
Assessment:   S/P Insertion Nail Im Femur Retrograde - Right, Incision And Drainage (i&d) Left Knee - Left, and Repair Tendon, Triceps - Left on 4/25/2021    Plan:   Patient may be PWB on the RLE, WBAT on the LLE and LUE  Keep using walker, may transition to cane when ready  Continue physical therapy to work on ROM of the right knee  The   No work until cleared by physician  Tylenol/NSAIDs as needed for pain  Contact the office with any further questions or concerns prior to next follow-up  Follow-up in 4 weeks with repeat x-rays of the right femur  SUBJECTIVE:  Clair Ortez is a 39 y o  male who presents for 7 5 week follow up after Insertion Nail Im Femur Retrograde - Right, Incision And Drainage (i&d) Left Knee - Left, and Repair Tendon, Triceps - Left on 4/25/2021  Patient is doing well overall  He has been doing physical therapy for the RLE and working on his HEP  He is ambulating with a walker and states he has attempted to take some steps without it at home  Patient offers no additional complaints at this time  PHYSICAL EXAMINATION:  Vital signs: There were no vitals taken for this visit  General: well developed and well nourished, alert, oriented times 3 and appears comfortable  Psychiatric: Normal    MUSCULOSKELETAL EXAMINATION:    Surgical Site: Right upper leg, left knee, left elbow  Incision: Well-healed incisions  Range of Motion:   Right knee: 5 degrees shy of full extension, flexion 90 degrees   Left knee: Full flexion and extension   Left elbow: Full flexion, extension, supination and pronation  Neurovascular status: Neuro intact, good cap refill      STUDIES REVIEWED:  Imaging studies reviewed by Dr Katelin Raman and demonstrate maintained alignment of right femoral shaft fracture with signs of interval healing        PROCEDURES PERFORMED:  No Procedures performed today     Scribe Attestation    I,:  Robby Orellana am acting as a scribe while in the presence of the attending physician :       I,:  Magalis Vences DO personally performed the services described in this documentation    as scribed in my presence :

## 2021-06-21 ENCOUNTER — OFFICE VISIT (OUTPATIENT)
Dept: PHYSICAL THERAPY | Facility: REHABILITATION | Age: 41
End: 2021-06-21
Payer: COMMERCIAL

## 2021-06-21 DIAGNOSIS — M25.562 ACUTE PAIN OF LEFT KNEE: ICD-10-CM

## 2021-06-21 DIAGNOSIS — S72.391D OTHER CLOSED FRACTURE OF SHAFT OF RIGHT FEMUR WITH ROUTINE HEALING, SUBSEQUENT ENCOUNTER: Primary | ICD-10-CM

## 2021-06-21 PROCEDURE — 97110 THERAPEUTIC EXERCISES: CPT | Performed by: PHYSICAL THERAPIST

## 2021-06-21 PROCEDURE — 97140 MANUAL THERAPY 1/> REGIONS: CPT | Performed by: PHYSICAL THERAPIST

## 2021-06-21 NOTE — PROGRESS NOTES
Daily Note     Today's date: 2021  Patient name: Duran Crews  :   MRN: [id number]  Referring provider: Evangelista Canchola DO  Dx:   Encounter Diagnosis     ICD-10-CM    1  Other closed fracture of shaft of right femur with routine healing, subsequent encounter  S72 391D    2  Acute pain of left knee  M25 562    Patient is treated by HANNA Johnston under direct supervision of Mykel Gaitan, PT  Subjective: Pt reports his R knee is bending better but now he is experiencing some R hip pain  Objective: See treatment diary below  PROM: L knee ext: 0 deg, L knee flex: 105 deg, R knee ext: 3 deg R knee flex: 80 deg      Assessment: Tolerated treatment well  Pt demonstrated improved quad activation and strength and was able to extend his leg to 50% of full extension before requiring assist  Pt still exhibits difficulty with eccentric quad control  Pt displayed correct use of SPC when ambulating in clinic today  Pt reported some pain with PROM  Patient demonstrated fatigue post treatment and would benefit from continued PT      Plan: Continue per plan of care  Progress treatment as tolerated  Assess tibiofemoral mobility to determine need for mobilizations        Precautions: ORIF R femur fracture (NWB R LE); WBAT L LE      Manuals      PROM b/l knees flex/ext  TP 15 mins CM  15 mins TC 15 mins PS 15' PS 15' TC 15' PS 15'                                            Neuro Re-Ed                                                                                                        Ther Ex             Nu-step  L3x5' L3  8 mins L3 x10' L3x10' L4x10' L4 x10' L4x10'     Heel slides  10"x10ea b/l B/L  10"x10 B/l 10"x10 B/l 10"x10 B/l 10"x10 B/l 10"x10 B/l 10"x10     Quad sets  5" x20 5"x20 5"x20 5"x20 5"x20 5"x20 5"x25     SAQ (assist PRN on R)  2x10 L no assist; x10 R w/assist 3x10 L;  2x10 R w/ ( A )  3x10 L  2x10 R w/assist 3x10 L 2x10 R w/assist 3x10 L  2x10 R w/assist 3"x30 L  2x10 R w/assist 3"x30 L  2x10 R w/assist     SLR flexion in supine (progress to on R)             LAQ (assist PRN on R)  2x10 L; x10 R w/assist 3x10 L;  2x10 R with (A) 3x10 L  2x10 R w/assist 3x10 L  2x10 R w/assist 3x10 L  2x10 R w/assist 3x10 L  2x10 R w/assist 3x10 L  2x10 R w/assist     SLR X 3 in standing (R only)  2x10 ea R only 3x10 ea  R only  3x10 ea  R only 3x10 ea R only 3x10 ea R only 3x10 ea   R only 3x10 ea R only     Standing HS curls (R only)  2x10 R only 3x10 R only 3x10 R only 3x10 R only 3x10 R only 3x10 R only 3x10 R only      Pt education+ HEP instruction TP 8 mins            Seated knee flexion with L LE assist        NV                  Ther Activity                                       Gait Training                                       Modalities

## 2021-06-23 ENCOUNTER — OFFICE VISIT (OUTPATIENT)
Dept: PHYSICAL THERAPY | Facility: REHABILITATION | Age: 41
End: 2021-06-23
Payer: COMMERCIAL

## 2021-06-23 DIAGNOSIS — M25.562 ACUTE PAIN OF LEFT KNEE: ICD-10-CM

## 2021-06-23 DIAGNOSIS — S72.391D OTHER CLOSED FRACTURE OF SHAFT OF RIGHT FEMUR WITH ROUTINE HEALING, SUBSEQUENT ENCOUNTER: Primary | ICD-10-CM

## 2021-06-23 PROCEDURE — 97110 THERAPEUTIC EXERCISES: CPT

## 2021-06-23 PROCEDURE — 97140 MANUAL THERAPY 1/> REGIONS: CPT

## 2021-06-23 NOTE — PROGRESS NOTES
Daily Note     Today's date: 2021  Patient name: Carie Del Angel  : [id number]  MRN: [id number]  Referring provider: Lenin House DO  Dx:   Encounter Diagnosis     ICD-10-CM    1  Other closed fracture of shaft of right femur with routine healing, subsequent encounter  S72 808D    2  Acute pain of left knee  M25 562                   Subjective: Pt reported feeling a little better today  He reports being compliant with HEP  Some swelling persists on R knee  Improved gait with SPC  Objective: See treatment diary below      Assessment: Tolerated treatment well  Patient demonstrated fatigue post treatment and exhibited good technique with therapeutic exercises  VC's needed for correct technique throughout session  Discomfort at end range of PROM flexion especially along the R        Plan: Continue per plan of care  Md appt      Precautions: ORIF R femur fracture (NWB R LE); WBAT L LE      Manuals     PROM b/l knees flex/ext CM  15 mins TC 15 mins PS 15' PS 15' TC 15' PS 15' TC 15'                                     Neuro Re-Ed                                                                                        Ther Ex           Nu-step L3  8 mins L3 x10' L3x10' L4x10' L4 x10' L4x10' L4x10'    Heel slides B/L  10"x10 B/l 10"x10 B/l 10"x10 B/l 10"x10 B/l 10"x10 B/l 10"x10 B/l 10"x10    Quad sets 5"x20 5"x20 5"x20 5"x20 5"x20 5"x25 5"x25    SAQ (assist PRN on R) 3x10 L;  2x10 R w/ ( A )  3x10 L  2x10 R w/assist 3x10 L 2x10 R w/assist 3x10 L  2x10 R w/assist 3"x30 L  2x10 R w/assist 3"x30 L  2x10 R w/assist 3"x30 L  10 no assist 10 assist R    SLR flexion in supine (progress to on R)           LAQ (assist PRN on R) 3x10 L;  2x10 R with (A) 3x10 L  2x10 R w/assist 3x10 L  2x10 R w/assist 3x10 L  2x10 R w/assist 3x10 L  2x10 R w/assist 3x10 L  2x10 R w/assist 3x10 L  2x10 R    SLR X 3 in standing (R only) 3x10 ea  R only  3x10 ea   R only 3x10 ea R only 3x10 ea R only 3x10 ea  R only 3x10 ea R only 3x10 ea   R only    Standing HS curls (R only) 3x10 R only 3x10 R only 3x10 R only 3x10 R only 3x10 R only 3x10 R only  3x10 R only    Pt education+ HEP instruction           Seated knee flexion with L LE assist      NV 3"x10               Ther Activity                                 Gait Training                                 Modalities
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
## 2021-06-29 ENCOUNTER — OFFICE VISIT (OUTPATIENT)
Dept: PHYSICAL THERAPY | Facility: REHABILITATION | Age: 41
End: 2021-06-29
Payer: COMMERCIAL

## 2021-06-29 DIAGNOSIS — S72.391D OTHER CLOSED FRACTURE OF SHAFT OF RIGHT FEMUR WITH ROUTINE HEALING, SUBSEQUENT ENCOUNTER: Primary | ICD-10-CM

## 2021-06-29 DIAGNOSIS — M25.562 ACUTE PAIN OF LEFT KNEE: ICD-10-CM

## 2021-06-29 PROCEDURE — 97110 THERAPEUTIC EXERCISES: CPT

## 2021-06-29 PROCEDURE — 97140 MANUAL THERAPY 1/> REGIONS: CPT

## 2021-06-29 NOTE — PROGRESS NOTES
Daily Note     Today's date: 2021  Patient name: Lupe Pina  :   MRN: [id number]  Referring provider: Sherrell Hayes DO  Dx:   Encounter Diagnosis     ICD-10-CM    1  Other closed fracture of shaft of right femur with routine healing, subsequent encounter  S79 386D    2  Acute pain of left knee  M25 562                   Subjective: Pt reported feeling discomfort R > L  No new or increased symptoms  MD appt       Objective: See treatment diary below      Assessment: Tolerated treatment well  Patient demonstrated fatigue post treatment and exhibited good technique with therapeutic exercises  VC's needed for correct technique throughout session  Discomfort in R with PROM flexion and extension end range and L PROM flexion end range  Plan: Continue per plan of care  1  with PTA for 40 mins     Precautions: ORIF R femur fracture (NWB R LE); WBAT L LE      Manuals    PROM b/l knees flex/ext TC 15 mins PS 15' PS 15' TC 15' PS 15' TC 15' TC 15'                                 Neuro Re-Ed                                                                                Ther Ex          Nu-step L3 x10' L3x10' L4x10' L4 x10' L4x10' L4x10' L4x10'   Heel slides B/l 10"x10 B/l 10"x10 B/l 10"x10 B/l 10"x10 B/l 10"x10 B/l 10"x10 B/l 10"x10   Quad sets 5"x20 5"x20 5"x20 5"x20 5"x25 5"x25 5"x30   SAQ (assist PRN on R) 3x10 L  2x10 R w/assist 3x10 L 2x10 R w/assist 3x10 L  2x10 R w/assist 3"x30 L  2x10 R w/assist 3"x30 L  2x10 R w/assist 3"x30 L  10 no assist 10 assist R 3"x30 L   10 no assist 10 assist   SLR flexion in supine (progress to on R)       NV   LAQ (assist PRN on R) 3x10 L  2x10 R w/assist 3x10 L  2x10 R w/assist 3x10 L  2x10 R w/assist 3x10 L  2x10 R w/assist 3x10 L  2x10 R w/assist 3x10 L  2x10 R 3x10 L  2x10 R   SLR X 3 in standing (R only) 3x10 ea  R only 3x10 ea R only 3x10 ea R only 3x10 ea  R only 3x10 ea R only 3x10 ea  R only 3x10 ea   R only   Standing HS curls (R only) 3x10 R only 3x10 R only 3x10 R only 3x10 R only 3x10 R only  3x10 R only 3x10 R only   Pt education+ HEP instruction          Seated knee flexion with L LE assist     NV 3"x10 3"x10             Ther Activity                              Gait Training                              Modalities

## 2021-07-01 ENCOUNTER — OFFICE VISIT (OUTPATIENT)
Dept: PHYSICAL THERAPY | Facility: REHABILITATION | Age: 41
End: 2021-07-01
Payer: COMMERCIAL

## 2021-07-01 DIAGNOSIS — S72.391D OTHER CLOSED FRACTURE OF SHAFT OF RIGHT FEMUR WITH ROUTINE HEALING, SUBSEQUENT ENCOUNTER: Primary | ICD-10-CM

## 2021-07-01 DIAGNOSIS — M25.562 ACUTE PAIN OF LEFT KNEE: ICD-10-CM

## 2021-07-01 PROCEDURE — 97110 THERAPEUTIC EXERCISES: CPT

## 2021-07-01 PROCEDURE — 97140 MANUAL THERAPY 1/> REGIONS: CPT

## 2021-07-01 PROCEDURE — 97112 NEUROMUSCULAR REEDUCATION: CPT

## 2021-07-01 NOTE — PROGRESS NOTES
Daily Note     Today's date: 2021  Patient name: Sukhjinder Billingsley  : [birth year]  MRN: [id number]  Referring provider: Amber Rodgers DO  Dx:   Encounter Diagnosis     ICD-10-CM    1  Other closed fracture of shaft of right femur with routine healing, subsequent encounter  S77 286D    2  Acute pain of left knee  M25 562                   Subjective: Pt reports no changes since lv, no pain pre-tx  He reports increased WB tolerance on RLE during gait  Objective: See treatment diary below      Assessment: Tolerated treatment well  Patient demonstrated fatigue post treatment and exhibited good technique with therapeutic exercises  Continues to demonstrate painful end range flex > ext with RLE, no discomfort with LLE PROM  Progressed R LAQ without assist, able to progress to supine SLR with good tolerance  Good quad contraction with all activity today  Plan: Continue per plan of care  Precautions: ORIF R femur fracture (NWB R LE); WBAT L LE      Manuals    PROM b/l knees flex/ext SC  12'    PS 15' TC 15' TC 15'                                 Neuro Re-Ed                                                                                Ther Ex          Nu-step L5 10'    L4x10' L4x10' L4x10'   Heel slides B/l  10"x10    B/l 10"x10 B/l 10"x10 B/l 10"x10   Quad sets 5"x30    5"x25 5"x25 5"x30   SAQ (assist PRN on R) 3"x30 L 10 no assist  R assist    3"x30 L  2x10 R w/assist 3"x30 L  10 no assist 10 assist R 3"x30 L   10 no assist 10 assist   SLR flexion in supine (progress to on R) 1x10 R assist  2x10 L      NV   LAQ (assist PRN on R) 3x10 L  3x10 R    3x10 L  2x10 R w/assist 3x10 L  2x10 R 3x10 L  2x10 R   SLR X 3 in standing (R only) 3x10 ea R only    3x10 ea R only 3x10 ea  R only 3x10 ea   R only   Standing HS curls (R only) 3x10 R only    3x10 R only  3x10 R only 3x10 R only   Pt education+ HEP instruction          Seated knee flexion with L LE assist 3"x10    NV 3"x10 3"x10 Ther Activity                              Gait Training                              Modalities

## 2021-07-06 ENCOUNTER — OFFICE VISIT (OUTPATIENT)
Dept: PHYSICAL THERAPY | Facility: REHABILITATION | Age: 41
End: 2021-07-06
Payer: COMMERCIAL

## 2021-07-06 DIAGNOSIS — M25.562 ACUTE PAIN OF LEFT KNEE: ICD-10-CM

## 2021-07-06 DIAGNOSIS — S72.391D OTHER CLOSED FRACTURE OF SHAFT OF RIGHT FEMUR WITH ROUTINE HEALING, SUBSEQUENT ENCOUNTER: Primary | ICD-10-CM

## 2021-07-06 PROCEDURE — 97140 MANUAL THERAPY 1/> REGIONS: CPT

## 2021-07-06 PROCEDURE — 97110 THERAPEUTIC EXERCISES: CPT

## 2021-07-06 NOTE — PROGRESS NOTES
Daily Note     Today's date: 2021  Patient name: Arthur Christopher  : [birth year]  MRN: [id number]  Referring provider: Cara Hill DO  Dx:   Encounter Diagnosis     ICD-10-CM    1  Other closed fracture of shaft of right femur with routine healing, subsequent encounter  S71 634D    2  Acute pain of left knee  M25 562                   Subjective: Pt reported feeling okay today  He has been compliant with HEP  Objective: See treatment diary below      Assessment: Tolerated treatment well  Patient demonstrated fatigue post treatment and exhibited good technique with therapeutic exercises  Some exercises still challenging on the R side but pt is improving  Also improvement noted with PROM  Plan: Continue per plan of care  Precautions: ORIF R femur fracture (NWB R LE); WBAT L LE      Manuals      PROM b/l knees flex/ext PS 15' TC 15' TC 15' SC 12' TC 10'                                   Neuro Re-Ed                                                  Ther Ex          Nu-step L4x10' L4x10' L4x10' L5x10' L5x10'     Heel slides B/l 10"x10 B/l 10"x10 B/l 10"x10 B/l 10"x10 B/l 10"x10     Quad sets 5"x25 5"x25 5"x30 5"x30 5"x30     SAQ (assist PRN on R) 3"x30 L  2x10 R w/assist 3"x30 L  10 no assist 10 assist R 3"x30 L   10 no assist 10 assist 3"x30 L  10 no assist 10 assist R 3"x30 L  10 no assist 10 assist R     SLR flexion in supine (progress to on R)   NV 10 R assist  20x L 20 L  10 w/assist R     LAQ (assist PRN on R) 3x10 L  2x10 R w/assist 3x10 L  2x10 R 3x10 L  2x10 R 3x10 L  3x10 R 3x10 L   2x10 R w/assist       SLR X 3 in standing (R only) 3x10 ea R only 3x10 ea  R only 3x10 ea  R only 3x10 ea  R only 3x10 ea   R only     Standing HS curls (R only) 3x10 R only  3x10 R only 3x10 R only 3x10 R only 3x10 R only     Pt education+ HEP instruction          Seated knee flexion with L LE assist NV 3"x10 3"x10 3"x10 3"x10               Ther Activity Gait Training                              Modalities

## 2021-07-08 ENCOUNTER — EVALUATION (OUTPATIENT)
Dept: PHYSICAL THERAPY | Facility: REHABILITATION | Age: 41
End: 2021-07-08
Payer: COMMERCIAL

## 2021-07-08 DIAGNOSIS — S72.391D OTHER CLOSED FRACTURE OF SHAFT OF RIGHT FEMUR WITH ROUTINE HEALING, SUBSEQUENT ENCOUNTER: Primary | ICD-10-CM

## 2021-07-08 DIAGNOSIS — M25.562 ACUTE PAIN OF LEFT KNEE: ICD-10-CM

## 2021-07-08 PROCEDURE — 97110 THERAPEUTIC EXERCISES: CPT | Performed by: PHYSICAL THERAPIST
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
## 2021-07-13 ENCOUNTER — OFFICE VISIT (OUTPATIENT)
Dept: PHYSICAL THERAPY | Facility: REHABILITATION | Age: 41
End: 2021-07-13
Payer: COMMERCIAL

## 2021-07-13 DIAGNOSIS — M25.562 ACUTE PAIN OF LEFT KNEE: ICD-10-CM

## 2021-07-13 DIAGNOSIS — S72.391D OTHER CLOSED FRACTURE OF SHAFT OF RIGHT FEMUR WITH ROUTINE HEALING, SUBSEQUENT ENCOUNTER: Primary | ICD-10-CM

## 2021-07-13 PROCEDURE — 97110 THERAPEUTIC EXERCISES: CPT

## 2021-07-13 PROCEDURE — 97140 MANUAL THERAPY 1/> REGIONS: CPT

## 2021-07-13 NOTE — PROGRESS NOTES
Daily Note     Today's date: 2021  Patient name: Katia Cano  : [birth year]  MRN: [id number]  Referring provider: Erin Mena DO  Dx:   Encounter Diagnosis     ICD-10-CM    1  Other closed fracture of shaft of right femur with routine healing, subsequent encounter  S79 184D    2  Acute pain of left knee  M25 562                   Subjective: Pt reported discomfort with flexion but overall feels he is improving  Objective: See treatment diary below      Assessment: Tolerated treatment well  Patient demonstrated fatigue post treatment and exhibited good technique with therapeutic exercises  VC's needed for correct technique throughout session  Good tolerance with new exercises  Discomfort persists at end range of PROM flexion  Continue to progress as tolerated  Plan: Continue per plan of care  MD appt tomorrow 1 on  with PTA for 50 mins     Precautions: ORIF R femur fracture (NWB R LE); WBAT L LE        Manuals   7   PROM b/l knees flex/ext PS 15' TC 15' TC 15' SC 12' TC 10'  focus on flex b/l NV  TC 10'    B/l post tibial-femoral mobs grade 3-4            NV  FB 5'                                       Neuro Re-Ed                  Biodex RC                  Biodex LOS                                                     Ther Ex                 Nu-step L4x10' L4x10' L4x10' L5x10' L5x10'  L5 x10'  L5x10'   Heel slides B/l 10"x10 B/l 10"x10 B/l 10"x10 B/l 10"x10 B/l 10"x10  b/l 10"x10  b/l 10"x10   Quad sets 5"x25 5"x25 5"x30 5"x30 5"x30  5"x30  5"x30   SAQ (assist PRN on R) 3"x30 L  2x10 R w/assist 3"x30 L  10 no assist 10 assist R 3"x30 L   10 no assist 10 assist 3"x30 L  10 no assist 10 assist R 3"x30 L  10 no assist 10 assist R  3"x30 L  x15 R no assist  30 L   15 R   SLR flexion in supine (progress to on R)     NV 10 R assist  20x L 20 L  10 w/assist R  NV  20 ea     LAQ (assist PRN on R) 3x10 L  2x10 R w/assist 3x10 L  2x10 R 3x10 L  2x10 R 3x10 L  3x10 R 3x10 L   2x10 R w/assist     3x10 b/l  3x10 b/l   SLR X 3 in standing (R only) 3x10 ea R only 3x10 ea  R only 3x10 ea  R only 3x10 ea  R only 3x10 ea   R only  perform b/l NV  3x10 ea  b/l   Standing HS curls (R only) 3x10 R only  3x10 R only 3x10 R only 3x10 R only 3x10 R only  perform b/l NV  3x10 ea  b/l   Pt education+ HEP instruction                 Seated knee flexion with L LE assist NV 3"x10 3"x10 3"x10 3"x10  NV  3"x15    sit to stand on foam            NV  2x10   Ther Activity                                                     Gait Training                                                     Modalities

## 2021-07-14 ENCOUNTER — HOSPITAL ENCOUNTER (OUTPATIENT)
Dept: RADIOLOGY | Facility: HOSPITAL | Age: 41
Discharge: HOME | End: 2021-07-14
Attending: ORTHOPAEDIC SURGERY
Payer: COMMERCIAL

## 2021-07-14 ENCOUNTER — OFFICE VISIT (OUTPATIENT)
Dept: OBGYN CLINIC | Facility: HOSPITAL | Age: 41
End: 2021-07-14

## 2021-07-14 VITALS
WEIGHT: 179 LBS | DIASTOLIC BLOOD PRESSURE: 75 MMHG | SYSTOLIC BLOOD PRESSURE: 128 MMHG | HEART RATE: 74 BPM | BODY MASS INDEX: 30.73 KG/M2

## 2021-07-14 DIAGNOSIS — S72.301G CLOSED FRACTURE OF SHAFT OF RIGHT FEMUR WITH DELAYED HEALING, UNSPECIFIED FRACTURE MORPHOLOGY, SUBSEQUENT ENCOUNTER: Primary | ICD-10-CM

## 2021-07-14 DIAGNOSIS — S72.301D CLOSED FRACTURE OF SHAFT OF RIGHT FEMUR WITH ROUTINE HEALING, UNSPECIFIED FRACTURE MORPHOLOGY, SUBSEQUENT ENCOUNTER: ICD-10-CM

## 2021-07-14 PROCEDURE — 99024 POSTOP FOLLOW-UP VISIT: CPT | Performed by: ORTHOPAEDIC SURGERY

## 2021-07-14 PROCEDURE — 73552 X-RAY EXAM OF FEMUR 2/>: CPT

## 2021-07-14 NOTE — PROGRESS NOTES
ASSESSMENT/PLAN:    Assessment:   39 y o  male 11 weeks s/p Insertion Nail Im Femur Retrograde - Right, Incision And Drainage (i&d) Left Knee - Left, and Repair Tendon, Triceps - Left on 4/25/2021  Some delayed union on the right femur, unfortunately not as much callus as I would hope for at 3 months out from surgery  Given that he is relatively asymptomatic and is doing well we will continue to monitor this as time goes  We will start with a bone stimulator to encourage some callus  Plan:   · Continue WBAT bilateral lower extremities with assistance of a cane   · Continue with physical therapy  · Will be ordering bone stimulator for delay union of right femur shaft  The rep was contacted and will be reaching out to the patient   · Follow up in 1 month         The above diagnosis and plan has been dicussed with the patient and caregiver  They verbalized an understanding and will follow up accordingly  _____________________________________________________  CHIEF COMPLAINT:  No chief complaint on file  SUBJECTIVE:  Marisol Kinney is a 39 y o  male who presents today 11 weeks s/p  Insertion Nail Im Femur Retrograde - Right, Incision And Drainage (i&d) Left Knee - Left, and Repair Tendon, Triceps - Left on 4/25/2021  Patient states he is doing well  He denies any pain or discomfort when walking  He is currently physical therapy and tolerated sessions well  He is using a cane for assistance when walking  PAST MEDICAL HISTORY:  History reviewed  No pertinent past medical history  PAST SURGICAL HISTORY:  Past Surgical History:   Procedure Laterality Date    APPENDECTOMY      CYST REMOVAL      ESOPHAGOGASTRODUODENOSCOPY N/A 1/22/2016    Procedure: ESOPHAGOGASTRODUODENOSCOPY (EGD); Surgeon: Pippa Andujar MD;  Location: [facility];   Service:     INCISION AND DRAINAGE OF WOUND Left 4/25/2021    Procedure: INCISION AND DRAINAGE (I&D) LEFT KNEE;  Surgeon: Christal Wall DO; Location: [facility];  Service: Orthopedics    [place] OPEN RX FEMUR FX+INTRAMED DIANE Right 4/25/2021    Procedure: INSERTION NAIL IM FEMUR RETROGRADE;  Surgeon: Ramesh Hubbard DO;  Location: [facility];  Service: Orthopedics       FAMILY HISTORY:  History reviewed  No pertinent family history  SOCIAL HISTORY:  Social History     Tobacco Use    Smoking status: Current Some Day Smoker    Smokeless tobacco: Never Used   Substance Use Topics    Alcohol use: Yes     Comment: occasional     Drug use: Not Currently       MEDICATIONS:    Current Outpatient Medications:     aspirin (ECOTRIN) 325 mg EC tablet, Take 1 tablet (325 mg total) by mouth 2 (two) times a day, Disp: 70 tablet, Rfl: 0    docusate sodium (COLACE) 100 mg capsule, Take 1 capsule (100 mg total) by mouth 2 (two) times a day, Disp: 10 capsule, Rfl: 0    methocarbamol (ROBAXIN) 500 mg tablet, Take 1 tablet (500 mg total) by mouth 4 (four) times a day for 10 doses, Disp: 40 tablet, Rfl: 0    polyethylene glycol (MIRALAX) 17 g packet, Take 17 g by mouth daily, Disp: , Rfl: 0    ALLERGIES:  No Known Allergies    REVIEW OF SYSTEMS:  ROS is negative other than that noted in the HPI  Constitutional: Negative for fatigue and fever  HENT: Negative for sore throat  Respiratory: Negative for shortness of breath  Cardiovascular: Negative for chest pain  Gastrointestinal: Negative for abdominal pain  Endocrine: Negative for cold intolerance and heat intolerance  Genitourinary: Negative for flank pain  Musculoskeletal: Negative for back pain  Skin: Negative for rash  Allergic/Immunologic: Negative for immunocompromised state  Neurological: Negative for dizziness  Psychiatric/Behavioral: Negative for agitation           _____________________________________________________  PHYSICAL EXAMINATION:  Vitals:    07/14/21 0942   BP: 128/75   Pulse: 74     General/Constitutional: NAD, well developed, well nourished  HENT: Normocephalic, atraumatic  CV: Intact distal pulses, regular rate  Resp: No respiratory distress or labored breathing  Lymphatic: No lymphadenopathy palpated  Neuro: Alert and Oriented x 3, no focal deficits  Psych: Normal mood, normal affect, normal judgement, normal behavior  Skin: Warm, dry, no rashes, no erythema      MUSCULOSKELETAL EXAMINATION:    Right lower extremity   Surgical incisions well healed   No tenderness to palpation over anterior thigh  Full ROM of the knee and hip  NVI distally         _____________________________________________________  STUDIES REVIEWED:  Imaging studies reviewed by Dr Logan Thornton and demonstrate x-ray right femur demonstrates s/o IM nailing, maintained aligment of right femoral shaft fracture with signs of interval healing       PROCEDURES PERFORMED:  Procedures  No Procedures performed today    Scribe Attestation    I,:  Alphonse Willis am acting as a scribe while in the presence of the attending physician :       I,:  Finesse Townsend DO personally performed the services described in this documentation    as scribed in my presence :

## 2021-07-15 ENCOUNTER — OFFICE VISIT (OUTPATIENT)
Dept: PHYSICAL THERAPY | Facility: REHABILITATION | Age: 41
End: 2021-07-15
Payer: COMMERCIAL

## 2021-07-15 DIAGNOSIS — M25.562 ACUTE PAIN OF LEFT KNEE: ICD-10-CM

## 2021-07-15 DIAGNOSIS — S72.391D OTHER CLOSED FRACTURE OF SHAFT OF RIGHT FEMUR WITH ROUTINE HEALING, SUBSEQUENT ENCOUNTER: Primary | ICD-10-CM

## 2021-07-15 PROCEDURE — 97140 MANUAL THERAPY 1/> REGIONS: CPT | Performed by: PHYSICAL THERAPIST

## 2021-07-15 PROCEDURE — 97110 THERAPEUTIC EXERCISES: CPT | Performed by: PHYSICAL THERAPIST

## 2021-07-15 NOTE — PROGRESS NOTES
Daily Note     Today's date: 7/15/2021  Patient name: Oli Teixeira  :   MRN: [id number]  Referring provider: Shama Mota DO  Dx:   Encounter Diagnosis     ICD-10-CM    1  Other closed fracture of shaft of right femur with routine healing, subsequent encounter  S72 391D    2  Acute pain of left knee  M25 562    Patient is treated by Angelia Johnson SPT under direct supervision of Mykel Gaitan PT  Subjective: Pt reports no new complaints  Pt explains that he had a f/u with his physician and x-rays were taken which showed delayed healing of his R femur  Objective: See treatment diary below      Assessment: Tolerated treatment well  Pt exhibits reduced use of R LE with sit to stands adjusting so his L LE is further back allowing for more weight through it  Pt continues to require L LE assist to reach full R knee extension with LAQs  Pt also displays reduced quad lag with supine straight leg raises  Pt exhibited increased irritability in his R knee with PROM today, monitor response to stretching NV  Patient demonstrated fatigue post treatment and would benefit from continued PT      Plan: Continue per plan of care  Progress treatment as tolerated         Precautions: ORIF R femur fracture (NWB R LE); WBAT L LE        Manuals 6/21 6/23 6/29 7/1 7/6  7/8  7/13 7/15   PROM b/l knees flex/ext PS 15' TC 15' TC 15' SC 12' TC 10'  focus on flex b/l NV  TC 10' PS 10'    B/l post tibial-femoral mobs grade 3-4            NV  FB 5' PS 5'                                         Neuro Re-Ed                   Biodex RC                   Biodex LOS                                                        Ther Ex                  Nu-step L4x10' L4x10' L4x10' L5x10' L5x10'  L5 x10'  L5x10' L5 x10'   Heel slides B/l 10"x10 B/l 10"x10 B/l 10"x10 B/l 10"x10 B/l 10"x10  b/l 10"x10  b/l 10"x10 B/l 10"x10   Quad sets 5"x25 5"x25 5"x30 5"x30 5"x30  5"x30  5"x30 5"x30   SAQ (assist PRN on R) 3"x30 L  2x10 R w/assist 3"x30 L  10 no assist 10 assist R 3"x30 L   10 no assist 10 assist 3"x30 L  10 no assist 10 assist R 3"x30 L  10 no assist 10 assist R  3"x30 L  x15 R no assist  30 L   15 R 30 L  15 R   SLR flexion in supine (progress to on R)     NV 10 R assist  20x L 20 L  10 w/assist R  NV  20 ea  20 ea   LAQ (assist PRN on R) 3x10 L  2x10 R w/assist 3x10 L  2x10 R 3x10 L  2x10 R 3x10 L  3x10 R 3x10 L   2x10 R w/assist     3x10 b/l  3x10 b/l 3x10 b/l   SLR X 3 in standing (R only) 3x10 ea R only 3x10 ea  R only 3x10 ea  R only 3x10 ea  R only 3x10 ea   R only  perform b/l NV  3x10 ea  b/l 3x10 ea b/l   Standing HS curls (R only) 3x10 R only  3x10 R only 3x10 R only 3x10 R only 3x10 R only  perform b/l NV  3x10 ea  b/l 3x10 ea b/l 2#   Pt education+ HEP instruction                  Seated knee flexion with L LE assist NV 3"x10 3"x10 3"x10 3"x10  NV  3"x15 3"x15    sit to stand on foam            NV  2x10 2x10   Ther Activity                                                        Gait Training                                                        Modalities

## 2021-07-20 ENCOUNTER — OFFICE VISIT (OUTPATIENT)
Dept: PHYSICAL THERAPY | Facility: REHABILITATION | Age: 41
End: 2021-07-20
Payer: COMMERCIAL

## 2021-07-20 DIAGNOSIS — S72.391D OTHER CLOSED FRACTURE OF SHAFT OF RIGHT FEMUR WITH ROUTINE HEALING, SUBSEQUENT ENCOUNTER: Primary | ICD-10-CM

## 2021-07-20 DIAGNOSIS — M25.562 ACUTE PAIN OF LEFT KNEE: ICD-10-CM

## 2021-07-20 PROCEDURE — 97112 NEUROMUSCULAR REEDUCATION: CPT

## 2021-07-20 PROCEDURE — 97140 MANUAL THERAPY 1/> REGIONS: CPT

## 2021-07-20 PROCEDURE — 97110 THERAPEUTIC EXERCISES: CPT

## 2021-07-20 NOTE — PROGRESS NOTES
Daily Note     Today's date: 2021  Patient name: Oli Teixeira  :   MRN: [id number]  Referring provider: Shama Mota DO  Dx:   Encounter Diagnosis     ICD-10-CM    1  Other closed fracture of shaft of right femur with routine healing, subsequent encounter  S71 345D    2  Acute pain of left knee  M25 562                   Subjective: Pt reported feeling okay today  Pt reported MD put on order in for a muscle stimulator for R quad  Pt to call MD due to not hearing back about unit  Objective: See treatment diary below      Assessment: Tolerated treatment well  Patient demonstrated fatigue post treatment and exhibited good technique with therapeutic exercises  VC's needed for correct technique throughout session  R LE continues to lack and a little more discomfort at end range  Plan: Continue per plan of care  1  with PTA 43 mins     Precautions: ORIF R femur fracture (NWB R LE); WBAT L LE        Manuals 6/29 7/1 7/6  7/8  7/13 7/15 7/20   PROM b/l knees flex/ext TC 15' SC 12' TC 10'  focus on flex b/l NV  TC 10' PS 10' TC 10'    B/l post tibial-femoral mobs grade 3-4        NV  FB 5' PS 5' np                                   Neuro Re-Ed                Biodex RC                Biodex LOS                                               Ther Ex               Nu-step L4x10' L5x10' L5x10'  L5 x10'  L5x10' L5 x10' L6x10'   Heel slides B/l 10"x10 B/l 10"x10 B/l 10"x10  b/l 10"x10  b/l 10"x10 B/l 10"x10 B/l 10"x10   Quad sets 5"x30 5"x30 5"x30  5"x30  5"x30 5"x30 5"x30   SAQ (assist PRN on R) 3"x30 L   10 no assist 10 assist 3"x30 L  10 no assist 10 assist R 3"x30 L  10 no assist 10 assist R  3"x30 L  x15 R no assist  30 L   15 R 30 L  15 R 30 L  15 R    SLR flexion in supine (progress to on R) NV 10 R assist  20x L 20 L  10 w/assist R  NV  20 ea  20 ea 20 ea     LAQ (assist PRN on R) 3x10 L  2x10 R 3x10 L  3x10 R 3x10 L   2x10 R w/assist     3x10 b/l  3x10 b/l 3x10 b/l 3x10 b/l SLR X 3 in standing (R only) 3x10 ea  R only 3x10 ea  R only 3x10 ea  R only  perform b/l NV  3x10 ea  b/l 3x10 ea b/l 3x10 ea  B/l 2#   Standing HS curls (R only) 3x10 R only 3x10 R only 3x10 R only  perform b/l NV  3x10 ea  b/l 3x10 ea b/l 2# 3x10 ea   B/l 2#   Pt education+ HEP instruction               Seated knee flexion with L LE assist 3"x10 3"x10 3"x10  NV  3"x15 3"x15 3"x15    sit to stand on foam        NV  2x10 2x10 2x10   Ther Activity                                               Gait Training                                               Modalities

## 2021-07-22 ENCOUNTER — OFFICE VISIT (OUTPATIENT)
Dept: PHYSICAL THERAPY | Facility: REHABILITATION | Age: 41
End: 2021-07-22
Payer: COMMERCIAL

## 2021-07-22 DIAGNOSIS — S72.391D OTHER CLOSED FRACTURE OF SHAFT OF RIGHT FEMUR WITH ROUTINE HEALING, SUBSEQUENT ENCOUNTER: Primary | ICD-10-CM

## 2021-07-22 DIAGNOSIS — M25.562 ACUTE PAIN OF LEFT KNEE: ICD-10-CM

## 2021-07-22 PROCEDURE — 97110 THERAPEUTIC EXERCISES: CPT | Performed by: PHYSICAL THERAPIST

## 2021-07-22 PROCEDURE — 97140 MANUAL THERAPY 1/> REGIONS: CPT | Performed by: PHYSICAL THERAPIST

## 2021-07-22 NOTE — PROGRESS NOTES
Daily Note     Today's date: 2021  Patient name: Cassius Pemberton  :   MRN: [id number]  Referring provider: Jamison Rothman DO  Dx:   Encounter Diagnosis     ICD-10-CM    1  Other closed fracture of shaft of right femur with routine healing, subsequent encounter  S72 987D    2  Acute pain of left knee  M25 562    Patient is treated by HANNA Srinivasan under direct supervision of Mykel Gaitan, [id number] [facility] with PT from 242 to 320 and completed rest as IEP  Subjective: Pt reports he received a call from his doctor about the bone stimulator today and may be receiving in the next few days  Pt notes he is feeling good today and has no new complaints  Objective: See treatment diary below  FOTO recorded this session (current: 56)       Assessment: Tolerated treatment well  Recumbent bike was trialed in session to work on knee ROM and pt tolerated it well, but was unable to complete full revolutions at this time, continue to use in future sessions  Pt able to achieve greater knee ext on R during LAQ requiring less assistance from the L LE  Patient demonstrated fatigue post treatment, exhibited good technique with therapeutic exercises and would benefit from continued PT      Plan: Continue per plan of care  Progress treatment as tolerated         Precautions: ORIF R femur fracture (NWB R LE); WBAT L LE        Manuals 6/29 7/1 7/6  7/8  7/13 7/15 7/20 7/22   PROM b/l knees flex/ext TC 15' SC 12' TC 10'  focus on flex b/l NV  TC 10' PS 10' TC 10' PS 8'    B/l post tibial-femoral mobs grade 3-4        NV  FB 5' PS 5' np PS 4'                                     Neuro Re-Ed                 Biodex RC                 Biodex LOS                                                  Ther Ex                Nu-step L4x10' L5x10' L5x10'  L5 x10'  L5x10' L5 x10' L6x10' R bike 10'   Heel slides B/l 10"x10 B/l 10"x10 B/l 10"x10  b/l 10"x10  b/l 10"x10 B/l 10"x10 B/l 10"x10 B/l 10'x10   Quad sets 5"x30 5"x30 5"x30  5"x30  5"x30 5"x30 5"x30 5"x30   SAQ (assist PRN on R) 3"x30 L   10 no assist 10 assist 3"x30 L  10 no assist 10 assist R 3"x30 L  10 no assist 10 assist R  3"x30 L  x15 R no assist  30 L   15 R 30 L  15 R 30 L  15 R  2# 30 L  15 R   SLR flexion in supine (progress to on R) NV 10 R assist  20x L 20 L  10 w/assist R  NV  20 ea  20 ea 20 ea  20 ea   LAQ (assist PRN on R) 3x10 L  2x10 R 3x10 L  3x10 R 3x10 L   2x10 R w/assist     3x10 b/l  3x10 b/l 3x10 b/l 3x10 b/l 3x10 b/l  2# on L   SLR X 3 in standing (R only) 3x10 ea  R only 3x10 ea  R only 3x10 ea  R only  perform b/l NV  3x10 ea  b/l 3x10 ea b/l 3x10 ea  B/l 2# 3x10 ea b/l 2#   Standing HS curls (R only) 3x10 R only 3x10 R only 3x10 R only  perform b/l NV  3x10 ea  b/l 3x10 ea b/l 2# 3x10 ea   B/l 2# 3x10 ea b/l 2#   Pt education+ HEP instruction                Seated knee flexion with L LE assist 3"x10 3"x10 3"x10  NV  3"x15 3"x15 3"x15 3"x15    sit to stand on foam        NV  2x10 2x10 2x10 2x10   Ther Activity                                                  Gait Training                                                  Modalities

## 2021-07-27 ENCOUNTER — OFFICE VISIT (OUTPATIENT)
Dept: PHYSICAL THERAPY | Facility: REHABILITATION | Age: 41
End: 2021-07-27
Payer: COMMERCIAL

## 2021-07-27 DIAGNOSIS — S72.391D OTHER CLOSED FRACTURE OF SHAFT OF RIGHT FEMUR WITH ROUTINE HEALING, SUBSEQUENT ENCOUNTER: Primary | ICD-10-CM

## 2021-07-27 DIAGNOSIS — M25.562 ACUTE PAIN OF LEFT KNEE: ICD-10-CM

## 2021-07-27 PROCEDURE — 97110 THERAPEUTIC EXERCISES: CPT

## 2021-07-27 PROCEDURE — 97140 MANUAL THERAPY 1/> REGIONS: CPT

## 2021-07-27 NOTE — PROGRESS NOTES
Daily Note     Today's date: 2021  Patient name: Mitzy Portillo  :   MRN: [id number]  Referring provider: Matthew Leo DO  Dx:   Encounter Diagnosis     ICD-10-CM    1  Other closed fracture of shaft of right femur with routine healing, subsequent encounter  C21 887E    2  Acute pain of left knee  M25 562                   Subjective: Pt reported R > L discomfort  Compliant with HEP  Objective: See treatment diary below      Assessment: Tolerated treatment well  Patient demonstrated fatigue post treatment and exhibited good technique with therapeutic exercises  VC's needed for correct technique throughout session  Discomfort at end range with R knee flexion and ext  Plan: Continue per plan of care  Precautions: ORIF R femur fracture (NWB R LE); WBAT L LE        Manuals 7/6  7/8  7/13 7/15 7/20 7/22 7/27   PROM b/l knees flex/ext TC 10'  focus on flex b/l NV  TC 10' PS 10' TC 10' PS 8' TC 10'    B/l post tibial-femoral mobs grade 3-4    NV  FB 5' PS 5' np PS 4' np                               Neuro Re-Ed              Biodex RC              Biodex LOS                                         Ther Ex             Nu-step L5x10'  L5 x10'  L5x10' L5 x10' L6x10' R bike 10' R bike 10'   Heel slides B/l 10"x10  b/l 10"x10  b/l 10"x10 B/l 10"x10 B/l 10"x10 B/l 10'x10 B/l 10"x10   Quad sets 5"x30  5"x30  5"x30 5"x30 5"x30 5"x30 5"x30   SAQ (assist PRN on R) 3"x30 L  10 no assist 10 assist R  3"x30 L  x15 R no assist  30 L   15 R 30 L  15 R 30 L  15 R  2# 30 L  15 R 2#   30 on L  15 on R   SLR flexion in supine (progress to on R) 20 L  10 w/assist R  NV  20 ea  20 ea 20 ea  20 ea 20 ea  LAQ (assist PRN on R) 3x10 L   2x10 R w/assist     3x10 b/l  3x10 b/l 3x10 b/l 3x10 b/l 3x10 b/l  2# on L 3x10 b/l 2# on L   SLR X 3 in standing (R only) 3x10 ea  R only  perform b/l NV  3x10 ea  b/l 3x10 ea b/l 3x10 ea  B/l 2# 3x10 ea b/l 2# 3x10 ea   B/l 2#   Standing HS curls (R only) 3x10 R only  perform b/l NV  3x10 ea  b/l 3x10 ea b/l 2# 3x10 ea  B/l 2# 3x10 ea b/l 2# 3x10 ea   B/l 2#   Pt education+ HEP instruction             Seated knee flexion with L LE assist 3"x10  NV  3"x15 3"x15 3"x15 3"x15 3"x15    sit to stand on foam    NV  2x10 2x10 2x10 2x10 2x10   Ther Activity                                         Gait Training                                         Modalities

## 2021-07-29 ENCOUNTER — OFFICE VISIT (OUTPATIENT)
Dept: PHYSICAL THERAPY | Facility: REHABILITATION | Age: 41
End: 2021-07-29
Payer: COMMERCIAL

## 2021-07-29 DIAGNOSIS — S72.391D OTHER CLOSED FRACTURE OF SHAFT OF RIGHT FEMUR WITH ROUTINE HEALING, SUBSEQUENT ENCOUNTER: Primary | ICD-10-CM

## 2021-07-29 DIAGNOSIS — M25.562 ACUTE PAIN OF LEFT KNEE: ICD-10-CM

## 2021-07-29 PROCEDURE — 97110 THERAPEUTIC EXERCISES: CPT | Performed by: PHYSICAL THERAPIST

## 2021-07-29 PROCEDURE — 97140 MANUAL THERAPY 1/> REGIONS: CPT | Performed by: PHYSICAL THERAPIST

## 2021-07-29 NOTE — PROGRESS NOTES
Daily Note     Today's date: 2021  Patient name: Lencho Krishna  :   MRN: [id number]  Referring provider: Shannon Wilcox DO  Dx:   Encounter Diagnosis     ICD-10-CM    1  Other closed fracture of shaft of right femur with routine healing, subsequent encounter  S72 391D    2  Acute pain of left knee  M25 562               1on1 w/PT, -330 and w/PT, -345  Subjective: Pt reports he has received his bone stimulator over the weekend  Objective: See treatment diary below      Assessment: Tolerated treatment well  VC's with SLR flexion in supine on right to avoid quad lag  ROM deficits persist primarily with R knee flexion  Pt experiences R knee pain with knee flexion ROM  Patient would benefit from continued PT      Plan: Continue per plan of care  Progress treatment as tolerated  Precautions: ORIF R femur fracture (NWB R LE); WBAT L LE        Manuals 7/6  7/8  7/13 7/15 7/20 7/22 7/27 7/29   PROM b/l knees flex/ext TC 10'  focus on flex b/l NV  TC 10' PS 10' TC 10' PS 8' TC 10' TP 10'    B/l post tibial-femoral mobs grade 3-4    NV  FB 5' PS 5' np PS 4' np Seated R tib/fem distraction with post glide TP 3'    GISTM R distal quads           Trial TP 2 mins                  Neuro Re-Ed               Biodex RC               Biodex LOS                                            Ther Ex              Nu-step L5x10'  L5 x10'  L5x10' L5 x10' L6x10' R bike 10' R bike 10' R bike x10'   Heel slides B/l 10"x10  b/l 10"x10  b/l 10"x10 B/l 10"x10 B/l 10"x10 B/l 10'x10 B/l 10"x10 10"x10 b/l   Quad sets 5"x30  5"x30  5"x30 5"x30 5"x30 5"x30 5"x30 5"x30   SAQ (assist PRN on R) 3"x30 L  10 no assist 10 assist R  3"x30 L  x15 R no assist  30 L   15 R 30 L  15 R 30 L  15 R  2# 30 L  15 R 2#   30 on L  15 on R 2# 30 on L , 2#15 on R   SLR flexion in supine (progress to on R) 20 L  10 w/assist R  NV  20 ea  20 ea 20 ea  20 ea 20 ea   20ea   LAQ (assist PRN on R) 3x10 L   2x10 R w/assist     3x10 b/l  3x10 b/l 3x10 b/l 3x10 b/l 3x10 b/l  2# on L 3x10 b/l 2# on L 3x10 b/l 2# on L   SLR X 3 in standing (R only) 3x10 ea  R only  perform b/l NV  3x10 ea  b/l 3x10 ea b/l 3x10 ea  B/l 2# 3x10 ea b/l 2# 3x10 ea  B/l 2# 3x10ea b/l 2#   Standing HS curls (R only) 3x10 R only  perform b/l NV  3x10 ea  b/l 3x10 ea b/l 2# 3x10 ea  B/l 2# 3x10 ea b/l 2# 3x10 ea   B/l 2# 3x10 ea b/l 2#   Pt education+ HEP instruction              Seated knee flexion with L LE assist 3"x10  NV  3"x15 3"x15 3"x15 3"x15 3"x15 3"x20    sit to stand on foam    NV  2x10 2x10 2x10 2x10 2x10 2x10              Ther Activity               Fwd step ups               Mini squats           x10   Gait Training                                            Modalities

## 2021-08-03 ENCOUNTER — OFFICE VISIT (OUTPATIENT)
Dept: PHYSICAL THERAPY | Facility: REHABILITATION | Age: 41
End: 2021-08-03
Payer: COMMERCIAL

## 2021-08-03 DIAGNOSIS — M25.562 ACUTE PAIN OF LEFT KNEE: ICD-10-CM

## 2021-08-03 DIAGNOSIS — S72.391D OTHER CLOSED FRACTURE OF SHAFT OF RIGHT FEMUR WITH ROUTINE HEALING, SUBSEQUENT ENCOUNTER: Primary | ICD-10-CM

## 2021-08-03 PROCEDURE — 97110 THERAPEUTIC EXERCISES: CPT

## 2021-08-03 PROCEDURE — 97140 MANUAL THERAPY 1/> REGIONS: CPT

## 2021-08-03 NOTE — PROGRESS NOTES
Daily Note     Today's date: 8/3/2021  Patient name: Janice Dan  :   MRN: [id number]  Referring provider: Ange Lino DO  Dx:   Encounter Diagnosis     ICD-10-CM    1  Other closed fracture of shaft of right femur with routine healing, subsequent encounter  S78 489D    2  Acute pain of left knee  M25 562                   Subjective: Pt reported feeling a little better over the weekend  R > L knee discomfort  MD appt       Objective: See treatment diary below      Assessment: Tolerated treatment well  Patient demonstrated fatigue post treatment and exhibited good technique with therapeutic exercises  VC's needed for correct technique throughout session  Discomfort noted at end range of PROM  Discomfort noted with GISTM but he noted relief LV  Plan: Continue per plan of care  Precautions: ORIF R femur fracture (NWB R LE); WBAT L LE        Manuals  7/13 7/15 7/20 7/22 7/27 7/29 8/3   PROM b/l knees flex/ext  TC 10' PS 10' TC 10' PS 8' TC 10' TP 10' TC 10'    B/l post tibial-femoral mobs grade 3-4  FB 5' PS 5' np PS 4' np Seated R tib/fem distraction with post glide TP 3' np    GISTM R distal quads       Trial TP 2 mins TC 5'               Neuro Re-Ed            Biodex RC            Biodex LOS                                   Ther Ex           Nu-step  L5x10' L5 x10' L6x10' R bike 10' R bike 10' R bike x10' R bike x10'   Heel slides  b/l 10"x10 B/l 10"x10 B/l 10"x10 B/l 10'x10 B/l 10"x10 10"x10 b/l 10"x10 b/l   Quad sets  5"x30 5"x30 5"x30 5"x30 5"x30 5"x30 5"x30   SAQ (assist PRN on R)  30 L   15 R 30 L  15 R 30 L  15 R  2# 30 L  15 R 2#   30 on L  15 on R 2# 30 on L , 2#15 on R 2# x30 on L;  15 on R no weight   SLR flexion in supine (progress to on R)  20 ea  20 ea 20 ea  20 ea 20 ea  20ea 20 ea     LAQ (assist PRN on R)  3x10 b/l 3x10 b/l 3x10 b/l 3x10 b/l  2# on L 3x10 b/l 2# on L 3x10 b/l 2# on L 3x10 b/l 2# on L   SLR X 3 in standing (R only)  3x10 ea  b/l 3x10 ea b/l 3x10 ea  B/l 2# 3x10 ea b/l 2# 3x10 ea  B/l 2# 3x10ea b/l 2# 3x10 ea  B/l 2#   Standing HS curls (R only)  3x10 ea  b/l 3x10 ea b/l 2# 3x10 ea  B/l 2# 3x10 ea b/l 2# 3x10 ea  B/l 2# 3x10 ea b/l 2# 3x10 ea   B/l 2#   Pt education+ HEP instruction           Seated knee flexion with L LE assist  3"x15 3"x15 3"x15 3"x15 3"x15 3"x20 3"x20    sit to stand on foam  2x10 2x10 2x10 2x10 2x10 2x10 2x10             Ther Activity            Fwd step ups            Mini squats       x10 10   Gait Training                                   Modalities

## 2021-08-05 ENCOUNTER — EVALUATION (OUTPATIENT)
Dept: PHYSICAL THERAPY | Facility: REHABILITATION | Age: 41
End: 2021-08-05
Payer: COMMERCIAL

## 2021-08-05 DIAGNOSIS — S72.391D OTHER CLOSED FRACTURE OF SHAFT OF RIGHT FEMUR WITH ROUTINE HEALING, SUBSEQUENT ENCOUNTER: Primary | ICD-10-CM

## 2021-08-05 DIAGNOSIS — M25.562 ACUTE PAIN OF LEFT KNEE: ICD-10-CM

## 2021-08-05 PROCEDURE — 97140 MANUAL THERAPY 1/> REGIONS: CPT | Performed by: PHYSICAL THERAPIST

## 2021-08-05 PROCEDURE — 97110 THERAPEUTIC EXERCISES: CPT | Performed by: PHYSICAL THERAPIST

## 2021-08-05 NOTE — PROGRESS NOTES
PT Re-Evaluation     Today's date: 2021  Patient name: Cassius Pemberton  : 89  MRN: [id number]  Referring provider: Jamison Rothman DO  Dx:   Encounter Diagnosis     ICD-10-CM    1  Other closed fracture of shaft of right femur with routine healing, subsequent encounter  S72 391D    2  Acute pain of left knee  M25 562         Updated measurements and functional status taken this session  Assessment  Assessment details: Pt continues to demonstrate improvement in knee ROM, LE strength, and function  However, pt continues to present with intermittent elevated right knee pain levels, decreased R knee flexion mobility, decreased LE strength (R>L) and decreased function/activity tolerance  Pt would benefit from continued skilled PT intervention to address these issues and to maximize function  Pt may also benefit from a dynamic splinting device to assist with regaining right knee flexion  Pt is scheduled to f/u with MD  and will then be out of town to have dental work done  Please advise  Thank you      Impairments: abnormal gait, abnormal or restricted ROM, activity intolerance, impaired balance, impaired physical strength, pain with function and weight-bearing intolerance  Understanding of Dx/Px/POC: good   Prognosis: good    Goals  Short Term:  Pt will report decreased levels of pain by at least 2 subjective ratings in 4 weeks - partially met (less frequent pain)  Pt will demonstrate improved ROM by at least 10 degrees in 4 weeks - met  Pt will demonstrate improved strength by 1/2 grade MMT in 4 weeks - met  Long Term:   Pt will be independent in their HEP in 8 weeks-partially met  Pt will demonstrate improved FOTO, > predicted level - partially met (current: 56; projected: 60)  Pt will be independent with all ADL's - partially met  Pt will perform stairs in reciprocal pattern - not met (step to)  Pt will return to work at prior level of function - not met    Plan  Plan details: Patient was educated in [employer]  All questions were answered to pt's satisfaction  Patient would benefit from: skilled physical therapy  Planned therapy interventions: manual therapy, joint mobilization, balance, neuromuscular re-education, patient education, therapeutic exercise, therapeutic activities, gait training, graded exercise, home exercise program and flexibility  Frequency: 2x week  Duration in weeks: 8  Plan of Care beginning date: 2021  Plan of Care expiration date: 2021  Treatment plan discussed with: patient        Subjective Evaluation    History of Present Illness  Mechanism of injury: Pt reports pain/difficulty persists with ambulation, steps (step to pattern), bending the right knee  Pt continues to use SPC with ambulation, otherwise he experiences right knee pain and antalgic gait  Pt notes improvement with donning shoes and driving, although is unable to drive stick shift  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)      Pain  At best pain ratin  At worst pain ratin  Location: right knee    Treatments  Current treatment: physical therapy  Patient Goals  Patient goals for therapy: decreased pain, increased motion, improved balance, increased strength, independence with ADLs/IADLs, return to work and return to sport/leisure activities          Objective     Active Range of Motion   Left Knee   Flexion: 116 degrees with pain  Extension: 0 degrees     Right Knee   Flexion: 90 degrees with pain  Extension: 2 degrees     Passive Range of Motion   Left Knee   Flexion: 118 degrees with pain  Extension: 0 degrees     Right Knee   Flexion: 91 degrees with pain  Extension: 0 degrees     Strength/Myotome Testing     Left Hip   Planes of Motion   Flexion: 5  Extension: 4+  Abduction: 5  Adduction: 4+    Right Hip   Planes of Motion   Flexion: 4+  Extension: 4+  Abduction: 4  Adduction: 4    Left Knee   Flexion: 4+  Extension: 4+    Right Knee   Flexion: 4  Extension: 4- (pain)             Precautions: ORIF R femur fracture (NWB R LE); WBAT L LE        Manuals  7/13 7/15 7/20 7/22 7/27 7/29 8/3 8/5   PROM b/l knees flex/ext  TC 10' PS 10' TC 10' PS 8' TC 10' TP 10' TC 10' TP 12'     B/l post tibial-femoral mobs grade 3-4  FB 5' PS 5' np PS 4' np Seated R tib/fem distraction with post glide TP 3' np TP 2'    GISTM R distal quads           Trial TP 2 mins TC 5' TP 5'                      Neuro Re-Ed                   Biodex RC                   Biodex LOS                                                        Ther Ex                  Nu-step  L5x10' L5 x10' L6x10' R bike 10' R bike 10' R bike x10' R bike x10' R bike x10'   Heel slides  b/l 10"x10 B/l 10"x10 B/l 10"x10 B/l 10'x10 B/l 10"x10 10"x10 b/l 10"x10 b/l 10"x10 b/l   Quad sets  5"x30 5"x30 5"x30 5"x30 5"x30 5"x30 5"x30 np   SAQ (assist PRN on R)  30 L   15 R 30 L  15 R 30 L  15 R  2# 30 L  15 R 2#   30 on L  15 on R 2# 30 on L , 2#15 on R 2# x30 on L;  15 on R no weight 2#x30 on L; 20 on R no weight   SLR flexion in supine (progress to on R)  20 ea  20 ea 20 ea  20 ea 20 ea  20ea 20 ea  20ea   LAQ (assist PRN on R)  3x10 b/l 3x10 b/l 3x10 b/l 3x10 b/l  2# on L 3x10 b/l 2# on L 3x10 b/l 2# on L 3x10 b/l 2# on L nv   SLR X 3 in standing (R only)  3x10 ea  b/l 3x10 ea b/l 3x10 ea  B/l 2# 3x10 ea b/l 2# 3x10 ea  B/l 2# 3x10ea b/l 2# 3x10 ea  B/l 2# nv   Standing HS curls (R only)  3x10 ea  b/l 3x10 ea b/l 2# 3x10 ea  B/l 2# 3x10 ea b/l 2# 3x10 ea  B/l 2# 3x10 ea b/l 2# 3x10 ea   B/l 2# nv   Pt education+ HEP instruction                  Seated knee flexion with L LE assist  3"x15 3"x15 3"x15 3"x15 3"x15 3"x20 3"x20 nv    sit to stand on foam  2x10 2x10 2x10 2x10 2x10 2x10 2x10 nv                      Ther Activity                   Fwd step ups                   Mini squats           x10 10 nv   Gait Training                                                        Modalities

## 2021-08-11 ENCOUNTER — OFFICE VISIT (OUTPATIENT)
Dept: OBGYN CLINIC | Facility: HOSPITAL | Age: 41
End: 2021-08-11
Payer: COMMERCIAL

## 2021-08-11 ENCOUNTER — HOSPITAL ENCOUNTER (OUTPATIENT)
Dept: RADIOLOGY | Facility: HOSPITAL | Age: 41
Discharge: HOME | End: 2021-08-11
Payer: COMMERCIAL

## 2021-08-11 VITALS
BODY MASS INDEX: 29.91 KG/M2 | DIASTOLIC BLOOD PRESSURE: 77 MMHG | WEIGHT: 175.2 LBS | SYSTOLIC BLOOD PRESSURE: 115 MMHG | HEART RATE: 81 BPM | HEIGHT: 64 IN

## 2021-08-11 DIAGNOSIS — S72.301D CLOSED FRACTURE OF SHAFT OF RIGHT FEMUR WITH ROUTINE HEALING, UNSPECIFIED FRACTURE MORPHOLOGY, SUBSEQUENT ENCOUNTER: Primary | ICD-10-CM

## 2021-08-11 DIAGNOSIS — S81.002D: ICD-10-CM

## 2021-08-11 DIAGNOSIS — S46.312D TRICEPS TENDON RUPTURE, LEFT, SUBSEQUENT ENCOUNTER: ICD-10-CM

## 2021-08-11 DIAGNOSIS — S72.301D CLOSED FRACTURE OF SHAFT OF RIGHT FEMUR WITH ROUTINE HEALING, UNSPECIFIED FRACTURE MORPHOLOGY, SUBSEQUENT ENCOUNTER: ICD-10-CM

## 2021-08-11 PROCEDURE — 99213 OFFICE O/P EST LOW 20 MIN: CPT | Performed by: ORTHOPAEDIC SURGERY

## 2021-08-11 PROCEDURE — 73552 X-RAY EXAM OF FEMUR 2/>: CPT

## 2021-08-11 NOTE — PROGRESS NOTES
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 [employer]  Follow up: 6 weeks with repeat xrays of right femur    The above diagnosis and plan has been dicussed with the patient and caregiver  They verbalized an understanding and will follow up accordingly  _____________________________________________________    SUBJECTIVE:  Neela Frost is a 39 y o  male who presents for follow up of the above surgery  He is able to ambulate with a cane without pain  He does have pain in the knee when ambulating without the cane  He has been using the bone stimulator  PAST MEDICAL HISTORY:  History reviewed  No pertinent past medical history  PAST SURGICAL HISTORY:  Past Surgical History:   Procedure Laterality Date    APPENDECTOMY      CYST REMOVAL      ESOPHAGOGASTRODUODENOSCOPY N/A 1/22/2016    Procedure: ESOPHAGOGASTRODUODENOSCOPY (EGD); Surgeon: Sarahy Medrano MD;  Location: [facility]; Service:     INCISION AND DRAINAGE OF WOUND Left 4/25/2021    Procedure: INCISION AND DRAINAGE (I&D) LEFT KNEE;  Surgeon: Chuck Loaiza DO;  Location: [facility];  Service: Orthopedics    AK OPEN RX FEMUR FX+INTRAMED DIANE Right 4/25/2021    Procedure: INSERTION NAIL IM FEMUR RETROGRADE;  Surgeon: Chuck Loaiza DO;  Location: [facility];  Service: Orthopedics       FAMILY HISTORY:  No family history on file      SOCIAL HISTORY:  Social History     Tobacco Use    Smoking status: Current Some Day Smoker    Smokeless tobacco: Never Used   Substance Use Topics    Alcohol use: Yes     Comment: occasional     Drug use: Not Currently       MEDICATIONS:    Current Outpatient Medications:     aspirin (ECOTRIN) 325 mg EC tablet, Take 1 tablet (325 mg total) by mouth 2 (two) times a day, Disp: 70 tablet, Rfl: 0    docusate sodium (COLACE) 100 mg capsule, Take 1 capsule (100 mg total) by mouth 2 (two) times a day, Disp: 10 capsule, Rfl: 0    methocarbamol (ROBAXIN) 500 mg tablet, Take 1 tablet (500 mg total) by mouth 4 (four) times a day for 10 doses, Disp: 40 tablet, Rfl: 0    polyethylene glycol (MIRALAX) 17 g packet, Take 17 g by mouth daily, Disp: , Rfl: 0    ALLERGIES:  No Known Allergies    REVIEW OF SYSTEMS:  ROS is negative other than that noted in the HPI  Constitutional: Negative for fatigue and fever  HENT: Negative for sore throat  Respiratory: Negative for shortness of breath  Cardiovascular: Negative for chest pain  Gastrointestinal: Negative for abdominal pain  Endocrine: Negative for cold intolerance and heat intolerance  Genitourinary: Negative for flank pain  Musculoskeletal: Negative for back pain  Skin: Negative for rash  Allergic/Immunologic: Negative for immunocompromised state  Neurological: Negative for dizziness  Psychiatric/Behavioral: Negative for agitation           _____________________________________________________  PHYSICAL EXAMINATION:  General/Constitutional: NAD, well developed, well nourished  HENT: Normocephalic, atraumatic  CV: Intact distal pulses, regular rate  Resp: No respiratory distress or labored breathing  Lymphatic: No lymphadenopathy palpated  Neuro: Alert and Oriented x 3, no focal deficits  Psych: Normal mood, normal affect, normal judgement, normal behavior  Skin: Warm, dry, no rashes, no erythema      MUSCULOSKELETAL EXAMINATION:  Musculoskeletal: Right knee       ROM:   0-90   Palpation: Effusion negative     MJL tenderness Negative     LJL tenderness Negative    Tibial Tubercle TTP Negative    Distal Femur TTP Negative   Instability: Varus stable     Valgus stable   Special Tests: Lachman Negative     Posterior drawer Negative     Anterior drawer Negative     Pivot shift not tested     Dial not tested   Patella: Palpation no tenderness     Mobility 1/4     Apprehension Negative      LE NV Exam: +2 DP/PT pulses bilaterally  Sensation intact to light touch L2-S1 bilaterally     Bilateral hip ROM demonstrates no pain actively or passively    No calf tenderness to palpation bilaterally      _____________________________________________________  STUDIES REVIEWED:  Imaging studies reviewed by Dr Nadir Acevedo and demonstrate some increased  consolidation and callus formation at fracture site on the medial cortex  Hardware is in the expected position without evidence of loosening or hardware failure        PROCEDURES PERFORMED:  No Procedures performed today

## 2021-08-30 ENCOUNTER — OFFICE VISIT (OUTPATIENT)
Dept: PHYSICAL THERAPY | Facility: REHABILITATION | Age: 41
End: 2021-08-30
Payer: COMMERCIAL

## 2021-08-30 DIAGNOSIS — S72.391D OTHER CLOSED FRACTURE OF SHAFT OF RIGHT FEMUR WITH ROUTINE HEALING, SUBSEQUENT ENCOUNTER: Primary | ICD-10-CM

## 2021-08-30 DIAGNOSIS — M25.562 ACUTE PAIN OF LEFT KNEE: ICD-10-CM

## 2021-08-30 PROCEDURE — 97110 THERAPEUTIC EXERCISES: CPT | Performed by: PHYSICAL THERAPIST

## 2021-08-30 PROCEDURE — 97140 MANUAL THERAPY 1/> REGIONS: CPT | Performed by: PHYSICAL THERAPIST

## 2021-08-30 NOTE — PROGRESS NOTES
Daily Note     Today's date: 2021  Patient name: Ja Granados  :   MRN: [id number]  Referring provider: Mary Kay Denny DO  Dx:   Encounter Diagnosis     ICD-10-CM    1  Other closed fracture of shaft of right femur with routine healing, subsequent encounter  S76 239D    2  Acute pain of left knee  M25 562                   Subjective: Patient without c/o prior to treatment session  Objective: See treatment diary below      Assessment: Patient demonstrated significant difficulty with SLR and SAQ on right LE; moderate pain with right knee PROM  Plan: Continue per plan of care  Precautions: ORIF R femur fracture (NWB R LE); WBAT L LE        Manuals 8/30 7/15 7/20 7/22 7/27 7/29 8/3 8/5   PROM b/l knees flex/ext  KK PS 10' TC 10' PS 8' TC 10' TP 10' TC 10' TP 12'     B/l post tibial-femoral mobs grade 3-4 KK PS 5' np PS 4' np Seated R tib/fem distraction with post glide TP 3' np TP 2'    GISTM R distal quads  KK         Trial TP 2 mins TC 5' TP 5'                      Neuro Re-Ed                   Biodex RC                   Biodex LOS                                                        Ther Ex                  Nu-step Bike   10 min L5 x10' L6x10' R bike 10' R bike 10' R bike x10' R bike x10' R bike x10'   Heel slides  np B/l 10"x10 B/l 10"x10 B/l 10'x10 B/l 10"x10 10"x10 b/l 10"x10 b/l 10"x10 b/l   Quad sets  np 5"x30 5"x30 5"x30 5"x30 5"x30 5"x30 np   SAQ (assist PRN on R) 2#x30 on L; 20 on R no weight 30 L  15 R 30 L  15 R  2# 30 L  15 R 2#   30 on L  15 on R 2# 30 on L , 2#15 on R 2# x30 on L;  15 on R no weight 2#x30 on L; 20 on R no weight   SLR flexion in supine (progress to on R) 20 ea  20 ea 20 ea  20 ea 20 ea  20ea 20 ea  20ea   LAQ (assist PRN on R) 3x10 b/l 2# on L 3x10 b/l 3x10 b/l 3x10 b/l  2# on L 3x10 b/l 2# on L 3x10 b/l 2# on L 3x10 b/l 2# on L nv   SLR X 3 in standing (R only)  3x10 ea  B/l 2# 3x10 ea b/l 3x10 ea  B/l 2# 3x10 ea b/l 2# 3x10 ea   B/l 2# 3x10ea b/l 2# 3x10 ea  B/l 2# nv   Standing HS curls (R only)  3x10 ea  B/l 2# 3x10 ea b/l 2# 3x10 ea  B/l 2# 3x10 ea b/l 2# 3x10 ea  B/l 2# 3x10 ea b/l 2# 3x10 ea   B/l 2# nv   Pt education+ HEP instruction                 Seated knee flexion with L LE assist 3"x20 3"x15 3"x15 3"x15 3"x15 3"x20 3"x20 nv    sit to stand on foam  2x10 2x10 2x10 2x10 2x10 2x10 2x10 nv                      Ther Activity                   Fwd step ups                   Mini squats  10x         x10 10 nv   Gait Training                                                        Modalities

## 2021-09-01 ENCOUNTER — APPOINTMENT (OUTPATIENT)
Dept: PHYSICAL THERAPY | Facility: REHABILITATION | Age: 41
End: 2021-09-01
Payer: COMMERCIAL

## 2021-09-02 ENCOUNTER — OFFICE VISIT (OUTPATIENT)
Dept: PHYSICAL THERAPY | Facility: REHABILITATION | Age: 41
End: 2021-09-02
Payer: COMMERCIAL

## 2021-09-02 DIAGNOSIS — M25.562 ACUTE PAIN OF LEFT KNEE: ICD-10-CM

## 2021-09-02 DIAGNOSIS — S72.391D OTHER CLOSED FRACTURE OF SHAFT OF RIGHT FEMUR WITH ROUTINE HEALING, SUBSEQUENT ENCOUNTER: Primary | ICD-10-CM

## 2021-09-02 PROCEDURE — 97530 THERAPEUTIC ACTIVITIES: CPT | Performed by: PHYSICAL THERAPIST

## 2021-09-02 PROCEDURE — 97140 MANUAL THERAPY 1/> REGIONS: CPT | Performed by: PHYSICAL THERAPIST

## 2021-09-02 PROCEDURE — 97110 THERAPEUTIC EXERCISES: CPT | Performed by: PHYSICAL THERAPIST

## 2021-09-02 NOTE — PROGRESS NOTES
Daily Note     Today's date: 2021  Patient name: Katia Cano  : [birth year]  MRN: [id number]  Referring provider: Erin Mena DO  Dx:   Encounter Diagnosis     ICD-10-CM    1  Other closed fracture of shaft of right femur with routine healing, subsequent encounter  S77 500D    2  Acute pain of left knee  M25 562                   Subjective: Pt reports continued progress  Pt notes less dependence on SPC with ambulation, as his R knee is not as painful  Objective: See treatment diary below      Assessment: Tolerated treatment well  Able to progress resistance with multiple TE's to good tolerance  Pt continues to experience pain at available end range with PROM knee flexion R>L  Tightness persists with R knee flexion also  Patient would benefit from continued PT      Plan: Continue per plan of care  Progress treatment as tolerated         Precautions: ORIF R femur fracture (NWB R LE); WBAT L LE        Manuals 8/30 7/15 7/20 7/22 7/27 7/29 8/3 8/5 9   PROM b/l knees flex/ext  KK PS 10' TC 10' PS 8' TC 10' TP 10' TC 10' TP 12'  TP 12'    B/l post tibial-femoral mobs grade 3-4 KK PS 5' np PS 4' np Seated R tib/fem distraction with post glide TP 3' np TP 2' TP 2'    GISTM R distal quads  KK         Trial TP 2 mins TC 5' TP 5' TP 5'                       Neuro Re-Ed                    Biodex RC                    Biodex LOS                                                           Ther Ex                   Nu-step Bike   10 min L5 x10' L6x10' R bike 10' R bike 10' R bike x10' R bike x10' R bike x10' Upright bike x10'   Heel slides  np B/l 10"x10 B/l 10"x10 B/l 10'x10 B/l 10"x10 10"x10 b/l 10"x10 b/l 10"x10 b/l np   Quad sets  np 5"x30 5"x30 5"x30 5"x30 5"x30 5"x30 np np   SAQ (assist PRN on R) 2#x30 on L; 20 on R no weight 30 L  15 R 30 L  15 R  2# 30 L  15 R 2#   30 on L  15 on R 2# 30 on L , 2#15 on R 2# x30 on L;  15 on R no weight 2#x30 on L; 20 on R no weight 3#x30 on L; x20 on R no weight   SLR flexion in supine (progress to on R) 20 ea  20 ea 20 ea  20 ea 20 ea  20ea 20 ea  20ea nv   LAQ (assist PRN on R) 3x10 b/l 2# on L 3x10 b/l 3x10 b/l 3x10 b/l  2# on L 3x10 b/l 2# on L 3x10 b/l 2# on L 3x10 b/l 2# on L nv 3#L, 2#R 3x10ea   SLR X 3 in standing (R only)  3x10 ea  B/l 2# 3x10 ea b/l 3x10 ea  B/l 2# 3x10 ea b/l 2# 3x10 ea  B/l 2# 3x10ea b/l 2# 3x10 ea  B/l 2# nv 3# 3x10ea b/l   Standing HS curls (R only)  3x10 ea  B/l 2# 3x10 ea b/l 2# 3x10 ea  B/l 2# 3x10 ea b/l 2# 3x10 ea  B/l 2# 3x10 ea b/l 2# 3x10 ea   B/l 2# nv 3# 3x10 ea b/l   Pt education+ HEP instruction                  Seated knee flexion with L LE assist 3"x20 3"x15 3"x15 3"x15 3"x15 3"x20 3"x20 nv 10"x10    sit to stand on foam  2x10 2x10 2x10 2x10 2x10 2x10 2x10 nv np                       Ther Activity                    Fwd step ups                6"x20ea    Mini squats  10x         x10 10 nv 2x10   Side stepping            Gait Training                                                           Modalities

## 2021-09-07 ENCOUNTER — OFFICE VISIT (OUTPATIENT)
Dept: PHYSICAL THERAPY | Facility: REHABILITATION | Age: 41
End: 2021-09-07
Payer: COMMERCIAL

## 2021-09-07 DIAGNOSIS — M25.562 ACUTE PAIN OF LEFT KNEE: ICD-10-CM

## 2021-09-07 DIAGNOSIS — S72.391D OTHER CLOSED FRACTURE OF SHAFT OF RIGHT FEMUR WITH ROUTINE HEALING, SUBSEQUENT ENCOUNTER: Primary | ICD-10-CM

## 2021-09-07 PROCEDURE — 97530 THERAPEUTIC ACTIVITIES: CPT

## 2021-09-07 PROCEDURE — 97110 THERAPEUTIC EXERCISES: CPT

## 2021-09-07 PROCEDURE — 97140 MANUAL THERAPY 1/> REGIONS: CPT

## 2021-09-07 NOTE — PROGRESS NOTES
Daily Note     Today's date: 2021  Patient name: Deandre Sanabria  :   MRN: [id number]  Referring provider: Janie Seth DO  Dx:   Encounter Diagnosis     ICD-10-CM    1  Other closed fracture of shaft of right femur with routine healing, subsequent encounter  S79 101D    2  Acute pain of left knee  M25 562                   Subjective: Pt reported R > L knee pain and tightness  He also noted discomfort along scars when wearing long pants  Objective: See treatment diary below      Assessment: Tolerated treatment well  Patient demonstrated fatigue post treatment and exhibited good technique with therapeutic exercises  VC's needed for correct technique throughout session  Instructed pt on desensitization for scars on R LE to perform at home  Some discomfort on R with exercises and manuals  Monitor NV  Plan: Continue per plan of care  Precautions: ORIF R femur fracture (NWB R LE); WBAT L LE        Manuals 8/30 7/29 8/3 8/5 9/2 9/7   PROM b/l knees flex/ext  KK TP 10' TC 10' TP 12'  TP 12' TC 10'    B/l post tibial-femoral mobs grade 3-4 KK Seated R tib/fem distraction with post glide TP 3' np TP 2' TP 2' IS 3'    GISTM R distal quads  KK Trial TP 2 mins TC 5' TP 5' TP 5' TC 5'                Neuro Re-Ed             Biodex RC             Biodex LOS                                      Ther Ex            Nu-step Bike   10 min R bike x10' R bike x10' R bike x10' Upright bike x10' upright bike x10'   Heel slides  np 10"x10 b/l 10"x10 b/l 10"x10 b/l np np   Quad sets  np 5"x30 5"x30 np np np   SAQ (assist PRN on R) 2#x30 on L; 20 on R no weight 2# 30 on L , 2#15 on R 2# x30 on L;  15 on R no weight 2#x30 on L; 20 on R no weight 3#x30 on L; x20 on R no weight 3#x30 on L;  x20 on R no weight   SLR flexion in supine (progress to on R) 20 ea  20ea 20 ea  20ea nv 20 ea  LAQ (assist PRN on R) 3x10 b/l 2# on L 3x10 b/l 2# on L 3x10 b/l 2# on L nv 3#L, 2#R 3x10ea 3# L,  2# R 3x10 ea  SLR X 3 in standing (R only)  3x10 ea  B/l 2# 3x10ea b/l 2# 3x10 ea  B/l 2# nv 3# 3x10ea b/l 3# 3x10 ea  b/l   Standing HS curls (R only)  3x10 ea  B/l 2# 3x10 ea b/l 2# 3x10 ea   B/l 2# nv 3# 3x10 ea b/l 3# 3x10 ea  b/l   Pt education+ HEP instruction           Seated knee flexion with L LE assist 3"x20 3"x20 3"x20 nv 10"x10 10"x10    sit to stand on foam  2x10 2x10 2x10 nv np np                Ther Activity             Fwd step ups        6"x20ea 6" x20 ea     Mini squats  10x x10 10 nv 2x10 2x10   Side stepping         Gait Training                                      Modalities

## 2021-09-09 ENCOUNTER — OFFICE VISIT (OUTPATIENT)
Dept: PHYSICAL THERAPY | Facility: REHABILITATION | Age: 41
End: 2021-09-09
Payer: COMMERCIAL

## 2021-09-09 DIAGNOSIS — S72.391D OTHER CLOSED FRACTURE OF SHAFT OF RIGHT FEMUR WITH ROUTINE HEALING, SUBSEQUENT ENCOUNTER: Primary | ICD-10-CM

## 2021-09-09 DIAGNOSIS — M25.562 ACUTE PAIN OF LEFT KNEE: ICD-10-CM

## 2021-09-09 PROCEDURE — 97530 THERAPEUTIC ACTIVITIES: CPT | Performed by: PHYSICAL THERAPIST

## 2021-09-09 PROCEDURE — 97140 MANUAL THERAPY 1/> REGIONS: CPT | Performed by: PHYSICAL THERAPIST

## 2021-09-09 PROCEDURE — 97110 THERAPEUTIC EXERCISES: CPT | Performed by: PHYSICAL THERAPIST

## 2021-09-09 NOTE — PROGRESS NOTES
Daily Note     Today's date: 2021  Patient name: Lencho Krishna  :   MRN: [id number]  Referring provider: Shannon Wilcox DO  Dx:   Encounter Diagnosis     ICD-10-CM    1  Other closed fracture of shaft of right femur with routine healing, subsequent encounter  S72 391D    2  Acute pain of left knee  M25 562         1on1 [id number]  Subjective: Pt offers no new complaints  Pt has resumed his job as a   Objective: See treatment diary below      Assessment: Tolerated treatment well  Good tolerance to progressions as charted  Pt continues to present with R knee ROM deficits, flex>ext  Patient would benefit from continued PT      Plan: Progress treatment as tolerated  Precautions: ORIF R femur fracture (NWB R LE); WBAT L LE        Manuals 8/30 7/29 8/3 8/5 9/2 9/7 9/9   PROM b/l knees flex/ext  KK TP 10' TC 10' TP 12'  TP 12' TC 10' TP 10'    B/l post tibial-femoral mobs grade 3-4 KK Seated R tib/fem distraction with post glide TP 3' np TP 2' TP 2' IS 3' TP 3'    GISTM R distal quads  KK Trial TP 2 mins TC 5' TP 5' TP 5' TC 5' TP 5'                 Neuro Re-Ed              Biodex RC              Biodex LOS                                         Ther Ex             Nu-step Bike   10 min R bike x10' R bike x10' R bike x10' Upright bike x10' upright bike x10' upright bike St8 x10   Heel slides  np 10"x10 b/l 10"x10 b/l 10"x10 b/l np np    Quad sets  np 5"x30 5"x30 np np np    SAQ (assist PRN on R) 2#x30 on L; 20 on R no weight 2# 30 on L , 2#15 on R 2# x30 on L;  15 on R no weight 2#x30 on L; 20 on R no weight 3#x30 on L; x20 on R no weight 3#x30 on L;  x20 on R no weight 3#x30 on L; x20 on R no weight   SLR flexion in supine (progress to on R) 20 ea  20ea 20 ea  20ea nv 20 ea  20ea   LAQ (assist PRN on R) 3x10 b/l 2# on L 3x10 b/l 2# on L 3x10 b/l 2# on L nv 3#L, 2#R 3x10ea 3# L,  2# R 3x10 ea  3# L, 2# R 3x10ea   SLR X 3 in standing (R only)  3x10 ea   B/l 2# 3x10ea b/l 2# 3x10 ea  B/l 2# nv 3# 3x10ea b/l 3# 3x10 ea  b/l 3# 3x10 ea b/l   Standing HS curls (R only)  3x10 ea  B/l 2# 3x10 ea b/l 2# 3x10 ea  B/l 2# nv 3# 3x10 ea b/l 3# 3x10 ea  b/l 3# 3x10 ea b/l   Pt education+ HEP instruction            Seated knee flexion with L LE assist 3"x20 3"x20 3"x20 nv 10"x10 10"x10 np    sit to stand on foam  2x10 2x10 2x10 nv np np 15#kb 2x10                 Ther Activity              Fwd step ups        6"x20ea 6" x20 ea   6"x20ea    Mini squats  10x x10 10 nv 2x10 2x10 2x10   Side stepping       otb 2 laps   Gait Training                                         Modalities

## 2021-09-15 ENCOUNTER — OFFICE VISIT (OUTPATIENT)
Dept: PHYSICAL THERAPY | Facility: REHABILITATION | Age: 41
End: 2021-09-15
Payer: COMMERCIAL

## 2021-09-15 DIAGNOSIS — M25.562 ACUTE PAIN OF LEFT KNEE: ICD-10-CM

## 2021-09-15 DIAGNOSIS — S72.391D OTHER CLOSED FRACTURE OF SHAFT OF RIGHT FEMUR WITH ROUTINE HEALING, SUBSEQUENT ENCOUNTER: Primary | ICD-10-CM

## 2021-09-15 PROCEDURE — 97530 THERAPEUTIC ACTIVITIES: CPT

## 2021-09-15 PROCEDURE — 97110 THERAPEUTIC EXERCISES: CPT

## 2021-09-15 PROCEDURE — 97140 MANUAL THERAPY 1/> REGIONS: CPT

## 2021-09-15 NOTE — PROGRESS NOTES
Daily Note     Today's date: 9/15/2021  Patient name: Ja Granados  :   MRN: [id number]  Referring provider: Mary Kay Denny DO  Dx:   Encounter Diagnosis     ICD-10-CM    1  Other closed fracture of shaft of right femur with routine healing, subsequent encounter  F55 276O    2  Acute pain of left knee  M25 562                   Subjective: Pt noted intermittent discomfort today  Objective: See treatment diary below      Assessment: Tolerated treatment well  Patient demonstrated fatigue post treatment and exhibited good technique with therapeutic exercises  VC's needed for correct technique throughout session  Discomfort along distal quad and along scar on R  Plan: Continue per plan of care  Precautions: ORIF R femur fracture (NWB R LE); WBAT L LE        Manuals 8/3 8/5 9/2 9/7 9/9 9/15   PROM b/l knees flex/ext TC 10' TP 12'  TP 12' TC 10' TP 10' TC 10'    B/l post tibial-femoral mobs grade 3-4 np TP 2' TP 2' IS 3' TP 3' TP 3'    GISTM R distal quads TC 5' TP 5' TP 5' TC 5' TP 5' TC 5'              Neuro Re-Ed           Biodex RC           Biodex LOS                                Ther Ex          Nu-step R bike x10' R bike x10' Upright bike x10' upright bike x10' upright bike St8 x10 upright bike st 8 x10'   Heel slides 10"x10 b/l 10"x10 b/l np np     Quad sets 5"x30 np np np     SAQ (assist PRN on R) 2# x30 on L;  15 on R no weight 2#x30 on L; 20 on R no weight 3#x30 on L; x20 on R no weight 3#x30 on L;  x20 on R no weight 3#x30 on L; x20 on R no weight 3# x30 on L;  20 on R no weight   SLR flexion in supine (progress to on R) 20 ea  20ea nv 20 ea  20ea 20 ea  LAQ (assist PRN on R) 3x10 b/l 2# on L nv 3#L, 2#R 3x10ea 3# L,  2# R 3x10 ea  3# L, 2# R 3x10ea 3# L, 2# R 3x10 ea  SLR X 3 in standing (R only) 3x10 ea  B/l 2# nv 3# 3x10ea b/l 3# 3x10 ea  b/l 3# 3x10 ea b/l 3# 3x10 ea  b/l   Standing HS curls (R only) 3x10 ea   B/l 2# nv 3# 3x10 ea b/l 3# 3x10 ea  b/l 3# 3x10 ea b/l 3# 3x10 ea  b/l   Pt education+ HEP instruction          Seated knee flexion with L LE assist 3"x20 nv 10"x10 10"x10 np np    sit to stand on foam 2x10 nv np np 15#kb 2x10 15# KB 2x10              Ther Activity           Fwd step ups    6"x20ea 6" x20 ea   6"x20ea 6"x20 ea     Mini squats 10 nv 2x10 2x10 2x10 2x10   Side stepping     otb 2 laps otb x3 laps   Gait Training                                Modalities

## 2021-09-20 ENCOUNTER — OFFICE VISIT (OUTPATIENT)
Dept: PHYSICAL THERAPY | Facility: REHABILITATION | Age: 41
End: 2021-09-20
Payer: COMMERCIAL

## 2021-09-20 DIAGNOSIS — M25.562 ACUTE PAIN OF LEFT KNEE: ICD-10-CM

## 2021-09-20 DIAGNOSIS — S72.391D OTHER CLOSED FRACTURE OF SHAFT OF RIGHT FEMUR WITH ROUTINE HEALING, SUBSEQUENT ENCOUNTER: Primary | ICD-10-CM

## 2021-09-20 PROCEDURE — 97110 THERAPEUTIC EXERCISES: CPT | Performed by: PHYSICAL THERAPIST

## 2021-09-20 PROCEDURE — 97140 MANUAL THERAPY 1/> REGIONS: CPT | Performed by: PHYSICAL THERAPIST

## 2021-09-20 NOTE — PROGRESS NOTES
Daily Note     Today's date: 2021  Patient name: Mariah Diaz  : [birth year]  MRN: [id number]  Referring provider: Sedrick Barnes DO  Dx:   Encounter Diagnosis     ICD-10-CM    1  Other closed fracture of shaft of right femur with routine healing, subsequent encounter  S73 337D    2  Acute pain of left knee  M25 562                   Subjective: Pt reports he has been performing work duties to good tolerance  Objective: See treatment diary below  FOTO recorded this session=66 with goal of 60  Assessment: Tolerated treatment well  Pt noted some R anterior knee pain with SLR flexion in supine; improved with verbal cues to perform quad contraction first   R knee flexion ROM remains limited and painful  Patient would benefit from continued PT      Plan: Progress treatment as tolerated  Pt is scheduled to f/u with MD   Precautions: ORIF R femur fracture (NWB R LE); WBAT L LE        Manuals 8/3 8/5 9/2 9/7 9/9 9/15 9/20   PROM b/l knees flex/ext TC 10' TP 12'  TP 12' TC 10' TP 10' TC 10' TP 10'    B/l post tibial-femoral mobs grade 3-4 np TP 2' TP 2' IS 3' TP 3' TP 3' TP 2' R ida    GISTM R distal quads TC 5' TP 5' TP 5' TC 5' TP 5' TC 5' TP 5'               Neuro Re-Ed            Biodex RC            Biodex LOS                                   Ther Ex           Nu-step R bike x10' R bike x10' Upright bike x10' upright bike x10' upright bike St8 x10 upright bike st 8 x10' upright bike St 8x10'   Heel slides 10"x10 b/l 10"x10 b/l np np      Quad sets 5"x30 np np np      SAQ (assist PRN on R) 2# x30 on L;  15 on R no weight 2#x30 on L; 20 on R no weight 3#x30 on L; x20 on R no weight 3#x30 on L;  x20 on R no weight 3#x30 on L; x20 on R no weight 3# x30 on L;  20 on R no weight 3# x30 on L; 0# x30 on R   SLR flexion in supine (progress to on R) 20 ea  20ea nv 20 ea  20ea 20 ea  20ea   LAQ (assist PRN on R) 3x10 b/l 2# on L nv 3#L, 2#R 3x10ea 3# L,  2# R 3x10 ea   3# L, 2# R 3x10ea 3# L, 2# R 3x10 ea  3# L, 2# R 3x10 ea   SLR X 3 in standing (R only) 3x10 ea  B/l 2# nv 3# 3x10ea b/l 3# 3x10 ea  b/l 3# 3x10 ea b/l 3# 3x10 ea  b/l 3# 3x10 ea b/l   Standing HS curls (R only) 3x10 ea  B/l 2# nv 3# 3x10 ea b/l 3# 3x10 ea  b/l 3# 3x10 ea b/l 3# 3x10 ea  b/l 3# 3x10 ea b/l   Pt education+ HEP instruction           Seated knee flexion with L LE assist 3"x20 nv 10"x10 10"x10 np np     sit to stand on foam 2x10 nv np np 15#kb 2x10 15# KB 2x10 15#kb 2x10               Ther Activity            Fwd step ups    6"x20ea 6" x20 ea  6"x20ea 6"x20 ea   6"x20ea    Mini squats 10 nv 2x10 2x10 2x10 2x10 2x15   Side stepping     otb 2 laps otb x3 laps otb 3 laps   Gait Training                                   Modalities

## 2021-09-23 ENCOUNTER — APPOINTMENT (OUTPATIENT)
Dept: PHYSICAL THERAPY | Facility: REHABILITATION | Age: 41
End: 2021-09-23
Payer: COMMERCIAL

## 2021-09-23 ENCOUNTER — HOSPITAL ENCOUNTER (OUTPATIENT)
Dept: RADIOLOGY | Facility: HOSPITAL | Age: 41
Discharge: HOME | End: 2021-09-23
Attending: ORTHOPAEDIC SURGERY
Payer: COMMERCIAL

## 2021-09-23 ENCOUNTER — OFFICE VISIT (OUTPATIENT)
Dept: OBGYN CLINIC | Facility: HOSPITAL | Age: 41
End: 2021-09-23
Payer: COMMERCIAL

## 2021-09-23 VITALS
BODY MASS INDEX: 29.88 KG/M2 | WEIGHT: 175 LBS | DIASTOLIC BLOOD PRESSURE: 73 MMHG | HEART RATE: 79 BPM | HEIGHT: 64 IN | SYSTOLIC BLOOD PRESSURE: 108 MMHG

## 2021-09-23 DIAGNOSIS — S72.301D CLOSED FRACTURE OF SHAFT OF RIGHT FEMUR WITH ROUTINE HEALING, UNSPECIFIED FRACTURE MORPHOLOGY, SUBSEQUENT ENCOUNTER: ICD-10-CM

## 2021-09-23 DIAGNOSIS — S72.301D CLOSED FRACTURE OF SHAFT OF RIGHT FEMUR WITH ROUTINE HEALING, UNSPECIFIED FRACTURE MORPHOLOGY, SUBSEQUENT ENCOUNTER: Primary | ICD-10-CM

## 2021-09-23 PROCEDURE — 73552 X-RAY EXAM OF FEMUR 2/>: CPT

## 2021-09-23 PROCEDURE — 99213 OFFICE O/P EST LOW 20 MIN: CPT | Performed by: ORTHOPAEDIC SURGERY

## 2021-09-23 NOTE — PROGRESS NOTES
Assessment:   S/P Insertion Nail Im Femur Retrograde - Right, Incision And Drainage (i&d) Left Knee - Left, and Repair Tendon, Triceps - Left on 4/25/2021  Painful hardware    Plan:   Slow but continued healing of Pete's right femoral shaft fracture status post retrograde nail  He is  Experiencing some knee pain at both the site of nail insertion as well as over the distal femoral screws on the lateral aspect of the knee  He will continue working as tolerated  He should use a knee sleeve if this gives him some comfort  He will follow up in another 3 months with repeat x-rays of his right femur  SUBJECTIVE:  Arthur Christopher is a 39 y o  male who presents for 5 month follow up after Insertion Nail Im Femur Retrograde - Right, Incision And Drainage (i&d) Left Knee - Left, and Repair Tendon, Triceps - Left on 4/25/2021  He is back to work  He has been having anterior knee pain with wt bearing activities but this has not affected his function  He ambulates with a cane  PHYSICAL EXAMINATION:  Vital signs: /73   Pulse 79   Ht 5' 4" (1 626 m)   Wt 79 4 kg (175 lb)   BMI 30 04 kg/m²   General: well developed and well nourished, alert, oriented times 3 and appears comfortable  Psychiatric: Normal    MUSCULOSKELETAL EXAMINATION:    Surgical Site: right knee  Incision: Clean, dry, intact and Tenderness over the distal screws  Range of Motion: As expected  Neurovascular status: Neuro intact, good cap refill   no tenderness over the fracture site  No deformity, full range of motion of the knee   walks well with a very mild limp    STUDIES REVIEWED:  Imaging studies reviewed by Dr Ilya Kiser and demonstrate mild interval healing present at fracture site    No migration of hardware      PROCEDURES PERFORMED:    No Procedures performed today

## 2021-09-27 ENCOUNTER — OFFICE VISIT (OUTPATIENT)
Dept: PHYSICAL THERAPY | Facility: REHABILITATION | Age: 41
End: 2021-09-27
Payer: COMMERCIAL

## 2021-09-27 DIAGNOSIS — M25.562 ACUTE PAIN OF LEFT KNEE: ICD-10-CM

## 2021-09-27 DIAGNOSIS — S72.391D OTHER CLOSED FRACTURE OF SHAFT OF RIGHT FEMUR WITH ROUTINE HEALING, SUBSEQUENT ENCOUNTER: Primary | ICD-10-CM

## 2021-09-27 PROCEDURE — 97530 THERAPEUTIC ACTIVITIES: CPT | Performed by: PHYSICAL THERAPIST

## 2021-09-27 PROCEDURE — 97140 MANUAL THERAPY 1/> REGIONS: CPT | Performed by: PHYSICAL THERAPIST

## 2021-09-27 PROCEDURE — 97110 THERAPEUTIC EXERCISES: CPT | Performed by: PHYSICAL THERAPIST

## 2021-09-27 NOTE — PROGRESS NOTES
Daily Note     Today's date: 2021  Patient name: Patty Manning  : [birth year]  MRN: [id number]  Referring provider: Noé Quezada DO  Dx:   Encounter Diagnosis     ICD-10-CM    1  Other closed fracture of shaft of right femur with routine healing, subsequent encounter  S72 391D    2  Acute pain of left knee  M25 562            1on1 [id number] and performed remaining TE's as part of IEP  Subjective: Pt reports he saw his Surgeon and he states his fracture has healed  Pt states he was instructed to continue PT and f/u again with him in 3 months  Pt mentioning the Doctor wants to remove some hardware in R LE in a year  Objective: See treatment diary below      Assessment: Tolerated treatment well  R knee flexion remains primary ROm limitation  Improved post manuals  Patient would benefit from continued PT      Plan: Continue per plan of care        Precautions: ORIF R femur fracture (NWB R LE); WBAT L LE        Manuals 8/3 8/5 9/2 9/7 9/9 9/15 9/20 9/27    PROM b/l knees flex/ext TC 10' TP 12'  TP 12' TC 10' TP 10' TC 10' TP 10' TP 10' R only    B/l post tibial-femoral mobs grade 3-4 np TP 2' TP 2' IS 3' TP 3' TP 3' TP 2' R ida TP 2' R only supine and seated seated    GISTM R distal quads TC 5' TP 5' TP 5' TC 5' TP 5' TC 5' TP 5' TP5'                  Neuro Re-Ed              Biodex RC              Biodex LOS                                         Ther Ex             Nu-step R bike x10' R bike x10' Upright bike x10' upright bike x10' upright bike St8 x10 upright bike st 8 x10' upright bike St 8x10' upright bike ST8 x10'    Heel slides 10"x10 b/l 10"x10 b/l np np        Quad sets 5"x30 np np np        SAQ (assist PRN on R) 2# x30 on L;  15 on R no weight 2#x30 on L; 20 on R no weight 3#x30 on L; x20 on R no weight 3#x30 on L;  x20 on R no weight 3#x30 on L; x20 on R no weight 3# x30 on L;  20 on R no weight 3# x30 on L; 0# x30 on R 3# L; 0# R x30 ea    SLR flexion in supine (progress to on R) 20 ea  20ea nv 20 ea  20ea 20 ea  20ea np    LAQ (assist PRN on R) 3x10 b/l 2# on L nv 3#L, 2#R 3x10ea 3# L,  2# R 3x10 ea  3# L, 2# R 3x10ea 3# L, 2# R 3x10 ea  3# L, 2# R 3x10 ea 3# L, 2# R 3x10ea    SLR X 3 in standing (R only) 3x10 ea  B/l 2# nv 3# 3x10ea b/l 3# 3x10 ea  b/l 3# 3x10 ea b/l 3# 3x10 ea  b/l 3# 3x10 ea b/l 3# 3x10 ea b/l    Standing HS curls (R only) 3x10 ea  B/l 2# nv 3# 3x10 ea b/l 3# 3x10 ea  b/l 3# 3x10 ea b/l 3# 3x10 ea  b/l 3# 3x10 ea b/l 3# 3x10 ea b/l    Pt education+ HEP instruction             Seated knee flexion with L LE assist 3"x20 nv 10"x10 10"x10 np np       sit to stand on foam 2x10 nv np np 15#kb 2x10 15# KB 2x10 15#kb 2x10 15#kb 2x10                  Ther Activity              Fwd step ups    6"x20ea 6" x20 ea  6"x20ea 6"x20 ea   6"x20ea 6"x20ea     Mini squats 10 nv 2x10 2x10 2x10 2x10 2x15 2x15    Side stepping     otb 2 laps otb x3 laps otb 3 laps otb 3 laps    Gait Training                                         Modalities

## 2021-09-29 ENCOUNTER — APPOINTMENT (OUTPATIENT)
Dept: PHYSICAL THERAPY | Facility: REHABILITATION | Age: 41
End: 2021-09-29
Payer: COMMERCIAL

## 2021-10-06 ENCOUNTER — EVALUATION (OUTPATIENT)
Dept: PHYSICAL THERAPY | Facility: REHABILITATION | Age: 41
End: 2021-10-06
Payer: COMMERCIAL

## 2021-10-06 DIAGNOSIS — S72.391D OTHER CLOSED FRACTURE OF SHAFT OF RIGHT FEMUR WITH ROUTINE HEALING, SUBSEQUENT ENCOUNTER: Primary | ICD-10-CM

## 2021-10-06 DIAGNOSIS — M25.562 ACUTE PAIN OF LEFT KNEE: ICD-10-CM

## 2021-10-06 PROCEDURE — 97110 THERAPEUTIC EXERCISES: CPT | Performed by: PHYSICAL THERAPIST

## 2021-10-13 ENCOUNTER — OFFICE VISIT (OUTPATIENT)
Dept: PHYSICAL THERAPY | Facility: REHABILITATION | Age: 41
End: 2021-10-13
Payer: COMMERCIAL

## 2021-10-13 DIAGNOSIS — M25.562 ACUTE PAIN OF LEFT KNEE: ICD-10-CM

## 2021-10-13 DIAGNOSIS — S72.391D OTHER CLOSED FRACTURE OF SHAFT OF RIGHT FEMUR WITH ROUTINE HEALING, SUBSEQUENT ENCOUNTER: Primary | ICD-10-CM

## 2021-10-13 PROCEDURE — 97140 MANUAL THERAPY 1/> REGIONS: CPT

## 2021-10-13 PROCEDURE — 97110 THERAPEUTIC EXERCISES: CPT

## 2021-10-14 ENCOUNTER — OFFICE VISIT (OUTPATIENT)
Dept: PHYSICAL THERAPY | Facility: REHABILITATION | Age: 41
End: 2021-10-14
Payer: COMMERCIAL

## 2021-10-14 DIAGNOSIS — M25.562 ACUTE PAIN OF LEFT KNEE: ICD-10-CM

## 2021-10-14 DIAGNOSIS — S72.391D OTHER CLOSED FRACTURE OF SHAFT OF RIGHT FEMUR WITH ROUTINE HEALING, SUBSEQUENT ENCOUNTER: Primary | ICD-10-CM

## 2021-10-14 PROCEDURE — 97112 NEUROMUSCULAR REEDUCATION: CPT | Performed by: PHYSICAL THERAPIST

## 2021-10-14 PROCEDURE — 97140 MANUAL THERAPY 1/> REGIONS: CPT | Performed by: PHYSICAL THERAPIST

## 2021-10-14 PROCEDURE — 97110 THERAPEUTIC EXERCISES: CPT | Performed by: PHYSICAL THERAPIST

## 2021-10-19 ENCOUNTER — OFFICE VISIT (OUTPATIENT)
Dept: PHYSICAL THERAPY | Facility: REHABILITATION | Age: 41
End: 2021-10-19
Payer: COMMERCIAL

## 2021-10-19 DIAGNOSIS — S72.391D OTHER CLOSED FRACTURE OF SHAFT OF RIGHT FEMUR WITH ROUTINE HEALING, SUBSEQUENT ENCOUNTER: Primary | ICD-10-CM

## 2021-10-19 DIAGNOSIS — M25.562 ACUTE PAIN OF LEFT KNEE: ICD-10-CM

## 2021-10-19 PROCEDURE — 97140 MANUAL THERAPY 1/> REGIONS: CPT

## 2021-10-19 PROCEDURE — 97110 THERAPEUTIC EXERCISES: CPT

## 2021-10-20 ENCOUNTER — APPOINTMENT (OUTPATIENT)
Dept: PHYSICAL THERAPY | Facility: REHABILITATION | Age: 41
End: 2021-10-20
Payer: COMMERCIAL

## 2021-10-26 ENCOUNTER — APPOINTMENT (OUTPATIENT)
Dept: PHYSICAL THERAPY | Facility: REHABILITATION | Age: 41
End: 2021-10-26
Payer: COMMERCIAL

## 2021-12-28 ENCOUNTER — OFFICE VISIT (OUTPATIENT)
Dept: OBGYN CLINIC | Facility: HOSPITAL | Age: 41
End: 2021-12-28
Payer: COMMERCIAL

## 2021-12-28 ENCOUNTER — HOSPITAL ENCOUNTER (OUTPATIENT)
Dept: RADIOLOGY | Facility: HOSPITAL | Age: 41
Discharge: HOME | End: 2021-12-28
Attending: ORTHOPAEDIC SURGERY
Payer: COMMERCIAL

## 2021-12-28 VITALS — WEIGHT: 175 LBS | BODY MASS INDEX: 30.04 KG/M2

## 2021-12-28 DIAGNOSIS — S72.301D CLOSED FRACTURE OF SHAFT OF RIGHT FEMUR WITH ROUTINE HEALING, UNSPECIFIED FRACTURE MORPHOLOGY, SUBSEQUENT ENCOUNTER: Primary | ICD-10-CM

## 2021-12-28 DIAGNOSIS — S72.301D CLOSED FRACTURE OF SHAFT OF RIGHT FEMUR WITH ROUTINE HEALING, UNSPECIFIED FRACTURE MORPHOLOGY, SUBSEQUENT ENCOUNTER: ICD-10-CM

## 2021-12-28 PROCEDURE — 99213 OFFICE O/P EST LOW 20 MIN: CPT | Performed by: ORTHOPAEDIC SURGERY

## 2021-12-28 PROCEDURE — 73552 X-RAY EXAM OF FEMUR 2/>: CPT

## 2022-01-11 ENCOUNTER — EVALUATION (OUTPATIENT)
Dept: PHYSICAL THERAPY | Facility: REHABILITATION | Age: 42
End: 2022-01-11
Payer: COMMERCIAL

## 2022-01-11 DIAGNOSIS — S72.301D CLOSED FRACTURE OF SHAFT OF RIGHT FEMUR WITH ROUTINE HEALING, UNSPECIFIED FRACTURE MORPHOLOGY, SUBSEQUENT ENCOUNTER: ICD-10-CM

## 2022-01-11 PROCEDURE — 97110 THERAPEUTIC EXERCISES: CPT

## 2022-01-11 PROCEDURE — 97161 PT EVAL LOW COMPLEX 20 MIN: CPT

## 2022-01-11 NOTE — PROGRESS NOTES
PT Evaluation     Today's date: 2022  Patient name: Donna Traore  :   MRN: [id number]  Referring provider: Lopez Muñiz DO  Dx:   Encounter Diagnosis     ICD-10-CM    1  Other closed fracture of shaft of right femur with routine healing, subsequent encounter  S72 391D    2  Acute pain of left knee  M25 562                  Assessment  Assessment details: Donna Traore is a 43y o  year old male with a referred dx of closed fx of shaft of R femur with routine healing  Pt presents with pain with functional activities such as squatting, kneeling, and negotiating stairs  Upon further clinical testing, pt demonstrates pain with end range hip/knee flexion, decreased LE strength, and impaired balance  Pt would benefit from skilled OP PT to address these, and the below impairments in order to optimize outcomes and promote return to functional baseline  Impairments: abnormal gait, abnormal or restricted ROM, activity intolerance, impaired physical strength and pain with function  Understanding of Dx/Px/POC: good   Prognosis: good      Goals    Short Term Goals: In 4 weeks, the patient will:  1  Decrease worst pain by 2 points for improved QOL  2  Improve LE strength by 1/2 grade for improved LE fx    3  Supervision with HEP for self care  Long Term Goals: In 8 weeks, the patient will:  1  Improve SLS balance to 30 seconds for return to baseline fx  2  FOTO to greater than predicted value  3  Independent with comprehensive HEP upon discharge  4  Report no pain during squat to perform during work duties  5  Report no pain during work requirements (kneeling, lunge)  6  Tolerate ambulating for 20 minutes for improved functional tolerance  Plan  Plan details: [facility]  All questions were answered to pt's satisfaction      Patient would benefit from: skilled physical therapy  Planned therapy interventions: manual therapy, joint mobilization, neuromuscular re-education, patient education, balance, flexibility, stretching, strengthening, therapeutic activities, therapeutic exercise, home exercise program and graded exercise  Frequency: 2x week  Duration in weeks: 8  Plan of Care beginning date: 2022  Treatment plan discussed with: patient        Subjective Evaluation    Pt is S/P Insertion Nail Im Femur Retrograde - Right, Incision And Drainage (i&d) Left Knee - Left, and Repair Tendon, Triceps - Left on 2021 with Dr Alec Garcia  Pt reports he is having pain with work duties and ambulation greater than 10 minutes  Pt participating in skilled PT several months ago, but had to finish due to trip to [place] for oral surgery  Pt has now returned, and he and surgeon would like to continue with skilled PT  Pt is FWB without AD at this time, and working full time as a [employer]-  Pt denies numbness/tingling or changes in sleep       Pain  Current pain ratin  At best pain ratin  At worst pain ratin  Location: R hip and knee    Treatments  Current treatment: physical therapy  Patient Goals  Patient goals for therapy: decreased pain, increased motion, increased strength, improved balance and independence with ADLs/IADLs          Objective         Passive Range of Motion   R hip  Flexion: 105 with pain    Right Knee   Flexion: 103 degrees with pain  Extension: 0 degrees     Strength/Myotome Testing     Left Hip   Planes of Motion   Flexion: 4+  Extension: 4  Abduction: 4  IR: 5  ER: 5    Right Hip   Planes of Motion   Flexion: 4  Extension: 4- (pain)  Abduction: 4- (pain)  IR: 3+ (pain)  ER: 3+ (pain    Left Knee   Flexion: 4+  Ext: 5    Right Knee   Flexion: 4-  Ext: 4 (pain)        Other tests:     Lower Quarter screen: unremarkable   Patella mobility - R: global hypomobility, pain with lat and sup glides  Quad set: poor quality, dec strength on R  Balance: SLS - L 30 sec, R 5 sec +pain             Precautions: none        Manuals 1/11       R patella PROM                                        Neuro Re-Ed          Quad set         Quad set + SLR          Mini squats           DL          SLS        Ther Ex          TM        Bridge x10 hep       SL hip abd x10 hep       Prone quad stretch, strap 30" hep       STS        Lunges        TKE        Clamshells/ rev        SL Leg press        Stand ham curls        LAQ                Ther Activity          Side stepping        FSU        LSU                        Gait Training                                Modalities

## 2022-01-17 ENCOUNTER — APPOINTMENT (OUTPATIENT)
Dept: PHYSICAL THERAPY | Facility: REHABILITATION | Age: 42
End: 2022-01-17
Payer: COMMERCIAL

## 2022-01-20 ENCOUNTER — OFFICE VISIT (OUTPATIENT)
Dept: PHYSICAL THERAPY | Facility: REHABILITATION | Age: 42
End: 2022-01-20
Payer: COMMERCIAL

## 2022-01-20 DIAGNOSIS — S72.301D CLOSED FRACTURE OF SHAFT OF RIGHT FEMUR WITH ROUTINE HEALING, UNSPECIFIED FRACTURE MORPHOLOGY, SUBSEQUENT ENCOUNTER: Primary | ICD-10-CM

## 2022-01-20 PROCEDURE — 97140 MANUAL THERAPY 1/> REGIONS: CPT

## 2022-01-20 PROCEDURE — 97112 NEUROMUSCULAR REEDUCATION: CPT

## 2022-01-20 PROCEDURE — 97530 THERAPEUTIC ACTIVITIES: CPT

## 2022-01-20 PROCEDURE — 97110 THERAPEUTIC EXERCISES: CPT

## 2022-01-20 NOTE — PROGRESS NOTES
Daily Note     Today's date: 2022  Patient name: Tyra Fernandez  : [birth year]  MRN: [id number]  Referring provider: Kosta Zimmer DO  Dx:   Encounter Diagnosis     ICD-10-CM    1  Closed fracture of shaft of right femur with routine healing, unspecified fracture morphology, subsequent encounter  S72 393D                   Subjective: Pt denies pain today, and reports his HEP is going well  Objective: See treatment diary below      Assessment: Tolerated treatment well  Pt reports pain during end range quad activation during SAQ, LAQ, and quad set+SLR  Pt tolerated all other exercises without pain increase  Pt notes RLE fatigue post-tx, but no pain increase  Monitor response to new exercises at f/u  Patient would benefit from continued PT  1:1 with Juan Pablo Ribera DPT for entirety of tx  Plan: Continue per plan of care        Precautions: none        Manuals       R patella PROM  CM                                      Neuro Re-Ed          Quad set  5"x10       Quad set + SLR  held        Mini squats   x10        DL   x10 15# KB       SLS        Ther Ex          TM  Upright bike 10' 8 show      Bridge x10 hep       SL hip abd x10 hep       Prone quad stretch, strap 30" hep       STS  2x10 tap chair +foam 15#      Lunges        TKE  x10 gtb      Clamshells/ rev  2x10 otb clam, x10 rev      SL Leg press  2x15 40#      Stand ham curls  x10      LAQ  x10 dec ROM              Ther Activity          Side stepping        FSU/FSD  x10 R 6"      LSU  x10 B 6"                      Gait Training                                Modalities

## 2022-01-24 ENCOUNTER — OFFICE VISIT (OUTPATIENT)
Dept: PHYSICAL THERAPY | Facility: REHABILITATION | Age: 42
End: 2022-01-24
Payer: COMMERCIAL

## 2022-01-24 DIAGNOSIS — S72.301D CLOSED FRACTURE OF SHAFT OF RIGHT FEMUR WITH ROUTINE HEALING, UNSPECIFIED FRACTURE MORPHOLOGY, SUBSEQUENT ENCOUNTER: Primary | ICD-10-CM

## 2022-01-24 PROCEDURE — 97110 THERAPEUTIC EXERCISES: CPT

## 2022-01-24 PROCEDURE — 97140 MANUAL THERAPY 1/> REGIONS: CPT

## 2022-01-24 PROCEDURE — 97112 NEUROMUSCULAR REEDUCATION: CPT

## 2022-01-24 NOTE — PROGRESS NOTES
Daily Note     Today's date: 2022  Patient name: Cynthia Bauer  : [birth year]  MRN: [id number]  Referring provider: Linda Velasquez DO  Dx:   Encounter Diagnosis     ICD-10-CM    1  Closed fracture of shaft of right femur with routine healing, unspecified fracture morphology, subsequent encounter  X14 [id number]                   Subjective: Pt reports increased pain today due to heavy activity at work  Pt reports minor soreness following previous tx  Objective: See treatment diary below      Assessment: Tolerated treatment well  Pt continues to have pain in quad throughout quad isolation exercises  Tolerated increased LAQ reps, but with less ROM  Pt reports quad "tightness" in middle of session  Relieved with prone quad stretching with strap  Pt required VC to perform to feeling stretch, but without pushing into pain  Encouraged pt to continue performing stretch at home for relief  Patient would benefit from continued PT  1:1 with Renée Wallace DPT for entirety of tx  Plan: Continue per plan of care        Precautions: none        Manuals      R patella PROM  CM CM                                     Neuro Re-Ed          Quad set  5"x10  5"x10     Quad set + SLR  held        Mini squats   x10  x10      DL   x10 15# KB  x15 15# KB     SLS        Ther Ex          TM  Upright bike 10' 8 show Upright bike 10' 8 show     Bridge x10 hep       SL hip abd x10 hep       Prone quad stretch, strap 30" hep  30"x3 R     STS  2x10 tap chair +foam 15# 2x10 tap chair +foam 15#     Lunges        TKE  x10 gtb x10 gtb     Clamshells/ rev  2x10 otb clam, x10 rev 2x10 otb clam, held rev     SL Leg press  2x15 40#      Stand ham curls  x10 2x10 3#     LAQ  x10 dec ROM x20 dec ROM             Ther Activity          Side stepping        FSU/FSD  x10 R 6" np     LSU  x10 B 6" np                     Gait Training                                Modalities

## 2022-01-27 ENCOUNTER — OFFICE VISIT (OUTPATIENT)
Dept: PHYSICAL THERAPY | Facility: REHABILITATION | Age: 42
End: 2022-01-27
Payer: COMMERCIAL

## 2022-01-27 DIAGNOSIS — S72.301D CLOSED FRACTURE OF SHAFT OF RIGHT FEMUR WITH ROUTINE HEALING, UNSPECIFIED FRACTURE MORPHOLOGY, SUBSEQUENT ENCOUNTER: Primary | ICD-10-CM

## 2022-01-27 PROCEDURE — 97530 THERAPEUTIC ACTIVITIES: CPT

## 2022-01-27 PROCEDURE — 97112 NEUROMUSCULAR REEDUCATION: CPT

## 2022-01-27 PROCEDURE — 97110 THERAPEUTIC EXERCISES: CPT

## 2022-01-27 NOTE — PROGRESS NOTES
Daily Note     Today's date: 2022  Patient name: Jovi Yanez  : [birth year]  MRN: [id number]  Referring provider: Darcie Joyce DO  Dx:   Encounter Diagnosis     ICD-10-CM    1  Closed fracture of shaft of right femur with routine healing, unspecified fracture morphology, subsequent encounter  X35 621O                   Subjective: Pt reports slight improvement in pain today  Objective: See treatment diary below      Assessment: Tolerated treatment well  Pt more limited today secondary to pain in knee and hip during functional exercises  Pt limited with ROM during squat and deadlift secondary to pain  Pt continues to report relief with prone quad stretching  Patella PROM not performed due to good motion when assessed, and no pain  Monitor response at f/u  Encouraged pt to continue CP at home PRN with good relief  Patient would benefit from continued PT  1:1 with Berwyn Cogan, DPT for entirety of tx  Plan: Continue per plan of care        Precautions: none        Manuals     R patella PROM  CM CM np                                    Neuro Re-Ed          Quad set  5"x10  5"x10 5"x10    Quad set + SLR  held   trialed     Mini squats   x10  x10 x10     DL   x10 15# KB  x15 15# KB x10 15# KB    SLS        Ther Ex          TM  Upright bike 10' 8 show Upright bike 10' 8 show Upright bike 10' 8 show    Bridge x10 hep   2x10 10#    SL hip abd x10 hep       Prone quad stretch, strap 30" hep  30"x3 R 30"x3 R    STS  2x10 tap chair +foam 15# 2x10 tap chair +foam 15# x10 Hilow tap    Lunges        TKE  x10 gtb x10 gtb 2x10 gtb    Clamshells/ rev  2x10 otb clam, x10 rev 2x10 otb clam, held rev 2x10 otb clam, x10 rev    SL Leg press  2x15 40#      Stand ham curls  x10 2x10 3# 2x10 5#    LAQ  x10 dec ROM x20 dec ROM x20 dec ROM            Ther Activity          Side stepping        FSU/FSD  x10 R 6" np x10 R 6"    LSU  x10 B 6" np                     Gait Training                                Modalities

## 2022-01-31 ENCOUNTER — OFFICE VISIT (OUTPATIENT)
Dept: PHYSICAL THERAPY | Facility: REHABILITATION | Age: 42
End: 2022-01-31
Payer: COMMERCIAL

## 2022-01-31 DIAGNOSIS — S72.301D CLOSED FRACTURE OF SHAFT OF RIGHT FEMUR WITH ROUTINE HEALING, UNSPECIFIED FRACTURE MORPHOLOGY, SUBSEQUENT ENCOUNTER: Primary | ICD-10-CM

## 2022-01-31 PROCEDURE — 97112 NEUROMUSCULAR REEDUCATION: CPT

## 2022-01-31 PROCEDURE — 97140 MANUAL THERAPY 1/> REGIONS: CPT

## 2022-01-31 PROCEDURE — 97110 THERAPEUTIC EXERCISES: CPT

## 2022-01-31 PROCEDURE — 97530 THERAPEUTIC ACTIVITIES: CPT

## 2022-01-31 NOTE — PROGRESS NOTES
Daily Note     Today's date: 2022  Patient name: Geronimo Messer  :   MRN: [id number]  Referring provider: Figueroa Cramer DO  Dx:   Encounter Diagnosis     ICD-10-CM    1  Closed fracture of shaft of right femur with routine healing, unspecified fracture morphology, subsequent encounter  X64 049W                   Subjective: Pt reports no pain for today's session due to not having to work over the weekend  Pt reports he did not perform his HEP this weekend  Objective: See treatment diary below      Assessment: Tolerated treatment well  Pt continues to have end range knee ext pain distal to patella during SLR, SAQ, and LAQ  Pain is reported less during TKE exercise  Pt tolerated all other exercises without pain increase  No pain noted during patella mobility  Continue to progress as tolerated  Patient would benefit from continued PT  1:1 with Daniel Darden DPT for entirety of tx  Plan: Continue per plan of care        Precautions: none        Manuals    R patella PROM  CM CM np CM   R hip PROM     CM                           Neuro Re-Ed          Quad set  5"x10  5"x10 5"x10 5"x10   Quad set + SLR  held   trialed     Mini squats   x10  x10 x10 x10    DL   x10 15# KB  x15 15# KB x10 15# KB    SLS     30"x2 R   Ther Ex          TM  Upright bike 10' 8 show Upright bike 10' 8 show Upright bike 10' 8 show Upright bike 10' 8 show   Bridge x10 hep   2x10 10# 2x10 12#   SL hip abd x10 hep       Prone quad stretch, strap 30" hep  30"x3 R 30"x3 R 30"x3 R   STS  2x10 tap chair +foam 15# 2x10 tap chair +foam 15# x10 Hilow tap np   Lunges        TKE  x10 gtb x10 gtb 2x10 gtb 2x10 gtb   Clamshells/ rev  2x10 otb clam, x10 rev 2x10 otb clam, held rev 2x10 otb clam, x10 rev 3x10 otb clam, 2x10 rev   SL Leg press  2x15 40#      Stand ham curls  x10 2x10 3# 2x10 5# np   LAQ  x10 dec ROM x20 dec ROM x20 dec ROM np   Prone hip ext     x10   TR     2x10   Leg press 2x10 55#   Ther Activity          Side stepping     x6 laps otb   FSU/FSD  x10 R 6" np x10 R 6"    LSU  x10 B 6" np                     Gait Training                                Modalities

## 2022-02-01 ENCOUNTER — HOSPITAL ENCOUNTER (OUTPATIENT)
Dept: RADIOLOGY | Facility: HOSPITAL | Age: 42
Discharge: HOME | End: 2022-02-01
Attending: ORTHOPAEDIC SURGERY
Payer: COMMERCIAL

## 2022-02-01 DIAGNOSIS — S72.301D CLOSED FRACTURE OF SHAFT OF RIGHT FEMUR WITH ROUTINE HEALING, UNSPECIFIED FRACTURE MORPHOLOGY, SUBSEQUENT ENCOUNTER: ICD-10-CM

## 2022-02-01 PROCEDURE — G1004 CDSM NDSC: HCPCS

## 2022-02-01 PROCEDURE — 73700 CT LOWER EXTREMITY W/O DYE: CPT

## 2022-02-03 ENCOUNTER — APPOINTMENT (OUTPATIENT)
Dept: PHYSICAL THERAPY | Facility: REHABILITATION | Age: 42
End: 2022-02-03
Payer: COMMERCIAL

## 2022-02-03 ENCOUNTER — OFFICE VISIT (OUTPATIENT)
Dept: OBGYN CLINIC | Facility: HOSPITAL | Age: 42
End: 2022-02-03
Payer: COMMERCIAL

## 2022-02-03 VITALS — WEIGHT: 175 LBS | BODY MASS INDEX: 30.04 KG/M2

## 2022-02-03 DIAGNOSIS — S72.301G: Primary | ICD-10-CM

## 2022-02-03 PROCEDURE — 99213 OFFICE O/P EST LOW 20 MIN: CPT | Performed by: ORTHOPAEDIC SURGERY

## 2022-02-03 NOTE — PROGRESS NOTES
ASSESSMENT/PLAN:    Assessment:   43 y o  male  Over 9 months s/p IM nail insertion right femur performed 4/25/2021    Plan: Today I had a long discussion with the patient and caregiver regarding the diagnosis and plan moving forward  CT scan reviewed, there are no evidence of significant healing of his right femoral shaft fracture  Hardware is in stable position with no signs of loosening or failure  I would not necessarily recommend any surgical intervention at this point as he is doing so well and has not been having any pain  Patient and his wife do understand that there is a risk that the hardware will fail and he may require another surgery  Should this happen we will have him follow-up with Dr Sun Matute  For now we will continue to monitor him periodically on x-ray  He would like to continue with physical therapy as it is still helping him, referral placed  Will plan see him back in 3 months for repeat x-rays and to see how he is doing  Should follow-up sooner should any problems arise or he starts to have increased pain  Follow up: 3 months for repeat x-rays of the right femur    The above diagnosis and plan has been dicussed with the patient and caregiver  They verbalized an understanding and will follow up accordingly  _____________________________________________________    SUBJECTIVE:  Radha Box is a 43 y o  male who presents for follow up over 9 months s/p IM nail insertion right femur performed 4/25/2021  At his last visit we ordered a CT scan to evaluate for healing  Patient is doing very well  He has no complaints of pain at all  He does have a hard time with prolonged ambulation as his leg starts to feel tired but otherwise no issues  Denies numbness and tingling  He would also like to continue physical therapy as he feels like it is helping him  PAST MEDICAL HISTORY:  History reviewed  No pertinent past medical history      PAST SURGICAL HISTORY:  Past Surgical History:   Procedure Laterality Date    APPENDECTOMY      CYST REMOVAL      ESOPHAGOGASTRODUODENOSCOPY N/A 1/22/2016    Procedure: ESOPHAGOGASTRODUODENOSCOPY (EGD); Surgeon: Charmayne Honey, MD;  Location: [facility]; Service:     INCISION AND DRAINAGE OF WOUND Left 4/25/2021    Procedure: INCISION AND DRAINAGE (I&D) LEFT KNEE;  Surgeon: Monica Castanon DO;  Location: [facility];  Service: Orthopedics    IA OPEN RX FEMUR FX+INTRAMED DIANE Right 4/25/2021    Procedure: INSERTION NAIL IM FEMUR RETROGRADE;  Surgeon: Monica Castanon DO;  Location: [facility];  Service: Orthopedics       FAMILY HISTORY:  History reviewed  No pertinent family history  SOCIAL HISTORY:  Social History     Tobacco Use    Smoking status: Current Some Day Smoker    Smokeless tobacco: Never Used   Substance Use Topics    Alcohol use: Yes     Comment: occasional     Drug use: Not Currently       MEDICATIONS:    Current Outpatient Medications:     aspirin (ECOTRIN) 325 mg EC tablet, Take 1 tablet (325 mg total) by mouth 2 (two) times a day, Disp: 70 tablet, Rfl: 0    docusate sodium (COLACE) 100 mg capsule, Take 1 capsule (100 mg total) by mouth 2 (two) times a day (Patient not taking: Reported on 9/23/2021), Disp: 10 capsule, Rfl: 0    methocarbamol (ROBAXIN) 500 mg tablet, Take 1 tablet (500 mg total) by mouth 4 (four) times a day for 10 doses, Disp: 40 tablet, Rfl: 0    polyethylene glycol (MIRALAX) 17 g packet, Take 17 g by mouth daily (Patient not taking: Reported on 9/23/2021), Disp: , Rfl: 0    ALLERGIES:  No Known Allergies    REVIEW OF SYSTEMS:  ROS is negative other than that noted in the HPI  Constitutional: Negative for fatigue and fever  HENT: Negative for sore throat  Respiratory: Negative for shortness of breath  Cardiovascular: Negative for chest pain  Gastrointestinal: Negative for abdominal pain  Endocrine: Negative for cold intolerance and heat intolerance  Genitourinary: Negative for flank pain  Musculoskeletal: Negative for back pain  Skin: Negative for rash  Allergic/Immunologic: Negative for immunocompromised state  Neurological: Negative for dizziness  Psychiatric/Behavioral: Negative for agitation  _____________________________________________________  PHYSICAL EXAMINATION:  General/Constitutional: NAD, well developed, well nourished  HENT: Normocephalic, atraumatic  CV: Intact distal pulses, regular rate  Resp: No respiratory distress or labored breathing  Lymphatic: No lymphadenopathy palpated  Neuro: Alert and  awake  Psych: Normal mood  Skin: Warm, dry, no rashes, no erythema      MUSCULOSKELETAL EXAMINATION:  Musculoskeletal: Right Hip    Well-healed incisions, no erythema or ecchymosis    TTP nontender at fracture site   ROM:  Within normal limits  Special Tests:  No pertinent positive or negative tests  Alignment:  Normal lumbar alignment  Normal resting hip posture  Normal knee alignment  Ambulates well    LE NV Exam: +2 DP/PT pulses bilaterally  Sensation intact to light touch L2-S1 bilaterally     Bilateral Knee and ankle ROM demonstrates no pain actively or passively    No calf tenderness to palpation bilaterally      _____________________________________________________  STUDIES REVIEWED:  CT scan of the right femur performed 2/1/2022 demonstrates no evidence of healing of right femoral shaft fracture  Hardware stable with no signs of loosening or failure         PROCEDURES PERFORMED:  No Procedures performed today     Scribe Attestation    I,:  India Koyanagi am acting as a scribe while in the presence of the attending physician :       I,:  June Don DO personally performed the services described in this documentation    as scribed in my presence :

## 2022-02-07 ENCOUNTER — APPOINTMENT (OUTPATIENT)
Dept: PHYSICAL THERAPY | Facility: REHABILITATION | Age: 42
End: 2022-02-07
Payer: COMMERCIAL

## 2022-02-08 ENCOUNTER — APPOINTMENT (OUTPATIENT)
Dept: PHYSICAL THERAPY | Facility: REHABILITATION | Age: 42
End: 2022-02-08
Payer: COMMERCIAL

## 2022-02-10 ENCOUNTER — APPOINTMENT (OUTPATIENT)
Dept: PHYSICAL THERAPY | Facility: REHABILITATION | Age: 42
End: 2022-02-10
Payer: COMMERCIAL

## 2022-02-15 ENCOUNTER — OFFICE VISIT (OUTPATIENT)
Dept: PHYSICAL THERAPY | Facility: REHABILITATION | Age: 42
End: 2022-02-15
Payer: COMMERCIAL

## 2022-02-15 DIAGNOSIS — S72.301D CLOSED FRACTURE OF SHAFT OF RIGHT FEMUR WITH ROUTINE HEALING, UNSPECIFIED FRACTURE MORPHOLOGY, SUBSEQUENT ENCOUNTER: Primary | ICD-10-CM

## 2022-02-15 PROCEDURE — 97140 MANUAL THERAPY 1/> REGIONS: CPT

## 2022-02-15 PROCEDURE — 97110 THERAPEUTIC EXERCISES: CPT

## 2022-02-15 PROCEDURE — 97112 NEUROMUSCULAR REEDUCATION: CPT

## 2022-02-15 NOTE — PROGRESS NOTES
Daily Note     Today's date: 2/15/2022  Patient name: Araseli Talavera  : [birth year]  MRN: [id number]  Referring provider: Delbert Rivera DO  Dx:   Encounter Diagnosis     ICD-10-CM    1  Closed fracture of shaft of right femur with routine healing, unspecified fracture morphology, subsequent encounter  V36 206O                   Subjective: Pt reported no changes in symptoms since LV  Pt missed 2 weeks due to work and schedule conflicts  Objective: See treatment diary below      Assessment: Tolerated treatment well  Patient demonstrated fatigue post treatment and exhibited good technique with therapeutic exercises  VC's needed for correct technique throughout session  Some discomfort with WB activities but performed to his tolerance  Plan: Continue per plan of care        Precautions: none        Manuals 1/11 1/20 1/24 1/27 1/31 2/15   R patella PROM  CM CM np CM TC   R hip PROM     CM TC                              Neuro Re-Ed           Quad set  5"x10  5"x10 5"x10 5"x10 5"x10   Quad set + SLR  held   trialed      Mini squats   x10  x10 x10 x10 x10    DL   x10 15# KB  x15 15# KB x10 15# KB     SLS     30"x2 R 30"x2 R   Ther Ex           TM  Upright bike 10' 8 show Upright bike 10' 8 show Upright bike 10' 8 show Upright bike 10' 8 show Upright bike 10' seat 8   Bridge x10 hep   2x10 10# 2x10 12# 2x10 12#   SL hip abd x10 hep        Prone quad stretch, strap 30" hep  30"x3 R 30"x3 R 30"x3 R 30"x3 R   STS  2x10 tap chair +foam 15# 2x10 tap chair +foam 15# x10 Hilow tap np    Lunges         TKE  x10 gtb x10 gtb 2x10 gtb 2x10 gtb 2x10 gtb   Clamshells/ rev  2x10 otb clam, x10 rev 2x10 otb clam, held rev 2x10 otb clam, x10 rev 3x10 otb clam, 2x10 rev 3x10 otb clam, 2x10 rev   SL Leg press  2x15 40#       Stand ham curls  x10 2x10 3# 2x10 5# np    LAQ  x10 dec ROM x20 dec ROM x20 dec ROM np    Prone hip ext     x10 x10   TR     2x10 2x10   Leg press     2x10 55# 2x10 55#   Ther Activity      Side stepping     x6 laps otb x6 laps otb   FSU/FSD  x10 R 6" np x10 R 6"     LSU  x10 B 6" np                        Gait Training                                   Modalities

## 2022-02-22 ENCOUNTER — APPOINTMENT (OUTPATIENT)
Dept: PHYSICAL THERAPY | Facility: REHABILITATION | Age: 42
End: 2022-02-22
Payer: COMMERCIAL

## 2022-03-01 ENCOUNTER — OFFICE VISIT (OUTPATIENT)
Dept: PHYSICAL THERAPY | Facility: REHABILITATION | Age: 42
End: 2022-03-01
Payer: COMMERCIAL

## 2022-03-01 DIAGNOSIS — S72.301D CLOSED FRACTURE OF SHAFT OF RIGHT FEMUR WITH ROUTINE HEALING, UNSPECIFIED FRACTURE MORPHOLOGY, SUBSEQUENT ENCOUNTER: Primary | ICD-10-CM

## 2022-03-01 PROCEDURE — 97530 THERAPEUTIC ACTIVITIES: CPT

## 2022-03-01 PROCEDURE — 97112 NEUROMUSCULAR REEDUCATION: CPT

## 2022-03-01 PROCEDURE — 97140 MANUAL THERAPY 1/> REGIONS: CPT

## 2022-03-01 PROCEDURE — 97110 THERAPEUTIC EXERCISES: CPT

## 2022-03-01 NOTE — PROGRESS NOTES
Daily Note     Today's date: 3/1/2022  Patient name: Yeison Mcnair  : [id number]  MRN: [id number]  Referring provider: Raphael Gallegos DO  Dx:   Encounter Diagnosis     ICD-10-CM    1  Closed fracture of shaft of right femur with routine healing, unspecified fracture morphology, subsequent encounter  S77 944H                   Subjective: Pt has not been seen in approx 2 weeks due to work conflicts  Pt reports pain is minimal today, and less that usual        Objective: See treatment diary below      Assessment: Tolerated treatment well  Pt demonstrating improved PROM at R hip, but continues to have limitations into knee flexion with c/o pain at end range  Pt able to progress several strengthening exercises today with good tolerance, reporting LE fatigue but no pain  Continue to progress strengthening as tolerated  Patient demonstrated fatigue post treatment and exhibited good technique with therapeutic exercises  1:1 with Ivan Rodriguez DPT for entirety of tx  Plan: Continue per plan of care        Precautions: none        Manuals 1/11 1/20 1/24 1/27 1/31 2/15 3/1   R patella PROM  CM CM np CM TC CM   R hip PROM     CM TC np   R knee PROM       CM                       Neuro Re-Ed            Quad set  5"x10  5"x10 5"x10 5"x10 5"x10 5"x10   Quad set + SLR  held   trialed       Mini squats   x10  x10 x10 x10 x10 2x10    DL   x10 15# KB  x15 15# KB x10 15# KB      SLS     30"x2 R 30"x2 R 30"x2 R   Ther Ex            TM  Upright bike 10' 8 show Upright bike 10' 8 show Upright bike 10' 8 show Upright bike 10' 8 show Upright bike 10' seat 8 Upright bike 10' seat 8 L4   Bridge x10 hep   2x10 10# 2x10 12# 2x10 12# 2x15 12#   SL hip abd x10 hep         Prone quad stretch, strap 30" hep  30"x3 R 30"x3 R 30"x3 R 30"x3 R 30"x3 R   STS  2x10 tap chair +foam 15# 2x10 tap chair +foam 15# x10 Hilow tap np     Lunges          TKE  x10 gtb x10 gtb 2x10 gtb 2x10 gtb 2x10 gtb np   Clamshells/ rev  2x10 otb clam, x10 rev 2x10 otb clam, held rev 2x10 otb clam, x10 rev 3x10 otb clam, 2x10 rev 3x10 otb clam, 2x10 rev 2x15 otb clam  2x10 rev   SL Leg press  2x15 40#        Stand ham curls  x10 2x10 3# 2x10 5# np     LAQ  x10 dec ROM x20 dec ROM x20 dec ROM np     Prone hip ext     x10 x10 np   TR     2x10 2x10    Leg press     2x10 55# 2x10 55# 2x15 85#   Ther Activity            Side stepping     x6 laps otb x6 laps otb x6 laps otb   FSU/FSD  x10 R 6" np x10 R 6"   2x6 FSU 6"   LSU  x10 B 6" np                           Gait Training                                      Modalities

## 2022-03-03 ENCOUNTER — APPOINTMENT (OUTPATIENT)
Dept: PHYSICAL THERAPY | Facility: REHABILITATION | Age: 42
End: 2022-03-03
Payer: COMMERCIAL

## 2022-03-07 ENCOUNTER — OFFICE VISIT (OUTPATIENT)
Dept: PHYSICAL THERAPY | Facility: REHABILITATION | Age: 42
End: 2022-03-07
Payer: COMMERCIAL

## 2022-03-07 DIAGNOSIS — S72.301D CLOSED FRACTURE OF SHAFT OF RIGHT FEMUR WITH ROUTINE HEALING, UNSPECIFIED FRACTURE MORPHOLOGY, SUBSEQUENT ENCOUNTER: Primary | ICD-10-CM

## 2022-03-07 PROCEDURE — 97140 MANUAL THERAPY 1/> REGIONS: CPT

## 2022-03-07 PROCEDURE — 97110 THERAPEUTIC EXERCISES: CPT

## 2022-03-07 PROCEDURE — 97112 NEUROMUSCULAR REEDUCATION: CPT

## 2022-03-07 PROCEDURE — 97530 THERAPEUTIC ACTIVITIES: CPT

## 2022-03-07 NOTE — PROGRESS NOTES
Daily Note     Today's date: 3/7/2022  Patient name: Yogesh Tejada  :   MRN: [id number]  Referring provider: Sherrell Hayes DO  Dx:   Encounter Diagnosis     ICD-10-CM    1  Closed fracture of shaft of right femur with routine healing, unspecified fracture morphology, subsequent encounter  Q35 005F                   Subjective: Pt reports no notable overall changes upon arrival      Objective: See treatment diary below      Assessment: Tolerated treatment well  Patient would benefit from continued PT   Pt  able to complete all exercises with no increase in pain during or after session  Pt able to progress some exercises this session without complaint  Pt still has some pain in medial aspect of knee with end ranges of flexion  Pt  1:1 with PTA for entirety  Plan: Continue per plan of care        Precautions: none        Manuals 1/11 1/20 1/24 1/27 1/31 2/15 3/1 3/7   R patella PROM  CM CM np CM TC CM KP 2'   R hip PROM     CM TC np    R knee PROM       CM KP 6'                         Neuro Re-Ed             Quad set  5"x10  5"x10 5"x10 5"x10 5"x10 5"x10 15x5"   Quad set + SLR  held   trialed        Mini squats   x10  x10 x10 x10 x10 2x10 2x15    DL   x10 15# KB  x15 15# KB x10 15# KB       SLS     30"x2 R 30"x2 R 30"x2 R 3x30" R   Ther Ex             TM  Upright bike 10' 8 show Upright bike 10' 8 show Upright bike 10' 8 show Upright bike 10' 8 show Upright bike 10' seat 8 Upright bike 10' seat 8 L4 UB 10' seat 8 L4   Bridge x10 hep   2x10 10# 2x10 12# 2x10 12# 2x15 12# 2x15 12#   SL hip abd x10 hep          Prone quad stretch, strap 30" hep  30"x3 R 30"x3 R 30"x3 R 30"x3 R 30"x3 R 3x30" R   STS  2x10 tap chair +foam 15# 2x10 tap chair +foam 15# x10 Hilow tap np      Lunges           TKE  x10 gtb x10 gtb 2x10 gtb 2x10 gtb 2x10 gtb np    Clamshells/ rev  2x10 otb clam, x10 rev 2x10 otb clam, held rev 2x10 otb clam, x10 rev 3x10 otb clam, 2x10 rev 3x10 otb clam, 2x10 rev 2x15 otb clam  2x10 rev 2x15 otb 2x10 rev otb   SL Leg press  2x15 40#         Stand ham curls  x10 2x10 3# 2x10 5# np      LAQ  x10 dec ROM x20 dec ROM x20 dec ROM np      Prone hip ext     x10 x10 np    TR     2x10 2x10     Leg press     2x10 55# 2x10 55# 2x15 85# 2x15 85#   Ther Activity             Side stepping     x6 laps otb x6 laps otb x6 laps otb 6 laps otb   FSU/FSD  x10 R 6" np x10 R 6"   2x6 FSU 6" 2x10 FSU 6"   LSU  x10 B 6" np                              Gait Training                                         Modalities

## 2022-03-14 ENCOUNTER — APPOINTMENT (OUTPATIENT)
Dept: PHYSICAL THERAPY | Facility: REHABILITATION | Age: 42
End: 2022-03-14
Payer: COMMERCIAL

## 2022-05-03 ENCOUNTER — OFFICE VISIT (OUTPATIENT)
Dept: OBGYN CLINIC | Facility: HOSPITAL | Age: 42
End: 2022-05-03
Payer: COMMERCIAL

## 2022-05-03 ENCOUNTER — HOSPITAL ENCOUNTER (OUTPATIENT)
Dept: RADIOLOGY | Facility: HOSPITAL | Age: 42
Discharge: HOME | End: 2022-05-03
Attending: ORTHOPAEDIC SURGERY
Payer: COMMERCIAL

## 2022-05-03 VITALS — BODY MASS INDEX: 30.04 KG/M2 | WEIGHT: 175 LBS

## 2022-05-03 DIAGNOSIS — S72.301G: Primary | ICD-10-CM

## 2022-05-03 DIAGNOSIS — S72.301G: ICD-10-CM

## 2022-05-03 PROCEDURE — 73552 X-RAY EXAM OF FEMUR 2/>: CPT

## 2022-05-03 PROCEDURE — 99213 OFFICE O/P EST LOW 20 MIN: CPT | Performed by: ORTHOPAEDIC SURGERY

## 2022-05-03 NOTE — PROGRESS NOTES
ASSESSMENT/PLAN:    Assessment:   43 y o  male 1 year s/p IM nail insertion right femur performed 4/25/2021    Plan: Today I had a long discussion with the caregiver regarding the diagnosis and plan moving forward  X-rays reviewed today, unfortunately he has not shown any significant signs of healing from previous images however he has had improvement clinically  He has been ambulating much easier and is nontender at the fracture site  He is back to work and has made great progress otherwise  We did discuss the possibility of revision surgery however patient would like to hold off on this at this time  Will continue to monitor this periodically on x-ray  He does understand the hardware could potentially fail at some point but he is agreeable to the plan today  Will plan to see him back in 3 months for repeat x-rays but if he begins to experience any increased pain he should follow-up in the office ASAP  Follow up: 3 months for repeat x-rays of the right femur    The above diagnosis and plan has been dicussed with the patient and caregiver  They verbalized an understanding and will follow up accordingly  _____________________________________________________    SUBJECTIVE:  Nova Coronado is a 43 y o  male who presents for follow up 1 year s/p IM nail insertion right femur performed 4/25/2021  Patient is doing well  He has had more ease with prolonged ambulation than last time we saw him  Denies any significant pain in the thigh today  He does note intermittent muscle spasms but otherwise no real complaints  Denies numbness and tingling  He is here today for repeat x-rays  PAST MEDICAL HISTORY:  History reviewed  No pertinent past medical history  PAST SURGICAL HISTORY:  Past Surgical History:   Procedure Laterality Date    APPENDECTOMY      CYST REMOVAL      ESOPHAGOGASTRODUODENOSCOPY N/A 1/22/2016    Procedure: ESOPHAGOGASTRODUODENOSCOPY (EGD);   Surgeon: Terrance Pacheco MD; Location: [facility]; Service:     INCISION AND DRAINAGE OF WOUND Left 4/25/2021    Procedure: INCISION AND DRAINAGE (I&D) LEFT KNEE;  Surgeon: William Whelan DO;  [facility];  Service: Orthopedics    [place] OPEN RX FEMUR FX+INTRAMED DIANE Right 4/25/2021    Procedure: INSERTION NAIL IM FEMUR RETROGRADE;  Surgeon: William Whelan DO;  [facility];  Service: Orthopedics       FAMILY HISTORY:  History reviewed  No pertinent family history  SOCIAL HISTORY:  Social History     Tobacco Use    Smoking status: Current Some Day Smoker    Smokeless tobacco: Never Used   Substance Use Topics    Alcohol use: Yes     Comment: occasional     Drug use: Not Currently       MEDICATIONS:    Current Outpatient Medications:     aspirin (ECOTRIN) 325 mg EC tablet, Take 1 tablet (325 mg total) by mouth 2 (two) times a day, Disp: 70 tablet, Rfl: 0    docusate sodium (COLACE) 100 mg capsule, Take 1 capsule (100 mg total) by mouth 2 (two) times a day (Patient not taking: Reported on 9/23/2021), Disp: 10 capsule, Rfl: 0    methocarbamol (ROBAXIN) 500 mg tablet, Take 1 tablet (500 mg total) by mouth 4 (four) times a day for 10 doses, Disp: 40 tablet, Rfl: 0    polyethylene glycol (MIRALAX) 17 g packet, Take 17 g by mouth daily (Patient not taking: Reported on 9/23/2021), Disp: , Rfl: 0    ALLERGIES:  No Known Allergies    REVIEW OF SYSTEMS:  ROS is negative other than that noted in the HPI  Constitutional: Negative for fatigue and fever  HENT: Negative for sore throat  Respiratory: Negative for shortness of breath  Cardiovascular: Negative for chest pain  Gastrointestinal: Negative for abdominal pain  Endocrine: Negative for cold intolerance and heat intolerance  Genitourinary: Negative for flank pain  Musculoskeletal: Negative for back pain  Skin: Negative for rash  Allergic/Immunologic: Negative for immunocompromised state  Neurological: Negative for dizziness     Psychiatric/Behavioral: Negative for agitation  _____________________________________________________  PHYSICAL EXAMINATION:  General/Constitutional: NAD, well developed, well nourished  HENT: Normocephalic, atraumatic  CV: Intact distal pulses, regular rate  Resp: No respiratory distress or labored breathing  Lymphatic: No lymphadenopathy palpated  Neuro: Alert and  awake  Psych: Normal mood  Skin: Warm, dry, no rashes, no erythema      MUSCULOSKELETAL EXAMINATION:  Musculoskeletal: Right Hip     Well-healed incisions, no erythema or ecchymosis   No tenderness at the fracture site or elsewhere in the extremity  ROM:  WNL  Special Tests:  No pertinent positive or negative tests      Alignment:  Normal lumbar alignment  Normal resting hip posture  Normal knee alignment  Ambulates well     LE NV Exam: +2 DP/PT pulses bilaterally  Sensation intact to light touch L2-S1 bilaterally     Bilateral Knee and ankle ROM demonstrates no pain actively or passively     No calf tenderness to palpation bilaterally    _____________________________________________________  STUDIES REVIEWED:  Imaging studies reviewed by Dr Drue Felty and demonstrate unchanged alignment of right femoral shaft fracture with no interval signs of healing compared to previous images  Hardware stable with no signs of loosening or failure        PROCEDURES PERFORMED:  No Procedures performed today     Scribe Attestation    I,:  Addy Mccain am acting as a scribe while in the presence of the attending physician :       I,:  Gayleen Alpers, DO personally performed the services described in this documentation    as scribed in my presence :
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
## 2023-03-14 NOTE — PROGRESS NOTES
Subjective: No acute events overnight  No acute distress  Resting comfortably in bed    Objective:  A 10 point ROS was performed; negative except as noted above  Lab Results   Component Value Date/Time    WBC 11 66 (H) 04/25/2021 03:52 AM    HGB 12 2 04/25/2021 03:52 AM       Vitals:    04/24/21 2312   BP: 111/71   Pulse: 85   Resp: 18   Temp: 98 6 °F (37 °C)   SpO2: 99%     Right lower extremity  TTP R Thigh  Kane traction in place  Motor intact to EHL/FHL/TA/GS  Sensation intact to light touch to dp/sp/tib/john/saph distributions  Toes warm and well perfused with brisk capillary refill     Left lower extremity  KI in place  Dressing CDI  Motor intact to EHL/FHL/TA/GS  Sensation intact to light touch to dp/sp/tib/john/saph distributions  Toes warm and well perfused with brisk capillary refill     Left upper extremity  Splint CDI  Motor and sensory exam grossly intact median, radial, and ulnar nerve distributions distally  Fingers WWP    Assessment: 39 y o  male with right femoral shaft, left knee traumatic arthrotomy, and left olecranon avulsion fracture    Plan:  NWB RLE, LLE, and LUE  To OR for I&D L Knee and R Femoral IMN  Pain control  DVT ppx:  On hold for OR  PT/OT  Dispo: Ortho will follow Topical Sulfur Applications Pregnancy And Lactation Text: This medication is Pregnancy Category C and has an unknown safety profile during pregnancy. It is unknown if this topical medication is excreted in breast milk.

## 2024-02-14 ENCOUNTER — HOSPITAL ENCOUNTER (EMERGENCY)
Facility: HOSPITAL | Age: 44
Discharge: HOME | End: 2024-02-14
Attending: EMERGENCY MEDICINE

## 2024-02-14 ENCOUNTER — APPOINTMENT (EMERGENCY)
Dept: RADIOLOGY | Facility: HOSPITAL | Age: 44
End: 2024-02-14

## 2024-02-14 VITALS
TEMPERATURE: 98.3 F | OXYGEN SATURATION: 98 % | SYSTOLIC BLOOD PRESSURE: 160 MMHG | RESPIRATION RATE: 20 BRPM | DIASTOLIC BLOOD PRESSURE: 102 MMHG | HEART RATE: 74 BPM

## 2024-02-14 DIAGNOSIS — Z87.81 HISTORY OF FEMUR FRACTURE: ICD-10-CM

## 2024-02-14 DIAGNOSIS — M25.561 RIGHT KNEE PAIN: Primary | ICD-10-CM
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
## 2024-02-14 LAB
DME PARACHUTE DELIVERY DATE REQUESTED: NORMAL
DME PARACHUTE ITEM DESCRIPTION: NORMAL
DME PARACHUTE ORDER STATUS: NORMAL
DME PARACHUTE SUPPLIER NAME: NORMAL
DME PARACHUTE SUPPLIER PHONE: NORMAL

## 2024-02-14 PROCEDURE — 72170 X-RAY EXAM OF PELVIS: CPT

## 2024-02-14 PROCEDURE — 99284 EMERGENCY DEPT VISIT MOD MDM: CPT | Performed by: EMERGENCY MEDICINE

## 2024-02-14 PROCEDURE — 73552 X-RAY EXAM OF FEMUR 2/>: CPT

## 2024-02-14 PROCEDURE — 99283 EMERGENCY DEPT VISIT LOW MDM: CPT

## 2024-02-14 RX ORDER — ACETAMINOPHEN 325 MG/1
975 TABLET ORAL ONCE
Status: COMPLETED | OUTPATIENT
Start: 2024-02-14 | End: 2024-02-14

## 2024-02-14 RX ORDER — NAPROXEN 500 MG/1
500 TABLET ORAL ONCE
Status: COMPLETED | OUTPATIENT
Start: 2024-02-14 | End: 2024-02-14

## 2024-02-14 RX ORDER — NAPROXEN 500 MG/1
500 TABLET ORAL 2 TIMES DAILY WITH MEALS
Qty: 30 TABLET | Refills: 0 | Status: SHIPPED | OUTPATIENT
Start: 2024-02-14

## 2024-02-14 RX ORDER — SENNOSIDES 8.6 MG
650 CAPSULE ORAL EVERY 8 HOURS PRN
Qty: 30 TABLET | Refills: 0 | Status: SHIPPED | OUTPATIENT
Start: 2024-02-14

## 2024-02-14 RX ORDER — OXYCODONE HYDROCHLORIDE 5 MG/1
5 TABLET ORAL ONCE
Status: COMPLETED | OUTPATIENT
Start: 2024-02-14 | End: 2024-02-14

## 2024-02-14 RX ADMIN — ACETAMINOPHEN 975 MG: 325 TABLET, FILM COATED ORAL at 10:35

## 2024-02-14 RX ADMIN — NAPROXEN 500 MG: 500 TABLET ORAL at 10:35

## 2024-02-14 RX ADMIN — OXYCODONE HYDROCHLORIDE 5 MG: 5 TABLET ORAL at 10:35

## 2024-02-14 NOTE — Clinical Note
Pete Wolf was seen and treated in our emergency department on 2/14/2024.                Diagnosis:     Pete  .    He may return on this date:     No work requiring use of right leg until cleared by orthopedics     If you have any questions or concerns, please don't hesitate to call.      Efrain Luna MD    ______________________________           _______________          _______________  Hospital Representative                              Date                                Time

## 2024-02-14 NOTE — DISCHARGE INSTRUCTIONS
Please use the knee immobilizer until you can follow up with the orthopedics team.     Please use the voltaren gel, naproxen and tylenol for pain control.    Please keep the leg elevated at night to help with swelling.     Please read the attached for further guidance in home care and reasons to return to the ED.

## 2024-02-14 NOTE — ED PROVIDER NOTES
History  Chief Complaint   Patient presents with    Hip Pain     Fall at work, c/o R  hip and knee pain      44M w/ h/o R hip fracture s/p IM nail, presents to the ED due to left hip and right knee pain after slipping and falling today. He was walking to his car when he slipped on ice and fell onto left buttocks but also is having significant pain in right knee which he feels he may have twisted during the fall. He notes this was a mechanical fall in nature, denies prodromal symptoms.  He is able to put weight on the knee, but has significant pain when attempting to bend it.  He denies similar pain in the left hip, and notes that it feels like a bruise and denies any difficulty with range of motion or bearing weight on it.  Denies any other injuries such as head strike, LOC, chest pain, shortness of breath, abdominal pain, numbness, weakness, or other complaints.    Prior to Admission Medications   Prescriptions Last Dose Informant Patient Reported? Taking?   aspirin (ECOTRIN) 325 mg EC tablet   No No   Sig: Take 1 tablet (325 mg total) by mouth 2 (two) times a day   docusate sodium (COLACE) 100 mg capsule   No No   Sig: Take 1 capsule (100 mg total) by mouth 2 (two) times a day   Patient not taking: Reported on 9/23/2021   methocarbamol (ROBAXIN) 500 mg tablet   No No   Sig: Take 1 tablet (500 mg total) by mouth 4 (four) times a day for 10 doses   polyethylene glycol (MIRALAX) 17 g packet   No No   Sig: Take 17 g by mouth daily   Patient not taking: Reported on 9/23/2021      Facility-Administered Medications: None       History reviewed. No pertinent past medical history.    Past Surgical History:   Procedure Laterality Date    APPENDECTOMY      CYST REMOVAL      ESOPHAGOGASTRODUODENOSCOPY N/A 1/22/2016    Procedure: ESOPHAGOGASTRODUODENOSCOPY (EGD);  Surgeon: Ruth Salgado MD;  Location: [facility];  Service:     INCISION AND DRAINAGE OF WOUND Left 4/25/2021    Procedure: INCISION AND DRAINAGE (I&D) LEFT KNEE;   Surgeon: Gil Ferreira DO;  [facility];  Service: Orthopedics    ND OPTX FEM SHFT FX W/INSJ IMED IMPLT W/WO SCREW Right 4/25/2021    Procedure: INSERTION NAIL IM FEMUR RETROGRADE;  Surgeon: Gil Ferreira DO;  [facility];  Service: Orthopedics       History reviewed. No pertinent family history.  I have reviewed and agree with the history as documented.    E-Cigarette/Vaping     E-Cigarette/Vaping Substances     Social History     Tobacco Use    Smoking status: Some Days    Smokeless tobacco: Never   Substance Use Topics    Alcohol use: Yes     Comment: occasional     Drug use: Not Currently        Review of Systems   All other systems reviewed and are negative.      Physical Exam  ED Triage Vitals   Temperature Pulse Respirations Blood Pressure SpO2   02/14/24 0913 02/14/24 0915 02/14/24 0915 02/14/24 0915 02/14/24 0915   98.3 °F (36.8 °C) 79 20 (!) 160/102 95 %      Temp Source Heart Rate Source Patient Position - Orthostatic VS BP Location FiO2 (%)   02/14/24 0913 02/14/24 0915 02/14/24 0915 02/14/24 0915 --   Oral Monitor Lying Right arm       Pain Score       02/14/24 0915       9             Orthostatic Vital Signs  Vitals:    02/14/24 0915 02/14/24 1100 02/14/24 1200   BP: (!) 160/102     Pulse: 79 70 74   Patient Position - Orthostatic VS: Lying Lying        Physical Exam  Vitals and nursing note reviewed.   Constitutional:       General: He is not in acute distress.     Appearance: He is well-developed.   HENT:      Head: Normocephalic and atraumatic.   Eyes:      Conjunctiva/sclera: Conjunctivae normal.   Cardiovascular:      Rate and Rhythm: Normal rate and regular rhythm.      Heart sounds: No murmur heard.  Pulmonary:      Effort: Pulmonary effort is normal. No respiratory distress.      Breath sounds: Normal breath sounds.   Abdominal:      Palpations: Abdomen is soft.      Tenderness: There is no abdominal tenderness.   Musculoskeletal:      Cervical back: Neck supple.       Comments: Exquisitely tender to palpation of right knee both medially and laterally.  Pain with passive range of motion of right knee.  Mild tenderness to palpation of left greater trochanter.  Distal pulses intact.  No varus or valgus laxity of right knee.  Negative anterior and posterior drawer.    Skin:     General: Skin is warm and dry.      Capillary Refill: Capillary refill takes less than 2 seconds.   Neurological:      Mental Status: He is alert.   Psychiatric:         Mood and Affect: Mood normal.         ED Medications  Medications   oxyCODONE (ROXICODONE) IR tablet 5 mg (5 mg Oral Given 2/14/24 1035)   naproxen (NAPROSYN) tablet 500 mg (500 mg Oral Given 2/14/24 1035)   acetaminophen (TYLENOL) tablet 975 mg (975 mg Oral Given 2/14/24 1035)       Diagnostic Studies  Results Reviewed       None                   XR femur 2 views RIGHT   ED Interpretation by Efrain Luna MD (02/14 1229)   No acute fractures      Final Result by Robb Duncan MD (02/14 1334)      No acute osseous abnormality.      Workstation performed: [id number]         XR pelvis ap only 1 or 2 vw   ED Interpretation by Efrain Luna MD (02/14 1229)   No acute fractures      Final Result by Robb Duncan MD (02/14 1334)      No acute osseous abnormality.      Workstation performed: [id number]               Procedures  Procedures      ED Course                             SBIRT 20yo+      Flowsheet Row Most Recent Value   Initial Alcohol Screen: US AUDIT-C     1. How often do you have a drink containing alcohol? 0 Filed at: 02/14/2024 0917   2. How many drinks containing alcohol do you have on a typical day you are drinking?  0 Filed at: 02/14/2024 0917   3a. Male UNDER 65: How often do you have five or more drinks on one occasion? 0 Filed at: 02/14/2024 0917   Audit-C Score 0 Filed at: 02/14/2024 0917   [place]ET[place]: How many times in the past year have you...    Used an illegal drug or used a prescription medication for non-medical  reasons? Never Filed at: 02/14/2024 0917                  Medical Decision Making  44-year-old male present emergency department due to fall with hip pain and right knee pain.  Concerning for osseous injury.  Reviewed old x-rays showing significant hardware in her right femur.  Will obtain x-rays to evaluate for further osseous injury.  It is concerning that patient may have injured soft tissue structures in right knee.  However, unable to further evaluate ligaments or meniscal injuries in the emergency department at this time.  Patient offered crutches versus knee immobilizer until orthopedic follow-up.  Patient opting for knee immobilizer.  Patient able to ambulate with some discomfort in the emergency department.  Recommend continued anti-inflammatory medications, orthopedics follow-up as soon as possible, return precautions provided.    Amount and/or Complexity of Data Reviewed  Radiology: ordered and independent interpretation performed.    Risk  OTC drugs.  Prescription drug management.          Disposition  Final diagnoses:   Right knee pain   Fall, initial encounter   History of femur fracture     Time reflects when diagnosis was documented in both MDM as applicable and the Disposition within this note       Time User Action Codes Description Comment    2/14/2024 12:29 PM Yokubaitis, Efrain Add [M25.561] Right knee pain     2/14/2024 12:30 PM Yokubaitis, Efrain Add [W19.XXXA] Fall, initial encounter     2/14/2024 12:31 PM Yokubaitis, Efrain Add [Z87.81] History of femur fracture           ED Disposition       ED Disposition   Discharge    Condition   Stable    Date/Time   Wed Feb 14, 2024 1226    Comment   Pete Wolf discharge to home/self care.                   Follow-up Information       Follow up With Specialties Details Why Contact Info Additional Information    [facility]   [address] [facility] [address]  [phone]   [facility] [place], [facility] [place] [address], [place], [place], [address] [phone]  Use Entrance A             Discharge Medication List as of 2/14/2024 12:32 PM        START taking these medications    Details   acetaminophen (TYLENOL) 650 mg CR tablet Take 1 tablet (650 mg total) by mouth every 8 (eight) hours as needed for mild pain, Starting Wed 2/14/2024, Normal      Diclofenac Sodium (VOLTAREN) 1 % Apply 2 g topically 4 (four) times a day, Starting Wed 2/14/2024, Normal      naproxen (Naprosyn) 500 mg tablet Take 1 tablet (500 mg total) by mouth 2 (two) times a day with meals, Starting Wed 2/14/2024, Normal           CONTINUE these medications which have NOT CHANGED    Details   aspirin (ECOTRIN) 325 mg EC tablet Take 1 tablet (325 mg total) by mouth 2 (two) times a day, Starting Fri 4/30/2021, Until Fri 6/4/2021, No Print      docusate sodium (COLACE) 100 mg capsule Take 1 capsule (100 mg total) by mouth 2 (two) times a day, Starting Fri 4/30/2021, No Print      methocarbamol (ROBAXIN) 500 mg tablet Take 1 tablet (500 mg total) by mouth 4 (four) times a day for 10 doses, Starting Tue 5/11/2021, Until Fri 5/14/2021, No Print      polyethylene glycol (MIRALAX) 17 g packet Take 17 g by mouth daily, Starting Sun 5/2/2021, No Print               PDMP Review         Value Time User    PDMP Reviewed  Yes 5/1/2021 12:06 PM [place]             ED Provider  Attending physically available and evaluated Pete Wolf. I managed the patient along with the ED Attending.    Electronically Signed by           Efrain Luna MD  02/18/24 [id number]

## 2024-02-14 NOTE — ED ATTENDING ATTESTATION
2/14/2024  ITony DO, saw and evaluated the patient. I have discussed the patient with the resident/non-physician practitioner and agree with the resident's/non-physician practitioner's findings, Plan of Care, and MDM as documented in the resident's/non-physician practitioner's note, except where noted. All available labs and Radiology studies were reviewed.  I was present for key portions of any procedure(s) performed by the resident/non-physician practitioner and I was immediately available to provide assistance.       At this point I agree with the current assessment done in the Emergency Department.  I have conducted an independent evaluation of this patient a history and physical is as follows:    44-year-old male presents via EMS for complaint of right knee pain and left posterior hip pain after slipping and falling on the ice outside of work.  He has a prior right femur fracture with a christie.  He states he slipped and landed on his left hip/buttock but his right knee flung out in front of him.  Despite not actually striking his right knee, he has more pain in the knee than he does in the hip/buttock/back.  He complains of swelling and tenderness along the joint line.  He was unable to ambulate after the fall.  He denies hitting his head, having loss of consciousness and does not take any anticoagulant or antiplatelet medications.    ROS: Denies visual change, LH/dizziness, HA, midline neck or back pain, CP, SOB, abdominal pain, n/v. 12 system ROS o/w negative.    PE: Mild distress, appears uncomfortable, alert, GCS 15; PERRL, EOMI; no hemotympanum; no septal hematoma or epistaxis; MMM, no post OP exudate, edema or erythema, no dental trauma; no midline vert TTP, no crepitus or step-offs; HRR, no murmur; lungs CTA w/o w/r/r, POx 100% on RA (nl); abd s/nt/nd, nl BS in all four quadrants; (-) LE edema, no calf TTP, stable pelvis, FROM extremities x3 with difficulty flexing or extending his right knee due  to pain, mild swelling noted at the right inferior knee without crepitus or deformity, mild TTP over the left posterior hip and paraspinal musculature, strength and sensation appear intact; skin p/w/d; CN II-XII GI/NF, oriented.    MDM/DDx: Fall with left hip and right knee pain - musculoskeletal sprain/strain, contusion, possible fracture or disruption of surgical hardware.     I independently reviewed and interpreted ordered imaging from this encounter.    A/P: Will check x-rays, treat symptoms, reevaluate for further work up and disposition.    ED Course         Critical Care Time  Procedures

## 2024-02-19 ENCOUNTER — OFFICE VISIT (OUTPATIENT)
Dept: OBGYN CLINIC | Facility: HOSPITAL | Age: 44
End: 2024-02-19

## 2024-02-19 ENCOUNTER — TELEPHONE (OUTPATIENT)
Dept: OBGYN CLINIC | Facility: HOSPITAL | Age: 44
End: 2024-02-19

## 2024-02-19 VITALS
HEART RATE: 65 BPM | HEIGHT: 64 IN | DIASTOLIC BLOOD PRESSURE: 86 MMHG | WEIGHT: 169 LBS | SYSTOLIC BLOOD PRESSURE: 125 MMHG | BODY MASS INDEX: 28.85 KG/M2

## 2024-02-19 DIAGNOSIS — M23.91 INTERNAL DERANGEMENT OF RIGHT KNEE: Primary | ICD-10-CM

## 2024-02-19 DIAGNOSIS — Z87.81 HISTORY OF FEMUR FRACTURE: ICD-10-CM
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
## 2024-02-19 DIAGNOSIS — M25.561 RIGHT KNEE PAIN: ICD-10-CM

## 2024-02-19 PROCEDURE — 99213 OFFICE O/P EST LOW 20 MIN: CPT | Performed by: STUDENT IN AN ORGANIZED HEALTH CARE EDUCATION/TRAINING PROGRAM

## 2024-02-19 NOTE — TELEPHONE ENCOUNTER
Patient's MRI was not scheduled yet -- pt fell at a car dealership, and does not have insurance. They are waiting to see if the car dealership's insurance company will be paying. They do not have a claim number.     Patient is going to call central scheduling once they know the claim information. The script was provided. I also gave them the flyer for , if worst case the car dealership doesn't pay for it and they still want the test.
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
## 2024-02-19 NOTE — ASSESSMENT & PLAN NOTE
Patient has a history, physical examination and radiographic evaluation consistent with internal derangement of the right knee with a differential that includes, but is not limited to, ruptured ligament, meniscal pathology, stress fracture, infection.    -MRI of the right knee ordered to evaluate for the above pathology  -Weightbearing as tolerated right lower extremity  -Over-the-counter NSAIDs and Tylenol for pain management  -Rest, ice and elevation to control swelling  -Activity modification to limit impact on the right knee joint  -Restore normal ROM  -I will see the patient back after obtaining his MRI to review the results and develop a plan of care  
Airway patent, Nasal mucosa clear. Mouth with normal mucosa. Throat has no vesicles, no oropharyngeal exudates and uvula is midline.

## 2024-02-27 ENCOUNTER — HOSPITAL ENCOUNTER (OUTPATIENT)
Dept: RADIOLOGY | Facility: HOSPITAL | Age: 44
Discharge: HOME | End: 2024-02-27
Attending: STUDENT IN AN ORGANIZED HEALTH CARE EDUCATION/TRAINING PROGRAM

## 2024-02-27 DIAGNOSIS — M23.91 INTERNAL DERANGEMENT OF RIGHT KNEE: ICD-10-CM

## 2024-02-27 PROCEDURE — G1004 CDSM NDSC: HCPCS

## 2024-02-27 PROCEDURE — 73721 MRI JNT OF LWR EXTRE W/O DYE: CPT

## 2024-02-29 LAB
DME PARACHUTE DELIVERY DATE ACTUAL: NORMAL
DME PARACHUTE DELIVERY DATE REQUESTED: NORMAL
DME PARACHUTE ITEM DESCRIPTION: NORMAL
DME PARACHUTE ORDER STATUS: NORMAL
DME PARACHUTE SUPPLIER NAME: NORMAL
DME PARACHUTE SUPPLIER PHONE: NORMAL

## 2024-03-18 ENCOUNTER — OFFICE VISIT (OUTPATIENT)
Dept: OBGYN CLINIC | Facility: HOSPITAL | Age: 44
End: 2024-03-18

## 2024-03-18 VITALS
SYSTOLIC BLOOD PRESSURE: 144 MMHG | HEIGHT: 64 IN | DIASTOLIC BLOOD PRESSURE: 97 MMHG | WEIGHT: 174 LBS | HEART RATE: 81 BPM | BODY MASS INDEX: 29.71 KG/M2

## 2024-03-18 DIAGNOSIS — M23.91 INTERNAL DERANGEMENT OF RIGHT KNEE: ICD-10-CM
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
## 2024-03-18 DIAGNOSIS — M25.561 ACUTE PAIN OF RIGHT KNEE: Primary | ICD-10-CM

## 2024-03-18 DIAGNOSIS — Z87.81 HISTORY OF FEMUR FRACTURE: ICD-10-CM

## 2024-03-18 DIAGNOSIS — M17.11 PRIMARY OSTEOARTHRITIS OF RIGHT KNEE: ICD-10-CM

## 2024-03-18 PROCEDURE — 20610 DRAIN/INJ JOINT/BURSA W/O US: CPT | Performed by: STUDENT IN AN ORGANIZED HEALTH CARE EDUCATION/TRAINING PROGRAM

## 2024-03-18 PROCEDURE — 99214 OFFICE O/P EST MOD 30 MIN: CPT | Performed by: STUDENT IN AN ORGANIZED HEALTH CARE EDUCATION/TRAINING PROGRAM

## 2024-03-18 RX ORDER — LIDOCAINE HYDROCHLORIDE 10 MG/ML
2 INJECTION, SOLUTION INFILTRATION; PERINEURAL
Status: COMPLETED | OUTPATIENT
Start: 2024-03-18 | End: 2024-03-18

## 2024-03-18 RX ORDER — BETAMETHASONE SODIUM PHOSPHATE AND BETAMETHASONE ACETATE 3; 3 MG/ML; MG/ML
12 INJECTION, SUSPENSION INTRA-ARTICULAR; INTRALESIONAL; INTRAMUSCULAR; SOFT TISSUE
Status: COMPLETED | OUTPATIENT
Start: 2024-03-18 | End: 2024-03-18

## 2024-03-18 RX ADMIN — LIDOCAINE HYDROCHLORIDE 2 ML: 10 INJECTION, SOLUTION INFILTRATION; PERINEURAL at 08:45

## 2024-03-18 RX ADMIN — BETAMETHASONE SODIUM PHOSPHATE AND BETAMETHASONE ACETATE 12 MG: 3; 3 INJECTION, SUSPENSION INTRA-ARTICULAR; INTRALESIONAL; INTRAMUSCULAR; SOFT TISSUE at 08:45

## 2024-03-18 NOTE — ASSESSMENT & PLAN NOTE
Patient has a history, physical examination and radiographic evaluation consistent with lateral tibial condyle stress reaction with possible underlying stress fracture.    -With appropriate rest and rehabilitation, this entity should resolve  -WBAT  -Activity modification to limit strain or impact on the joint  -ibuprofen (Motrin) as needed  -Tylenol 1000mg up to three times daily as needed. Do not exceed 3000mg daily  -Weight loss discussed   -Knee sleeve or brace for comfort  -Corticosteroid injection was offered, accepted, and administered.  Risk benefits and alternatives were discussed prior to injection.  Patient tolerated the procedure well.  - Surgical hardware removal and scar tissue excision discussed as potential treatment option if patient does not progress.    -Patient may follow up in 6 week(s) for further evaluation and treatment   -We did discuss patient is extensor lag and mechanical block to flexion at 90 degrees.  If the patient would like to discuss surgery in the future for lysis of adhesions and possible removal of hardware, I would be happy to explain that line of treatment.    Work status:  No note provided as patient works for himself

## 2024-03-18 NOTE — PROGRESS NOTES
Date: 24  Pete Wolf   MRN# [id number]  : 1980      Chief Complaint: Right Knee Pain    Assessment and Plan:  The patient verbalized understanding of exam findings and treatment plan. We engaged in the shared decision-making process and treatment options were discussed at length with the patient. Surgical and conservative management discussed today along with risks and benefits. Patient was agreeable with the plan and all questions were answered to satisfaction.     Internal derangement of right knee  Patient has a history, physical examination and radiographic evaluation consistent with lateral tibial condyle stress reaction with possible underlying stress fracture.    -With appropriate rest and rehabilitation, this entity should resolve  -WBAT  -Activity modification to limit strain or impact on the joint  -ibuprofen (Motrin) as needed  -Tylenol 1000mg up to three times daily as needed. Do not exceed 3000mg daily  -Weight loss discussed   -Knee sleeve or brace for comfort  -Corticosteroid injection was offered, accepted, and administered.  Risk benefits and alternatives were discussed prior to injection.  Patient tolerated the procedure well.  - Surgical hardware removal and scar tissue excision discussed as potential treatment option if patient does not progress.    -Patient may follow up in 6 week(s) for further evaluation and treatment   -We did discuss patient is extensor lag and mechanical block to flexion at 90 degrees.  If the patient would like to discuss surgery in the future for lysis of adhesions and possible removal of hardware, I would be happy to explain that line of treatment.    Work status:  No note provided as patient works for himself           Subjective:   Pete Wolf is a 44 y.o. male who is being seen in follow-up for Right knee pain with MRI review.  When we last saw he we recommended right knee MRI.  Pain has improved. Today he complains of  right anterior central knee pain that can extend into anterior shin with some numbness/tingling.  He denies lumbar pain.  His pain is greatest at night lying in bed yet can reduce with certain positions.  No other orthopedic complaints or concerns.  He has history of right femur IM nail about 3 years ago.    DOI: 2/14/2024 with slip/fall at work.  Patient works for himself      Prior treatment:  NSAIDs Yes    Bracing No  Physical Therapy No   Cortisone Injections No   Viscosupplementation No     Allergy:  No Known Allergies  Medications:  All current active meds have been reviewed   Past Medical History:  History reviewed. No pertinent past medical history.  Past Surgical History:  Past Surgical History:   Procedure Laterality Date    APPENDECTOMY      CYST REMOVAL      ESOPHAGOGASTRODUODENOSCOPY N/A 1/22/2016    Procedure: ESOPHAGOGASTRODUODENOSCOPY (EGD);  Surgeon: Ruth Salgado MD;  Location: [facility];  Service:     INCISION AND DRAINAGE OF WOUND Left 4/25/2021    Procedure: INCISION AND DRAINAGE (I&D) LEFT KNEE;  Surgeon: Gil Ferreira DO;  [facility];  Service: Orthopedics    VA OPTX FEM SHFT FX W/INSJ IMED IMPLT W/WO SCREW Right 4/25/2021    Procedure: INSERTION NAIL IM FEMUR RETROGRADE;  Surgeon: Gil Ferreira DO;  [facility];  Service: Orthopedics     Family History:  History reviewed. No pertinent family history.  Social History:  Social History     Substance and Sexual Activity   Alcohol Use Yes    Comment: occasional      Social History     Substance and Sexual Activity   Drug Use Not Currently     Social History     Tobacco Use   Smoking Status Some Days   Smokeless Tobacco Never           Review of Systems:  General- denies fever/chills  HEENT- denies hearing loss or sore throat  Eyes- denies eye pain or visual disturbances, denies red eyes  Respiratory- denies cough or SOB  Cardio- denies chest pain or palpitations  GI- denies abdominal pain  Endocrine- denies urinary  "frequency  Urinary- denies pain with urination  Musculoskeletal- Negative except noted above  Skin- denies rashes or wounds  Neurological- denies dizziness or headache  Psychiatric- denies anxiety or difficulty concentrating    Objective:   BP Readings from Last 1 Encounters:   03/18/24 144/97      Wt Readings from Last 1 Encounters:   03/18/24 78.9 kg (174 lb)      Pulse Readings from Last 1 Encounters:   03/18/24 81        BMI: Estimated body mass index is 29.87 kg/m² as calculated from the following:    Height as of this encounter: 5' 4\" (1.626 m).    Weight as of this encounter: 78.9 kg (174 lb).    Physical Exam  /97   Pulse 81   Ht 5' 4\" (1.626 m)   Wt 78.9 kg (174 lb)   BMI 29.87 kg/m²   General/Constitutional: No apparent distress: well-nourished and well developed.  Eyes: normal ocular motion  Cardio: RRR, Normal S1S2, No m/r/g.   Lymphatic: No appreciable lymphadenopathy  Respiratory: Non-labored breathing, CTA b/l no w/c/r  Vascular: No edema, swelling or tenderness, except as noted in detailed exam. Extremities well perfused. No LE edema  Integumentary: No impressive skin lesions present, except as noted in detailed exam.  Neuro: No ataxia or tremors noted  Psych: Normal mood and affect, oriented to person, place and time. Appropriate affect.  Musculoskeletal: Normal, except as noted in detailed exam and in HPI.    Gait and Station:   antalgic    Right Knee Exam:      Inspection:  Well healed anterior incision    Overall limb alignment: neutral    Effusion: Mild    ROM 5 to 90 with pain    Extensor Lag: Absent    Palpation: Medial and Lateral joint line tenderness to palpation    stable to AP translation at 90 deg    Coronal plane stable in full extension    Coronal plane stable in mid-flexion     Motor: 5/5 EHL/FHL/TA/GS/Qd/Hs    Vascular: Toes WWP with BCR    Sensory: SILT DP/SP/Greg/Saph/Ti      Images:  I personally reviewed relevant images in the PACS system and my interpretation is as " follows:      MRI available for review of Right knee was available for review which demonstrates subchondral lateral tibial plateau stress reaction.  Moderate-sized knee lipohemarthrosis.   Intact menisci and ligaments    Large joint arthrocentesis: R knee  Universal Protocol:  Consent: Verbal consent obtained.  Consent given by: patient  Timeout called at: 3/18/2024 9:08 AM.  Patient understanding: patient states understanding of the procedure being performed  Site marked: the operative site was marked  Patient identity confirmed: verbally with patient  Supporting Documentation  Indications: pain   Procedure Details  Location: knee - R knee  Needle size: 22 G  Ultrasound guidance: no  Approach: anterolateral  Medications administered: 2 mL lidocaine 1 %; 12 mg betamethasone acetate-betamethasone sodium phosphate 6 (3-3) mg/mL    Patient tolerance: patient tolerated the procedure well with no immediate complications  Dressing:  Sterile dressing applied            Scribe Attestation      I,:  Kareem Gutierrez am acting as a scribe while in the presence of the attending physician.:       I,:  Demetrio Johnson MD personally performed the services described in this documentation    as scribed in my presence.:               Demetrio Johnson MD  Adult Reconstruction Specialist   [facility]

## (undated) DEVICE — SUT VICRYL PLUS 2-0 CTB-1 27 IN VCPB259H

## (undated) DEVICE — 4.2MM THREE-FLUTED DRILL BIT QC/330MM/100MM CALIBRATION

## (undated) DEVICE — PLUMEPEN PRO 10FT

## (undated) DEVICE — 3.2MM GUIDE WIRE 290MM

## (undated) DEVICE — PADDING CAST 4 IN  COTTON STRL

## (undated) DEVICE — 2.5MM REAMING ROD WITH BALL TIP/950MM-STERILE

## (undated) DEVICE — ABDOMINAL PAD: Brand: DERMACEA

## (undated) DEVICE — PADDING CAST 3IN COTTON STRL

## (undated) DEVICE — DRESSING MEPILEX AG BORDER 3 X 3 IN

## (undated) DEVICE — INTENDED FOR TISSUE SEPARATION, AND OTHER PROCEDURES THAT REQUIRE A SHARP SURGICAL BLADE TO PUNCTURE OR CUT.: Brand: BARD-PARKER SAFETY BLADES SIZE 10, STERILE

## (undated) DEVICE — MEDI-VAC YANK SUCT HNDL W/TPRD BULBOUS TIP: Brand: CARDINAL HEALTH

## (undated) DEVICE — 4.2MM THREE-FLUTED DRILL BIT QC/NEEDLE POINT/145MM

## (undated) DEVICE — DRESSING MEPILEX AG BORDER 4 X 4 IN

## (undated) DEVICE — IMPLANTABLE DEVICE
Type: IMPLANTABLE DEVICE | Site: ELBOW | Status: NON-FUNCTIONAL
Brand: STEINMANN PIN, TRC/PL SHNK
Removed: 2021-04-25

## (undated) DEVICE — STOCKINETTE REGULAR

## (undated) DEVICE — PROXIMATE PLUS MD MULTI-DIRECTIONAL RELEASE SKIN STAPLERS CONTAINS 35 STAINLESS STEEL STAPLES APPROXIMATE CLOSED DIMENSIONS: 6.9MM X 3.9MM WIDE: Brand: PROXIMATE

## (undated) DEVICE — ACE WRAP 6 IN UNSTERILE

## (undated) DEVICE — OCCLUSIVE GAUZE STRIP,3% BISMUTH TRIBROMOPHENATE IN PETROLATUM BLEND: Brand: XEROFORM

## (undated) DEVICE — INTENDED FOR TISSUE SEPARATION, AND OTHER PROCEDURES THAT REQUIRE A SHARP SURGICAL BLADE TO PUNCTURE OR CUT.: Brand: BARD-PARKER SAFETY BLADES SIZE 15, STERILE

## (undated) DEVICE — SPONGE PVP SCRUB WING STERILE

## (undated) DEVICE — CUFF TOURNIQUET 18 X 5.5 IN QUICK CONNECT 2BLA

## (undated) DEVICE — SUT VICRYL PLUS 1 CTB-1 36 IN VCPB947H

## (undated) DEVICE — GLOVE SRG BIOGEL 7.5

## (undated) DEVICE — GLOVE INDICATOR PI UNDERGLOVE SZ 8 BLUE

## (undated) DEVICE — PACK MAJOR ORTHO W/SPLITS PBDS

## (undated) DEVICE — GAUZE SPONGES,16 PLY: Brand: CURITY

## (undated) DEVICE — DRAPE C-ARM X-RAY

## (undated) DEVICE — DRAPE C-ARMOUR

## (undated) DEVICE — CHLORAPREP HI-LITE 26ML ORANGE